# Patient Record
Sex: MALE | Race: WHITE | NOT HISPANIC OR LATINO | Employment: FULL TIME | URBAN - METROPOLITAN AREA
[De-identification: names, ages, dates, MRNs, and addresses within clinical notes are randomized per-mention and may not be internally consistent; named-entity substitution may affect disease eponyms.]

---

## 2019-01-25 ENCOUNTER — OFFICE VISIT (OUTPATIENT)
Dept: FAMILY MEDICINE CLINIC | Facility: CLINIC | Age: 39
End: 2019-01-25
Payer: COMMERCIAL

## 2019-01-25 VITALS
BODY MASS INDEX: 18.32 KG/M2 | WEIGHT: 114 LBS | SYSTOLIC BLOOD PRESSURE: 106 MMHG | DIASTOLIC BLOOD PRESSURE: 76 MMHG | HEIGHT: 66 IN | HEART RATE: 88 BPM | RESPIRATION RATE: 17 BRPM | TEMPERATURE: 97.7 F

## 2019-01-25 DIAGNOSIS — Z87.891 FORMER SMOKER: ICD-10-CM

## 2019-01-25 DIAGNOSIS — R73.01 IFG (IMPAIRED FASTING GLUCOSE): ICD-10-CM

## 2019-01-25 DIAGNOSIS — Z76.89 ENCOUNTER TO ESTABLISH CARE WITH NEW DOCTOR: Primary | ICD-10-CM

## 2019-01-25 DIAGNOSIS — Z00.00 ROUTINE ADULT HEALTH MAINTENANCE: ICD-10-CM

## 2019-01-25 PROCEDURE — 99385 PREV VISIT NEW AGE 18-39: CPT | Performed by: FAMILY MEDICINE

## 2019-01-25 NOTE — PATIENT INSTRUCTIONS

## 2019-01-25 NOTE — ASSESSMENT & PLAN NOTE
Per pt, had an abnormal FBG on labs in Jan 2018, reviewed healthy diet and will be getting repeat labs in Fall 2019, send results here

## 2019-01-25 NOTE — PROGRESS NOTES
HEALTH MAINTENANCE-NEW PATIENT  Kalyan Jeffery 41 y o  male   DATE: January 25, 2019   Assessment and Plan:  41 y o  male exam      1  Health Maintenance  - Colonoscopy? (Age 54-65)- start at age 48, not at increased risk  - Labs: gets them done at work  - Immunizations: Reviewed  Recommend yearly flu vaccine- will get it at work  Will check with insurance about [place] [facility] Maintenance   Topic Date Due    INFLUENZA VACCINE  02/25/2019 (Originally 7/1/2018)    DTaP,Tdap,and Td Vaccines (1 - Tdap) 02/25/2019 (Originally 6/18/2002)    Depression Screening PHQ  01/25/2020     2  Discussed the patient's BMI with him, (Body mass index is 18 26 kg/m²  )  Advised a healthy, balanced diet and regular exercise for 30 minutes 4-5 times a week  3  Patient Counseling: Patient given handout  --Nutrition: Stressed importance of moderation in sodium/caffeine intake, saturated fat and cholesterol, caloric balance, sufficient intake of fresh fruits, vegetables, fiber, calcium, AND iron  --Exercise: Stressed the importance of regular exercise  --Substance Abuse: Discussed cessation/primary prevention of tobacco, alcohol, or other drug use; driving or other dangerous activities under the influence; availability of treatment for abuse  --Dental health: Discussed importance of regular tooth brushing, flossing, and dental visits  4  Other diagnoses addressed today:   IFG (impaired fasting glucose)  Per pt, had an abnormal FBG on labs in Jan 2018, reviewed healthy diet and will be getting repeat labs in Fall 2019, send results here    Former smoker  Quit smoking in 2018! 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 [employer]  Labs done in Jan 2018 at work: FLP-WNL per pt  BG slightly elevated    Vaccines: TdaP maybe in 2002, no influenza    Diet: [address]    Histories Updated and Reviewed 1/25/2019:  Patient's Medications    No medications on file     Allergies   Allergen Reactions    Other Allergic Rhinitis     SEASONAL ALLERGIES AND PET DANDER     Past Medical History:   Diagnosis Date    Allergic      Social History     Social History    Marital status: /Civil Union     Spouse name: N/A    Number of children: N/A    Years of education: N/A     Occupational History    Not on file  Social History Main Topics    Smoking status: Former Smoker     Packs/day: 0 50     Years: 22 00     Quit date: 11/15/2018    Smokeless tobacco: Never Used    Alcohol use Yes      Comment: social drinker    Drug use: No    Sexual activity: Yes     Partners: Female     Birth control/ protection: Condom Male     Other Topics Concern    Not on file     Social History Narrative    No narrative on file       There is no immunization history on file for this patient  Review of Systems:  Review of Systems   Constitutional: Negative for chills and fever  HENT: Negative for hearing loss  Eyes: Negative for visual disturbance  Respiratory: Negative for shortness of breath  Cardiovascular: Negative for chest pain  Gastrointestinal: Negative for nausea and vomiting  Genitourinary: Negative for difficulty urinating  Skin: Negative for rash  Allergic/Immunologic: Negative for environmental allergies  Hematological: Does not bruise/bleed easily       PHQ-9 Depression Screening    PHQ-9:    Frequency of the following problems over the past two weeks:       Little interest or pleasure in doing things:  1 - several days  Feeling down, depressed, or hopeless:  1 - several days  Trouble falling or staying asleep, or sleeping too much:  0 - not at all  Feeling tired or having little energy:  0 - not at all  Poor appetite or overeatin - not at all  Feeling bad about yourself - or that you are a failure or have let yourself or your family down:  1 - several days  Trouble concentrating on things, such as reading the newspaper or watching television:  0 - not at all  Moving or speaking so slowly that other people could have noticed  Or the opposite - being so fidgety or restless that you have been moving around a lot more than usual:  0 - not at all  Thoughts that you would be better off dead, or of hurting yourself in some way:  0 - not at all  PHQ-2 Score:  2  PHQ-9 Score:  3       Objective:  /76 (BP Location: Left arm, Patient Position: Sitting, Cuff Size: Standard)   Pulse 88   Temp 97 7 °F (36 5 °C)   Resp 17   Ht 5' 6 25" (1 683 m)   Wt 51 7 kg (114 lb)   BMI 18 26 kg/m²   Physical Exam   Constitutional: He is oriented to person, place, and time  He appears well-developed and well-nourished  No distress  HENT:   Head: Normocephalic and atraumatic  Mouth/Throat: Oropharynx is clear and moist  No oropharyngeal exudate  Eyes: Pupils are equal, round, and reactive to light  Conjunctivae are normal  Right eye exhibits no discharge  Left eye exhibits no discharge  Neck: Normal range of motion  Neck supple  Cardiovascular: Normal rate, regular rhythm and normal heart sounds  Pulmonary/Chest: Effort normal and breath sounds normal  No respiratory distress  He has no wheezes  He has no rales  Abdominal: Soft  Bowel sounds are normal  He exhibits no distension  There is no tenderness  Lymphadenopathy:     He has no cervical adenopathy  Neurological: He is alert and oriented to person, place, and time  Skin: He is not diaphoretic  Multiple tattoos   Vitals reviewed  Patient Care Team:  Tiffanie Espinoza MD as PCP - General (Family Medicine)    Tiffanie Espinoza MD    Note: Portions of the record may have been created with voice recognition software    Occasional wrong word or "sound a like" substitutions may have occurred due to the inherent limitations of voice recognition software  Read the chart carefully and recognize, using context, where substitutions have occurred

## 2019-06-12 ENCOUNTER — OFFICE VISIT (OUTPATIENT)
Dept: URGENT CARE | Age: 39
End: 2019-06-12
Payer: COMMERCIAL

## 2019-06-12 VITALS
BODY MASS INDEX: 17.84 KG/M2 | TEMPERATURE: 98.2 F | WEIGHT: 111 LBS | RESPIRATION RATE: 16 BRPM | HEIGHT: 66 IN | HEART RATE: 88 BPM | OXYGEN SATURATION: 98 % | DIASTOLIC BLOOD PRESSURE: 72 MMHG | SYSTOLIC BLOOD PRESSURE: 108 MMHG
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
## 2019-06-12 PROCEDURE — 99213 OFFICE O/P EST LOW 20 MIN: CPT | Performed by: NURSE PRACTITIONER

## 2019-06-12 RX ORDER — SULFAMETHOXAZOLE AND TRIMETHOPRIM 800; 160 MG/1; MG/1
1 TABLET ORAL EVERY 12 HOURS SCHEDULED
Qty: 14 TABLET | Refills: 0 | Status: SHIPPED | OUTPATIENT
Start: 2019-06-12 | End: 2019-06-19

## 2019-10-25 ENCOUNTER — HOSPITAL ENCOUNTER (INPATIENT)
Facility: HOSPITAL | Age: 39
LOS: 2 days | Discharge: HOME | DRG: 639 | End: 2019-10-27
Attending: EMERGENCY MEDICINE | Admitting: INTERNAL MEDICINE
Payer: COMMERCIAL

## 2019-10-25 ENCOUNTER — OFFICE VISIT (OUTPATIENT)
Dept: FAMILY MEDICINE CLINIC | Facility: CLINIC | Age: 39
End: 2019-10-25
Payer: COMMERCIAL

## 2019-10-25 VITALS
SYSTOLIC BLOOD PRESSURE: 104 MMHG | BODY MASS INDEX: 17.92 KG/M2 | TEMPERATURE: 97.4 F | OXYGEN SATURATION: 98 % | RESPIRATION RATE: 16 BRPM | WEIGHT: 111 LBS | HEART RATE: 104 BPM | DIASTOLIC BLOOD PRESSURE: 70 MMHG

## 2019-10-25 DIAGNOSIS — E86.0 DEHYDRATION: ICD-10-CM

## 2019-10-25 DIAGNOSIS — E11.9 DIABETES MELLITUS (HCC): ICD-10-CM

## 2019-10-25 DIAGNOSIS — R73.9 HIGH BLOOD SUGAR: Primary | ICD-10-CM

## 2019-10-25 DIAGNOSIS — E13.65 UNCONTROLLED OTHER SPECIFIED DIABETES MELLITUS WITH HYPERGLYCEMIA (HCC): Primary | ICD-10-CM

## 2019-10-25 DIAGNOSIS — E11.9 DIABETES MELLITUS, NEW ONSET (HCC): ICD-10-CM

## 2019-10-25 LAB
ALBUMIN SERPL BCP-MCNC: 3.5 G/DL (ref 3.5–5)
ALP SERPL-CCNC: 73 U/L (ref 46–116)
ALT SERPL W P-5'-P-CCNC: 22 U/L (ref 12–78)
ANION GAP BLD CALC-SCNC: 16 MMOL/L (ref 4–13)
ANION GAP SERPL CALCULATED.3IONS-SCNC: 6 MMOL/L (ref 4–13)
AST SERPL W P-5'-P-CCNC: 11 U/L (ref 5–45)
BASE EXCESS BLDA CALC-SCNC: 2 MMOL/L (ref -2–3)
BASOPHILS # BLD AUTO: 0.02 THOUSANDS/ΜL (ref 0–0.1)
BASOPHILS NFR BLD AUTO: 0 % (ref 0–1)
BETA-HYDROXYBUTYRATE: 1.1 MMOL/L
BILIRUB SERPL-MCNC: 0.5 MG/DL (ref 0.2–1)
BUN BLD-MCNC: 9 MG/DL (ref 5–25)
BUN SERPL-MCNC: 11 MG/DL (ref 5–25)
CA-I BLD-SCNC: 1.23 MMOL/L (ref 1.12–1.32)
CA-I BLD-SCNC: 1.24 MMOL/L (ref 1.12–1.32)
CALCIUM SERPL-MCNC: 9.2 MG/DL (ref 8.3–10.1)
CHLORIDE BLD-SCNC: 94 MMOL/L (ref 100–108)
CHLORIDE SERPL-SCNC: 96 MMOL/L (ref 100–108)
CO2 SERPL-SCNC: 29 MMOL/L (ref 21–32)
CREAT BLD-MCNC: 0.3 MG/DL (ref 0.6–1.3)
CREAT SERPL-MCNC: 0.63 MG/DL (ref 0.6–1.3)
EOSINOPHIL # BLD AUTO: 0.05 THOUSAND/ΜL (ref 0–0.61)
EOSINOPHIL NFR BLD AUTO: 1 % (ref 0–6)
ERYTHROCYTE [DISTWIDTH] IN BLOOD BY AUTOMATED COUNT: 11.8 % (ref 11.6–15.1)
EST. AVERAGE GLUCOSE BLD GHB EST-MCNC: 378 MG/DL
GFR SERPL CREATININE-BSD FRML MDRD: 125 ML/MIN/1.73SQ M
GFR SERPL CREATININE-BSD FRML MDRD: 170 ML/MIN/1.73SQ M
GLUCOSE SERPL-MCNC: 116 MG/DL (ref 65–140)
GLUCOSE SERPL-MCNC: 240 MG/DL (ref 65–140)
GLUCOSE SERPL-MCNC: 444 MG/DL (ref 65–140)
GLUCOSE SERPL-MCNC: 686 MG/DL (ref 65–140)
GLUCOSE SERPL-MCNC: >600 MG/DL (ref 65–140)
GLUCOSE SERPL-MCNC: >600 MG/DL (ref 65–140)
HBA1C MFR BLD: 14.8 % (ref 4.2–6.3)
HCO3 BLDA-SCNC: 27.7 MMOL/L (ref 24–30)
HCT VFR BLD AUTO: 39.3 % (ref 36.5–49.3)
HCT VFR BLD CALC: 40 % (ref 36.5–49.3)
HCT VFR BLD CALC: 40 % (ref 36.5–49.3)
HGB BLD-MCNC: 13.6 G/DL (ref 12–17)
HGB BLDA-MCNC: 13.6 G/DL (ref 12–17)
HGB BLDA-MCNC: 13.6 G/DL (ref 12–17)
IMM GRANULOCYTES # BLD AUTO: 0.03 THOUSAND/UL (ref 0–0.2)
IMM GRANULOCYTES NFR BLD AUTO: 1 % (ref 0–2)
LYMPHOCYTES # BLD AUTO: 1.33 THOUSANDS/ΜL (ref 0.6–4.47)
LYMPHOCYTES NFR BLD AUTO: 27 % (ref 14–44)
MCH RBC QN AUTO: 30.8 PG (ref 26.8–34.3)
MCHC RBC AUTO-ENTMCNC: 34.6 G/DL (ref 31.4–37.4)
MCV RBC AUTO: 89 FL (ref 82–98)
MONOCYTES # BLD AUTO: 0.54 THOUSAND/ΜL (ref 0.17–1.22)
MONOCYTES NFR BLD AUTO: 11 % (ref 4–12)
NEUTROPHILS # BLD AUTO: 3 THOUSANDS/ΜL (ref 1.85–7.62)
NEUTS SEG NFR BLD AUTO: 60 % (ref 43–75)
NRBC BLD AUTO-RTO: 0 /100 WBCS
PCO2 BLD: 28 MMOL/L (ref 21–32)
PCO2 BLD: 29 MMOL/L (ref 21–32)
PCO2 BLD: 47.7 MM HG (ref 42–50)
PH BLD: 7.37 [PH] (ref 7.3–7.4)
PLATELET # BLD AUTO: 267 THOUSANDS/UL (ref 149–390)
PMV BLD AUTO: 10.3 FL (ref 8.9–12.7)
PO2 BLD: 39 MM HG (ref 35–45)
POTASSIUM BLD-SCNC: 4.3 MMOL/L (ref 3.5–5.3)
POTASSIUM BLD-SCNC: 4.3 MMOL/L (ref 3.5–5.3)
POTASSIUM SERPL-SCNC: 4.3 MMOL/L (ref 3.5–5.3)
PROT SERPL-MCNC: 7 G/DL (ref 6.4–8.2)
RBC # BLD AUTO: 4.42 MILLION/UL (ref 3.88–5.62)
SAO2 % BLD FROM PO2: 71 % (ref 60–85)
SL AMB POCT HEMOGLOBIN AIC: 14 (ref ?–6.5)
SL AMB POCT HGB: 600
SODIUM BLD-SCNC: 133 MMOL/L (ref 136–145)
SODIUM BLD-SCNC: 133 MMOL/L (ref 136–145)
SODIUM SERPL-SCNC: 131 MMOL/L (ref 136–145)
SPECIMEN SOURCE: ABNORMAL
SPECIMEN SOURCE: ABNORMAL
TSH SERPL DL<=0.05 MIU/L-ACNC: 1.98 UIU/ML (ref 0.36–3.74)
WBC # BLD AUTO: 4.97 THOUSAND/UL (ref 4.31–10.16)

## 2019-10-25 PROCEDURE — 82948 REAGENT STRIP/BLOOD GLUCOSE: CPT

## 2019-10-25 PROCEDURE — 82330 ASSAY OF CALCIUM: CPT

## 2019-10-25 PROCEDURE — 36415 COLL VENOUS BLD VENIPUNCTURE: CPT | Performed by: EMERGENCY MEDICINE

## 2019-10-25 PROCEDURE — 96374 THER/PROPH/DIAG INJ IV PUSH: CPT

## 2019-10-25 PROCEDURE — 99291 CRITICAL CARE FIRST HOUR: CPT | Performed by: EMERGENCY MEDICINE

## 2019-10-25 PROCEDURE — 84443 ASSAY THYROID STIM HORMONE: CPT | Performed by: EMERGENCY MEDICINE

## 2019-10-25 PROCEDURE — 85014 HEMATOCRIT: CPT

## 2019-10-25 PROCEDURE — 82803 BLOOD GASES ANY COMBINATION: CPT

## 2019-10-25 PROCEDURE — 84295 ASSAY OF SERUM SODIUM: CPT

## 2019-10-25 PROCEDURE — 94760 N-INVAS EAR/PLS OXIMETRY 1: CPT

## 2019-10-25 PROCEDURE — 94664 DEMO&/EVAL PT USE INHALER: CPT

## 2019-10-25 PROCEDURE — 85018 HEMOGLOBIN: CPT | Performed by: FAMILY MEDICINE

## 2019-10-25 PROCEDURE — 93005 ELECTROCARDIOGRAM TRACING: CPT

## 2019-10-25 PROCEDURE — 83036 HEMOGLOBIN GLYCOSYLATED A1C: CPT | Performed by: EMERGENCY MEDICINE

## 2019-10-25 PROCEDURE — 99214 OFFICE O/P EST MOD 30 MIN: CPT | Performed by: FAMILY MEDICINE

## 2019-10-25 PROCEDURE — 99223 1ST HOSP IP/OBS HIGH 75: CPT | Performed by: INTERNAL MEDICINE

## 2019-10-25 PROCEDURE — 83036 HEMOGLOBIN GLYCOSYLATED A1C: CPT | Performed by: FAMILY MEDICINE

## 2019-10-25 PROCEDURE — 80047 BASIC METABLC PNL IONIZED CA: CPT

## 2019-10-25 PROCEDURE — 80053 COMPREHEN METABOLIC PANEL: CPT | Performed by: EMERGENCY MEDICINE

## 2019-10-25 PROCEDURE — 82010 KETONE BODYS QUAN: CPT | Performed by: EMERGENCY MEDICINE

## 2019-10-25 PROCEDURE — 84132 ASSAY OF SERUM POTASSIUM: CPT

## 2019-10-25 PROCEDURE — 99285 EMERGENCY DEPT VISIT HI MDM: CPT

## 2019-10-25 PROCEDURE — 82947 ASSAY GLUCOSE BLOOD QUANT: CPT

## 2019-10-25 PROCEDURE — 85025 COMPLETE CBC W/AUTO DIFF WBC: CPT | Performed by: EMERGENCY MEDICINE

## 2019-10-25 RX ORDER — SODIUM CHLORIDE 9 MG/ML
125 INJECTION, SOLUTION INTRAVENOUS CONTINUOUS
Status: DISCONTINUED | OUTPATIENT
Start: 2019-10-25 | End: 2019-10-26

## 2019-10-25 RX ADMIN — SODIUM CHLORIDE 10 UNITS/HR: 9 INJECTION, SOLUTION INTRAVENOUS at 16:46

## 2019-10-25 RX ADMIN — SODIUM CHLORIDE 1000 ML: 0.9 INJECTION, SOLUTION INTRAVENOUS at 17:05

## 2019-10-25 RX ADMIN — SODIUM CHLORIDE 125 ML/HR: 0.9 INJECTION, SOLUTION INTRAVENOUS at 20:14

## 2019-10-25 RX ADMIN — SODIUM CHLORIDE 1000 ML: 0.9 INJECTION, SOLUTION INTRAVENOUS at 16:19

## 2019-10-25 NOTE — ED PROVIDER NOTES
History  Chief Complaint   Patient presents with    Hyperglycemia - no symptoms     pt states at work had blood work checked glucose was high, was seen by PCP today did more blood work glucose still high was sent to ER       History provided by:  Patient  Hyperglycemia - Symptomatic   Blood sugar level PTA:  >500  Severity:  Severe  Onset quality:  Unable to specify  Duration: unclear  Timing:  Unable to specify  Progression:  Unable to specify  Chronicity:  New  Diabetes status:  Non-diabetic  Current diabetic therapy:  None  Time since last antidiabetic medication: xavier  Context comment:  Patient had random blood work through his job, was found have a blood sugar fasting of greater than 300 went to his PCP had a nonfasting blood sugar in the office which was greater than 500 and a point of care hemoglobin A1c which was greater than 14,  Relieved by:  None tried  Ineffective treatments:  None tried  Associated symptoms: increased thirst and polyuria    Associated symptoms: no abdominal pain, no altered mental status, no blurred vision, no chest pain, no diaphoresis, no dizziness, no dysuria, no fatigue, no fever, no nausea, no shortness of breath and no vomiting        None       Past Medical History:   Diagnosis Date    Allergic        History reviewed  No pertinent surgical history  Family History   Problem Relation Age of Onset    Hypertension Mother     Diabetes Mother     Diabetes Father     Dementia Father     Substance Abuse Brother      I have reviewed and agree with the history as documented      Social History     Tobacco Use    Smoking status: Former Smoker     Packs/day: 0 50     Years: 22 00     Pack years: 11 00     Last attempt to quit: 11/15/2018     Years since quittin 9    Smokeless tobacco: Never Used   Substance Use Topics    Alcohol use: Yes     Binge frequency: Monthly     Comment: social drinker    Drug use: No        Review of Systems   Constitutional: Negative for activity change, chills, diaphoresis, fatigue and fever  HENT: Negative for congestion, sinus pressure and sore throat  Eyes: Negative for blurred vision, pain and visual disturbance  Respiratory: Negative for cough, chest tightness, shortness of breath, wheezing and stridor  Cardiovascular: Negative for chest pain and palpitations  Gastrointestinal: Negative for abdominal distention, abdominal pain, constipation, diarrhea, nausea and vomiting  Endocrine: Positive for polydipsia and polyuria  Genitourinary: Negative for dysuria and frequency  Musculoskeletal: Negative for neck pain and neck stiffness  Skin: Negative for rash  Neurological: Negative for dizziness, speech difficulty, light-headedness, numbness and headaches  Physical Exam  Physical Exam   Constitutional: He is oriented to person, place, and time  He appears well-developed  HENT:   Head: Normocephalic and atraumatic  Dry mucous membranes   Eyes: Pupils are equal, round, and reactive to light  Neck: Normal range of motion  Neck supple  No tracheal deviation present  Cardiovascular: Regular rhythm, normal heart sounds and intact distal pulses  No murmur heard  Tachycardic, heart rate around 110, regular   Pulmonary/Chest: Effort normal and breath sounds normal  No stridor  No respiratory distress  Abdominal: Soft  He exhibits no distension  There is no tenderness  There is no rebound and no guarding  Nontender to deep palpation all 4 quadrants   Musculoskeletal: Normal range of motion  Neurological: He is alert and oriented to person, place, and time  Skin: Skin is warm and dry  He is not diaphoretic  No erythema  No pallor  Psychiatric: He has a normal mood and affect  Vitals reviewed        Vital Signs  ED Triage Vitals   Temperature Pulse Respirations Blood Pressure SpO2   10/25/19 1546 10/25/19 1546 10/25/19 1546 10/25/19 1546 10/25/19 1546   98 °F (36 7 °C) (!) 107 16 128/71 100 %      Temp Source Heart Rate Source Patient Position - Orthostatic VS BP Location FiO2 (%)   10/25/19 1546 10/25/19 1645 10/25/19 1645 10/25/19 1645 --   Oral Monitor Lying Left arm       Pain Score       --                  Vitals:    10/25/19 1546 10/25/19 1645   BP: 128/71 124/83   Pulse: (!) 107 91   Patient Position - Orthostatic VS:  Lying         Visual Acuity      ED Medications  Medications   sodium chloride 0 9 % bolus 1,000 mL (1,000 mL Intravenous New Bag 10/25/19 1619)   sodium chloride 0 9 % bolus 1,000 mL (has no administration in time range)   sodium chloride 0 9 % bolus 1,000 mL (has no administration in time range)   insulin regular (HumuLIN R,NovoLIN R) 1 Units/mL in sodium chloride 0 9 % 100 mL infusion (10 Units/hr Intravenous New Bag 10/25/19 1646)       Diagnostic Studies  Results Reviewed     Procedure Component Value Units Date/Time    POCT Chem 8+ [[id number]]  (Abnormal) Collected:  10/25/19 1629    Lab Status:  Final result Specimen:  Venous Updated:  10/25/19 1636     SODIUM, I-STAT 133 mmol/l      Potassium, i-STAT 4 3 mmol/L      Chloride, istat 94 mmol/L      CO2, i-STAT 28 mmol/L      Anion Gap, i-STAT 16 mmol/L      Calcium, Ionized i-STAT 1 24 mmol/L      BUN, I-STAT 9 mg/dl      Creatinine, i-STAT 0 3 mg/dl      eGFR 170 ml/min/1 73sq m      Glucose, i-STAT >600 mg/dl      Hct, i-STAT 40 %      Hgb, i-STAT 13 6 g/dl      Specimen Type VENOUS    Narrative:       National Kidney Disease Foundation guidelines for Chronic Kidney Disease (CKD):     Stage 1 with normal or high GFR (GFR > 90 mL/min/1 73 square meters)    Stage 2 Mild CKD (GFR = 60-89 mL/min/1 73 square meters)    Stage 3A Moderate CKD (GFR = 45-59 mL/min/1 73 square meters)    Stage 3B Moderate CKD (GFR = 30-44 mL/min/1 73 square meters)    Stage 4 Severe CKD (GFR = 15-29 mL/min/1 73 square meters)    Stage 5 End Stage CKD (GFR <15 mL/min/1 73 square meters)  Note: GFR calculation is accurate only with a steady state creatinine    POCT Blood Gas (CG8+) [[id number]]  (Abnormal) Collected:  10/25/19 1629    Lab Status:  Final result Specimen:  Venous Updated:  10/25/19 1635     ph, Johnnie ISTAT 7 371     pCO2, Johnnie i-STAT 47 7 mm HG      pO2, Johnnie i-STAT 39 0 mm HG      BE, i-STAT 2 mmol/L      HCO3, Johnnie i-STAT 27 7 mmol/L      CO2, i-STAT 29 mmol/L      O2 Sat, i-STAT 71 %      SODIUM, I-STAT 133 mmol/l      Potassium, i-STAT 4 3 mmol/L      Calcium, Ionized i-STAT 1 23 mmol/L      Hct, i-STAT 40 %      Hgb, i-STAT 13 6 g/dl      Glucose, i-STAT >600 mg/dl      Specimen Type VENOUS    CBC and differential [[id number]] Collected:  10/25/19 1618    Lab Status:  Final result Specimen:  Blood from Arm, Right Updated:  10/25/19 1634     WBC 4 97 Thousand/uL      RBC 4 42 Million/uL      Hemoglobin 13 6 g/dL      Hematocrit 39 3 %      MCV 89 fL      MCH 30 8 pg      MCHC 34 6 g/dL      RDW 11 8 %      MPV 10 3 fL      Platelets 438 Thousands/uL      nRBC 0 /100 WBCs      Neutrophils Relative 60 %      Immat GRANS % 1 %      Lymphocytes Relative 27 %      Monocytes Relative 11 %      Eosinophils Relative 1 %      Basophils Relative 0 %      Neutrophils Absolute 3 00 Thousands/µL      Immature Grans Absolute 0 03 Thousand/uL      Lymphocytes Absolute 1 33 Thousands/µL      Monocytes Absolute 0 54 Thousand/µL      Eosinophils Absolute 0 05 Thousand/µL      Basophils Absolute 0 02 Thousands/µL     Beta Hydroxybutyrate [[id number]]  (Abnormal) Collected:  10/25/19 1618    Lab Status:  Final result Specimen:  Blood from Arm, Right Updated:  10/25/19 1633     BETA-HYDROXYBUTYRATE 1 1 mmol/L     Hemoglobin A1C [[id number]] Collected:  10/25/19 1618    Lab Status: In process Specimen:  Blood from Arm, Right Updated:  10/25/19 1629    TSH [[id number]] Collected:  10/25/19 1618    Lab Status: In process Specimen:  Blood from Arm, Right Updated:  10/25/19 1629    Comprehensive metabolic panel [[id number]] Collected:  10/25/19 1618    Lab Status:   In process Specimen:  Blood from Arm, Right Updated:  10/25/19 1629                 No orders to display              Procedures  ECG 12 Lead Documentation Only  Date/Time: 10/25/2019 4:47 PM  Performed by: Gertrudis Field DO  Authorized by: Gertrudis Field DO     ECG reviewed by me, the ED Provider: yes    Patient location:  ED  Interpretation:     Interpretation: normal    Rate:     ECG rate:  96    ECG rate assessment: normal    Rhythm:     Rhythm: sinus rhythm    Ectopy:     Ectopy: none    QRS:     QRS axis:  Normal    QRS intervals:  Normal  Conduction:     Conduction: normal    ST segments:     ST segments:  Normal  T waves:     T waves: normal      CriticalCare Time  Performed by: Gertrudis Field DO  Authorized by: Gertrudis Field DO     Critical care provider statement:     Critical care time (minutes):  35    Critical care time was exclusive of:  Separately billable procedures and treating other patients    Critical care was necessary to treat or prevent imminent or life-threatening deterioration of the following conditions:  Endocrine crisis    Critical care was time spent personally by me on the following activities:  Obtaining history from patient or surrogate, development of treatment plan with patient or surrogate, discussions with consultants, evaluation of patient's response to treatment, examination of patient, ordering and performing treatments and interventions, ordering and review of laboratory studies, ordering and review of radiographic studies, re-evaluation of patient's condition and review of old charts           ED Course  ED Course as of Oct 25 1656   Fri Oct 25, 2019   1654 Hemoglobin A1C      multiple repeat evaluations, patient reports continued improvement with IV fluids                            MDM  Number of Diagnoses or Management Options  Dehydration: new and requires workup  Uncontrolled other specified diabetes mellitus with hyperglycemia [facility]): new and requires workup  Diagnosis management comments: Initial ED assessment:  36-year-old male presents with a blood sugar greater than 500 and hemoglobin A1c greater than 14 sent in by PCP  This was discovered on outpatient routine blood work through his employer  Patient has noticed polyuria polydipsia, tachycardic on examination and clinically dehydrated    Initial DDx includes but is not limited to:   New onset diabetes, patient lives a very active lifestyle and has a typically healthy diet, I think this is more likely a late onset type 1 diabetes, less likely type 2    Initial ED plan:   Blood work, IV fluids, insulin drip, VBG, admission to calculate insulin requirements, diabetes education, and further workup        Final ED summary/disposition:   After evaluation and workup in the emergency department, improvement with IV fluids, currently on insulin drip, admitted to hospital for further workup and evaluation            Amount and/or Complexity of Data Reviewed  Clinical lab tests: ordered and reviewed  Decide to obtain previous medical records or to obtain history from someone other than the patient: yes  Review and summarize past medical records: yes  Discuss the patient with other providers: yes  Independent visualization of images, tracings, or specimens: yes        Disposition  Final diagnoses:   Uncontrolled other specified diabetes mellitus with hyperglycemia ([facility] [address] )   Dehydration     Time reflects when diagnosis was documented in both MDM as applicable and the Disposition within this note     Time User Action Codes Description Comment    10/25/2019  4:55 PM Carmelita Davis [[id number]] Uncontrolled other specified diabetes mellitus with hyperglycemia ([facility] [address] )     10/25/2019  4:55 PM [facility] Robert [[id number]] Dehydration       ED Disposition     ED Disposition Condition Date/Time Comment    Admit Stable Fri Oct 25, 2019  4:55 PM Case was discussed with Dr Mindy Dailey  and the patient's admission status was agreed to be Admission Status: inpatient status to the service of Dr Mindy Dailey   Follow-up Information    None         Patient's Medications    No medications on file     No discharge procedures on file      ED Provider  Electronically Signed by           Jonn Barrera DO  10/25/19 7426

## 2019-10-25 NOTE — H&P
H&P- Donell Wells 1980, 46 y o  male MRN: [id number]    Unit/Bed#: -01 Encounter: [id number]    Primary Care Provider: Edelmira Darling MD   Date and time admitted to hospital: 10/25/2019  3:44 PM    * Diabetes mellitus, new diagnosis [facility])  Assessment & Plan  Lab Results   Component Value Date    HGBA1C 14 (A) 10/25/2019     Recent Labs     10/25/19  1818   POCGLU 444*     Blood Sugar Average: Last 72 hrs:  · (P) 444   · Presented from with hyperglycemia after routine lab studies done at his job  · In the ED blood glucose >600 and he was started insulin infusion  · Patient is very athletic and vegetarian, lives a healthy and active lifestyle  · He will be admitted to med surg, continue insulin infusion with plans to transition to basal/bolus insulin  · Check hemoglobin A1c, endocrinology eval, will need further immune studies for late onset type 1 diabetes  · Check anti ziyad autoantibodies  · Diabetic education    VTE Prophylaxis: Low risk, ambulate      Code Status:  Full code    Anticipated Length of Stay:  Patient will be admitted on an Inpatient basis with an anticipated length of stay of  > 2 midnights  Justification for Hospital Stay:  Hyperglycemia, new diagnosis diabetes    Chief Complaint:     Hyperglycemia    History of Present Illness:  Donell Wells is a 46 y o  male who presents from his primary care physician's office due to elevated blood glucose levels  Patient reports that on recent routine blood work done at his job, he was noted with elevated blood glucose  He saw his primary care physician today and point of care glucose as well as hemoglobin A1c was significantly elevated  In the ED his glucose was greater than 600  Patient reports in hind site over 2 months history of polyuria, polydipsia  He is very athletic and also a vegetarian  He has noted some weight loss but attributed this to his healthy lifestyle    He denies any fever or chills, no chest pain or shortness of breath, no recent stresses  He denies any vision changes  Review of Systems   Constitutional: Negative  HENT: Negative  Respiratory: Negative  Cardiovascular: Negative  Gastrointestinal: Negative  Endocrine: Positive for polydipsia and polyuria  Genitourinary: Positive for frequency  Musculoskeletal: Negative  Neurological: Negative  Psychiatric/Behavioral: Negative  All other systems reviewed and are negative  Past Medical History:   Diagnosis Date    Allergic     Diabetes mellitus ([facility] [address] ) 10/25/2019       History reviewed  No pertinent surgical history  Family History:  Family History   Problem Relation Age of Onset    Hypertension Mother     Diabetes Mother     Diabetes Father     Dementia Father     Substance Abuse Brother        Home Meds:  None    Allergies: Allergies   Allergen Reactions    Other Allergic Rhinitis     SEASONAL ALLERGIES AND PET DANDER     Marital Status: /Civil Union     Social History     Substance and Sexual Activity   Alcohol Use Yes    Binge frequency: Monthly    Comment: social drinker     Social History     Tobacco Use   Smoking Status Former Smoker    Packs/day: 0 50    Years: 22 00    Pack years: 11 00    Last attempt to quit: 11/15/2018    Years since quittin 9   Smokeless Tobacco Never Used     Social History     Substance and Sexual Activity   Drug Use No     Physical Exam:   Vitals:   Blood Pressure: 124/83 (10/25/19 1645)  Pulse: 91 (10/25/19 1645)  Temperature: 98 °F (36 7 °C) (10/25/19 1546)  Temp Source: Oral (10/25/19 1546)  Respirations: 18 (10/25/19 1645)  Height: 5' 6" (167 6 cm) (10/25/19 1546)  SpO2: 100 % (10/25/19 1645)    Physical Exam   Constitutional: He is oriented to person, place, and time  He appears well-developed and well-nourished  No distress  HENT:   Head: Normocephalic and atraumatic  Eyes: EOM are normal  Right eye exhibits no discharge  Left eye exhibits no discharge   No scleral icterus  Neck: Normal range of motion  Neck supple  Cardiovascular: Normal rate and regular rhythm  No murmur heard  Pulmonary/Chest: Effort normal and breath sounds normal  No respiratory distress  He has no wheezes  Abdominal: Soft  Bowel sounds are normal  He exhibits no distension  There is no tenderness  Musculoskeletal: Normal range of motion  He exhibits no edema  Neurological: He is alert and oriented to person, place, and time  Skin: Skin is warm and dry  He is not diaphoretic  Psychiatric: He has a normal mood and affect  His behavior is normal    Vitals reviewed  Lab Results: I have personally reviewed pertinent reports  Results from last 7 days   Lab Units 10/25/19  1629 10/25/19  1618   WBC Thousand/uL  --  4 97   HEMOGLOBIN g/dL  --  13 6   I STAT HEMOGLOBIN g/dl 13 6  13 6  --    HEMATOCRIT %  --  39 3   HEMATOCRIT, ISTAT % 40  40  --    PLATELETS Thousands/uL  --  267   NEUTROS PCT %  --  60   LYMPHS PCT %  --  27   MONOS PCT %  --  11   EOS PCT %  --  1     Results from last 7 days   Lab Units 10/25/19  1629 10/25/19  1618   POTASSIUM mmol/L  --  4 3   CHLORIDE mmol/L  --  96*   CO2 mmol/L  --  29   CO2, I-STAT mmol/L 28  29  --    BUN mg/dL  --  11   CREATININE mg/dL  --  0 63   CALCIUM mg/dL  --  9 2   ALK PHOS U/L  --  73   ALT U/L  --  22   AST U/L  --  11   GLUCOSE, ISTAT mg/dl >600*  >600*  --            ** Please Note: Dragon 360 Dictation voice to text software may have been used in the creation of this document   **

## 2019-10-25 NOTE — PROGRESS NOTES
FAMILY MEDICINE PROGRESS NOTE  Lurdes Matta 46 y o  male   DATE: October 25, 2019     ASSESSMENT and PLAN:  Lurdes Matta is a 46 y o  male with:     Problem List Items Addressed This Visit     None      Visit Diagnoses     High blood sugar    -  Primary    Relevant Orders    POCT hemoglobin A1c (Completed)    POCT hemoglobin fingerstick (Completed)    Transfer to other facility    Diabetes mellitus, new onset ([facility] [address] )        Relevant Orders    Transfer to other facility       Initial fasting blood glucose on work screening was 352, random fingerstick blood glucose in office today registered as greater than 600 and could not be read  Point of care hemoglobin A1c reported an error, stating that his A1c was greater than 14 0  Given this is new onset either type 1 or type 2 diabetes, advised patient to go to ER for further evaluation  Likely will need IV fluids, insulin and close monitoring of his glucose  May be in DKA  May also need workup for type 1 diabetes an inpatient endocrinology consult  Advised patient that he should be transported by ambulance, patient declined, states that he will go to the ER immediately  Patient agreeable with the plan and expressed understanding  I discucssed signs and symptoms for which to RTC, go to ER or seek urgent medical care  SUBJECTIVE:  Lurdes Matta is a 46 y o  male who presents today with a chief complaint of Results  Patient is here to discuss blood work that he had done as screening at his work  His total cholesterol was 179, HDL 61 LDL 98, triglycerides 100  His glucose level was 352  Patient was concerned that his glucose level is so high  On questioning patient states that he does feel fatigued, has polydipsia and polyuria  States he cannot sleep through the night because he is urinating so much at night time  Denies chest pain, shortness of breath, nausea, vomiting  Denies any previous history of diabetes        Review of Systems   Unable to perform ROS: Acuity of condition     I have reviewed the patient's Past Medical History  OBJECTIVE:  /70   Pulse 104   Temp (!) 97 4 °F (36 3 °C)   Resp 16   Wt 50 3 kg (111 lb)   SpO2 98%   BMI 17 92 kg/m²  physical exam deferred due to acuity of condition needing  transfer to hospital   Physical Exam   Vitals reviewed  BMI Counseling: Body mass index is 17 92 kg/m²  The BMI is below normal  No BMI follow-up plan is appropriate  Patient is in an urgent or emergent medical situation  I have spent 25 minutes with Patient  today in which greater than 50% of this time was spent in counseling/coordination of care regarding Risks and benefits of tx options, Intructions for management, Patient and family education, Importance of treatment compliance and Impressions  Valentina Faust MD    Note: Portions of the record have been created with voice recognition software  Occasional wrong word or "sound a like" substitutions may have occurred due to the inherent limitations of voice recognition software  Read the chart carefully and recognize, using context, where substitutions have occurred

## 2019-10-25 NOTE — ASSESSMENT & PLAN NOTE
Lab Results   Component Value Date    HGBA1C 14 (A) 10/25/2019       Recent Labs     10/25/19  1818   POCGLU 444*       Blood Sugar Average: Last 72 hrs:  · (P) 444   · Presented from with hyperglycemia after routine lab studies done at his job  · In the ED blood glucose >600 and he was started insulin infusion  · Patient is very athletic and vegetarian, lives a healthy and active lifestyle  · No family history of diabetes or autoimmune disorders  · He will be admitted to [facility], continue insulin infusion with plans to transition to basal/bolus insulin  · Check hemoglobin A1c, endocrinology eval, will need further immune studies for late onset type 1 diabetes  · Check anti ziyad autoantibodies

## 2019-10-26 LAB
ANION GAP SERPL CALCULATED.3IONS-SCNC: 8 MMOL/L (ref 4–13)
ATRIAL RATE: 100 BPM
BUN SERPL-MCNC: 7 MG/DL (ref 5–25)
CALCIUM SERPL-MCNC: 8.2 MG/DL (ref 8.3–10.1)
CHLORIDE SERPL-SCNC: 107 MMOL/L (ref 100–108)
CO2 SERPL-SCNC: 26 MMOL/L (ref 21–32)
CREAT SERPL-MCNC: 0.4 MG/DL (ref 0.6–1.3)
GFR SERPL CREATININE-BSD FRML MDRD: 151 ML/MIN/1.73SQ M
GLUCOSE SERPL-MCNC: 113 MG/DL (ref 65–140)
GLUCOSE SERPL-MCNC: 120 MG/DL (ref 65–140)
GLUCOSE SERPL-MCNC: 136 MG/DL (ref 65–140)
GLUCOSE SERPL-MCNC: 145 MG/DL (ref 65–140)
GLUCOSE SERPL-MCNC: 148 MG/DL (ref 65–140)
GLUCOSE SERPL-MCNC: 148 MG/DL (ref 65–140)
GLUCOSE SERPL-MCNC: 275 MG/DL (ref 65–140)
GLUCOSE SERPL-MCNC: 276 MG/DL (ref 65–140)
GLUCOSE SERPL-MCNC: 473 MG/DL (ref 65–140)
GLUCOSE SERPL-MCNC: 96 MG/DL (ref 65–140)
P AXIS: 78 DEGREES
POTASSIUM SERPL-SCNC: 3.1 MMOL/L (ref 3.5–5.3)
PR INTERVAL: 148 MS
QRS AXIS: -47 DEGREES
QRSD INTERVAL: 86 MS
QT INTERVAL: 320 MS
QTC INTERVAL: 405 MS
SODIUM SERPL-SCNC: 141 MMOL/L (ref 136–145)
T WAVE AXIS: 74 DEGREES
TSH SERPL DL<=0.05 MIU/L-ACNC: 1.22 UIU/ML (ref 0.36–3.74)
VENTRICULAR RATE: 96 BPM

## 2019-10-26 PROCEDURE — 80048 BASIC METABOLIC PNL TOTAL CA: CPT | Performed by: INTERNAL MEDICINE

## 2019-10-26 PROCEDURE — 99232 SBSQ HOSP IP/OBS MODERATE 35: CPT | Performed by: INTERNAL MEDICINE

## 2019-10-26 PROCEDURE — 99254 IP/OBS CNSLTJ NEW/EST MOD 60: CPT | Performed by: INTERNAL MEDICINE

## 2019-10-26 PROCEDURE — 84443 ASSAY THYROID STIM HORMONE: CPT | Performed by: INTERNAL MEDICINE

## 2019-10-26 PROCEDURE — 82948 REAGENT STRIP/BLOOD GLUCOSE: CPT

## 2019-10-26 PROCEDURE — 93010 ELECTROCARDIOGRAM REPORT: CPT | Performed by: INTERNAL MEDICINE

## 2019-10-26 RX ORDER — INSULIN GLARGINE 100 [IU]/ML
12 INJECTION, SOLUTION SUBCUTANEOUS
Status: DISCONTINUED | OUTPATIENT
Start: 2019-10-26 | End: 2019-10-26

## 2019-10-26 RX ORDER — INSULIN GLARGINE 100 [IU]/ML
18 INJECTION, SOLUTION SUBCUTANEOUS
Status: DISCONTINUED | OUTPATIENT
Start: 2019-10-27 | End: 2019-10-27 | Stop reason: HOSPADM

## 2019-10-26 RX ADMIN — INSULIN GLARGINE 12 UNITS: 100 INJECTION, SOLUTION SUBCUTANEOUS at 09:52

## 2019-10-26 RX ADMIN — INSULIN LISPRO 3 UNITS: 100 INJECTION, SOLUTION INTRAVENOUS; SUBCUTANEOUS at 12:35

## 2019-10-26 RX ADMIN — SODIUM CHLORIDE 125 ML/HR: 0.9 INJECTION, SOLUTION INTRAVENOUS at 04:11

## 2019-10-26 RX ADMIN — INSULIN LISPRO 2 UNITS: 100 INJECTION, SOLUTION INTRAVENOUS; SUBCUTANEOUS at 21:55

## 2019-10-26 RX ADMIN — INSULIN LISPRO 5 UNITS: 100 INJECTION, SOLUTION INTRAVENOUS; SUBCUTANEOUS at 16:26

## 2019-10-26 RX ADMIN — INSULIN LISPRO 4 UNITS: 100 INJECTION, SOLUTION INTRAVENOUS; SUBCUTANEOUS at 12:35

## 2019-10-26 NOTE — ASSESSMENT & PLAN NOTE
Lab Results   Component Value Date    HGBA1C 14 8 (H) 10/25/2019       Recent Labs     10/26/19  0614 10/26/19  0748 10/26/19  1002 10/26/19  1121   POCGLU 148* 148* 120 275*       Blood Sugar Average: Last 72 hrs:  · (P) 183 6   · Presented from with hyperglycemia after routine lab studies done at his job  · In the ED blood glucose >600 and he was started insulin infusion  · Patient is very athletic and a vegetarian, lives a healthy and active lifestyle  · Positive family history of diabetes  · Transitioned to basal/bolus insulin today  Continue diabetic education  · Suspected type 1 diabetes    Follow up on insulin antibody studies  · Anticipate patient should be stable for discharge tomorrow if blood glucose improved

## 2019-10-26 NOTE — RESPIRATORY THERAPY NOTE
RT Protocol Note  Myesha Gum 46 y o  male MRN: [id number]  Unit/Bed#: -01 Encounter: [id number]    Assessment    Principal Problem:    Diabetes mellitus, new diagnosis ([place] [address] )      Home Pulmonary Medications:  NONE       Past Medical History:   Diagnosis Date    Allergic     Diabetes mellitus ([place] [address] ) 10/25/2019     Subjective      Objective    Physical Exam:   Assessment Type: Assess only  General Appearance: Alert, Awake  Respiratory Pattern: Normal  Chest Assessment: Chest expansion symmetrical  Bilateral Breath Sounds: Diminished    Vitals:  Blood pressure 108/74, pulse 89, temperature 98 5 °F (36 9 °C), temperature source Oral, resp  rate 16, height 5' 6" (1 676 m), weight 50 3 kg (110 lb 14 3 oz), SpO2 97 %  Imaging and other studies: I have personally reviewed pertinent reports  Plan    Respiratory Plan: Discontinue Protocol        Resp Comments: Pt assessed for respiratory protocol  BS diminished, SPO2 97% on room air  Pt denies SOB or distress  No respiratory history  Will D/C respiratory protocol  May reinsitiate if there is a chnage in pt respiratory status

## 2019-10-26 NOTE — PROGRESS NOTES
Progress Note Lucila Ferrer 1980, 46 y o  male MRN: [id number]    Unit/Bed#: -01 Encounter: [id number]    Primary Care Provider: Toribio Loredo MD   Date and time admitted to hospital: 10/25/2019  3:44 PM    * Diabetes mellitus, new diagnosis [facility])  Assessment & Plan  Lab Results   Component Value Date    HGBA1C 14 8 (H) 10/25/2019       Recent Labs     10/26/19  0614 10/26/19  0748 10/26/19  1002 10/26/19  1121   POCGLU 148* 148* 120 275*       Blood Sugar Average: Last 72 hrs:  · (P) 183 6   · Presented from with hyperglycemia after routine lab studies done at his job  · In the ED blood glucose >600 and he was started insulin infusion  · Patient is very athletic and a vegetarian, lives a healthy and active lifestyle  · Positive family history of diabetes  · Transitioned to basal/bolus insulin today  Continue diabetic education  · Suspected type 1 diabetes  Follow up on insulin antibody studies  · Anticipate patient should be stable for discharge tomorrow if blood glucose improved      VTE Pharmacologic Prophylaxis:   Pharmacologic: Low risk  Mechanical VTE Prophylaxis in Place: No    Patient Centered Rounds: I have performed bedside rounds with nursing staff today  Discussions with Specialists or Other Care Team Provider:  Nursing    Education and Discussions with Family / Patient:  Patient    Current Length of Stay: 1 day(s)    Current Patient Status: Inpatient   Certification Statement: The patient will continue to require additional inpatient hospital stay due to Above diagnosis and care plan    Discharge Plan:  Anticipate discharge home tomorrow on basal/bolus insulin regimen    Code Status: Level 1 - Full Code      Subjective:   No new complaints or acute overnight events  Blood glucose improve on insulin infusion      Objective:     Vitals:   Temp (24hrs), Av 4 °F (36 9 °C), Min:98 °F (36 7 °C), Max:98 7 °F (37 1 °C)    Temp:  [98 °F (36 7 °C)-98 7 °F (37 1 °C)] 98 7 °F (37 1 °C)  HR:  [] 87  Resp:  [16-18] 18  BP: (105-128)/(65-83) 107/70  SpO2:  [97 %-100 %] 99 %  Body mass index is 17 9 kg/m²  Input and Output Summary (last 24 hours): Intake/Output Summary (Last 24 hours) at 10/26/2019 1539  Last data filed at 10/26/2019 0914  Gross per 24 hour   Intake 1000 ml   Output 525 ml   Net 475 ml       Physical Exam:  General Appearance:    Alert, cooperative, no distress, appropriately responsive    Head:    Normocephalic, without obvious abnormality, atraumatic, mucous membranes moist    Eyes:    Conjunctiva/corneas clear, EOM's intact   Neck:   Supple   Lungs:     Clear to auscultation bilaterally, respirations unlabored, no crackles or wheeze     Heart:    Regular rate and rhythm, S1 and S2    Abdomen:     Soft, non-tender, bowel sounds active all four quadrants,     no masses, no organomegaly   Extremities:   Extremities normal, atraumatic, no cyanosis or edema   Neurologic:  nonfocal         Additional Data:     Labs:    Results from last 7 days   Lab Units 10/25/19  1629 10/25/19  1618   WBC Thousand/uL  --  4 97   HEMOGLOBIN g/dL  --  13 6   I STAT HEMOGLOBIN g/dl 13 6  13 6  --    HEMATOCRIT %  --  39 3   HEMATOCRIT, ISTAT % 40  40  --    PLATELETS Thousands/uL  --  267   NEUTROS PCT %  --  60   LYMPHS PCT %  --  27   MONOS PCT %  --  11   EOS PCT %  --  1     Results from last 7 days   Lab Units 10/26/19  0617 10/25/19  1629 10/25/19  1618   POTASSIUM mmol/L 3 1*  --  4 3   CHLORIDE mmol/L 107  --  96*   CO2 mmol/L 26  --  29   CO2, I-STAT mmol/L  --  28  29  --    BUN mg/dL 7  --  11   CREATININE mg/dL 0 40*  --  0 63   CALCIUM mg/dL 8 2*  --  9 2   ALK PHOS U/L  --   --  73   ALT U/L  --   --  22   AST U/L  --   --  11   GLUCOSE, ISTAT mg/dl  --  >600*  >600*  --            * I Have Reviewed All Lab Data Listed Above  * Additional Pertinent Lab Tests Reviewed:  [address] Admission Reviewed    Cultures:   Blood Culture: No results found for: BLOODCX  Urine Culture: No results found for: URINECX  Sputum Culture: No components found for: SPUTUMCX  Wound Culture: No results found for: WOUNDCULT    Last 24 Hours Medication List:     Current Facility-Administered Medications:  insulin glargine 12 Units Subcutaneous Daily With Breakfast Kandee Karla, DO    insulin lispro 1-5 Units Subcutaneous TID AC Kandee Karla, DO    insulin lispro 1-5 Units Subcutaneous HS Kandee Karla, DO    insulin lispro 4 Units Subcutaneous TID With Meals Kandee Karla, DO    sodium chloride 125 mL/hr Intravenous Continuous Lashaun Jones MD Last Rate: Stopped (10/26/19 1101)        Today, Patient Was Seen By: Lashaun Jones MD    ** Please Note: Dragon 360 Dictation voice to text software may have been used in the creation of this document   **

## 2019-10-26 NOTE — CONSULTS
Consultation - Lurdes Matta 46 y o  male MRN: [id number]    Unit/Bed#: -01 Encounter: [id number]      Assessment/Plan     Assessment/Plan:  New onset DM: Suspect type 1:  Check ziyad ab, islet cell ab, ia2 ab  Blood sugars improved on insulin drip  Will transition to subQ insulin with Lantus 12 units qam and Humalog 4 units ac plus scale for correction  Turn insulin drip off 1 hour after first lantus injection  Will continue to follow and adjust regimen as needed while inpatient  Monitor for hypoglycemia  Upon discharge, will need to establish with endocrinology  I placed contact info in discharge instructions  Will need prescription for glucometer, test strips, lancets as well as insulin pens and insulin pen needles  Upon discharge, if Lantus is not the preferred basal insulin for the patient's insurance company, we can use Tresiba, Basaglar, Levemir, Toujeo at the same dose instead  Upon discharge, if Humalog is not the preferred mealtime insulin for the patient's insurance, we can use NovoLog, Fiasp, Admelog, or Apidra at the same dose instead  Should have glucometer and insulin injection teaching prior to discharge  CC: Diabetes Consult    History of Present Illness     HPI: Lurdes aMtta is a 46y o  year old male with no prior past medical history who presents for abnormal lab work of hyperglycemia and found to have new onset DM  Was having polyuria and polydipsia  He denies hx of pancreatitis  Fam hx of DM includes father with type 2  No change in weight  No recent corticosteroids  Overall believes he was feeling ok prior to this  Inpatient consult to Endocrinology  Consult performed by: Lamin Olivarez DO  Consult ordered by: Jaelyn Blum MD          Review of Systems   Constitutional: Negative for appetite change and unexpected weight change  HENT: Negative for congestion and trouble swallowing  Eyes: Negative for visual disturbance     Respiratory: Negative for shortness of breath  Cardiovascular: Negative for palpitations and leg swelling  Gastrointestinal: Negative for abdominal pain, nausea and vomiting  Endocrine: Positive for polydipsia and polyuria  Genitourinary: Negative for difficulty urinating and frequency  Musculoskeletal: Negative for arthralgias  Skin: Negative for rash  Neurological: Negative for dizziness and weakness  Psychiatric/Behavioral: Negative for agitation and confusion  Historical Information   Past Medical History:   Diagnosis Date    Allergic     Diabetes mellitus ([facility] [address] ) 10/25/2019     History reviewed  No pertinent surgical history  Social History   Social History     Substance and Sexual Activity   Alcohol Use Yes    Binge frequency: Monthly    Comment: social drinker     Social History     Substance and Sexual Activity   Drug Use No     Social History     Tobacco Use   Smoking Status Former Smoker    Packs/day: 0 50    Years: 22 00    Pack years: 11 00    Last attempt to quit: 11/15/2018    Years since quittin 9   Smokeless Tobacco Never Used     Family History:   Family History   Problem Relation Age of Onset    Hypertension Mother     Diabetes Mother     Diabetes Father     Dementia Father     Substance Abuse Brother        Meds/Allergies   Current Facility-Administered Medications   Medication Dose Route Frequency Provider Last Rate Last Dose    insulin regular (HumuLIN R,NovoLIN R) 1 Units/mL in sodium chloride 0 9 % 100 mL infusion  0 3-21 Units/hr Intravenous Titrated Catia Chin MD 1 mL/hr at 10/26/19 0615 1 Units/hr at 10/26/19 0615    sodium chloride 0 9 % infusion  125 mL/hr Intravenous Continuous Catia Chin  mL/hr at 10/26/19 0411 125 mL/hr at 10/26/19 0411     Allergies   Allergen Reactions    Other Allergic Rhinitis     [employer]       Objective   Vitals: Blood pressure 107/70, pulse 87, temperature 98 7 °F (37 1 °C), temperature source Oral, resp   rate 18, height 5' 6" (1 676 m), weight 50 3 kg (110 lb 14 3 oz), SpO2 99 %  Intake/Output Summary (Last 24 hours) at 10/26/2019 0758  Last data filed at 10/25/2019 2300  Gross per 24 hour   Intake 1000 ml   Output 0 ml   Net 1000 ml     Invasive Devices     Peripheral Intravenous Line            Peripheral IV 10/25/19 Right Antecubital less than 1 day                Physical Exam   Constitutional: He is oriented to person, place, and time  He appears well-developed and well-nourished  No distress  HENT:   Head: Normocephalic and atraumatic  Neck: Normal range of motion  Neck supple  Cardiovascular: Normal rate and regular rhythm  Pulmonary/Chest: Effort normal and breath sounds normal  No respiratory distress  Abdominal: Soft  Bowel sounds are normal    Musculoskeletal: Normal range of motion  He exhibits no edema  Neurological: He is alert and oriented to person, place, and time  He exhibits normal muscle tone  Skin: Skin is warm and dry  No rash noted  He is not diaphoretic  Psychiatric: He has a normal mood and affect  His behavior is normal    Vitals reviewed  The history was obtained from the review of the chart, patient  Lab Results:   Results from last 7 days   Lab Units 10/25/19  1618   HEMOGLOBIN A1C % 14 8*     Lab Results   Component Value Date    WBC 4 97 10/25/2019    HGB 13 6 10/25/2019    HGB 13 6 10/25/2019    HCT 40 10/25/2019    HCT 40 10/25/2019    MCV 89 10/25/2019     10/25/2019     Lab Results   Component Value Date/Time    BUN 7 10/26/2019 06:17 AM    K 3 1 (L) 10/26/2019 06:17 AM     10/26/2019 06:17 AM    CO2 26 10/26/2019 06:17 AM    CO2 29 10/25/2019 04:29 PM    CO2 28 10/25/2019 04:29 PM    CREATININE 0 40 (L) 10/26/2019 06:17 AM    AST 11 10/25/2019 04:18 PM    ALT 22 10/25/2019 04:18 PM    ALB 3 5 10/25/2019 04:18 PM     No results for input(s): CHOL, HDL, LDL, TRIG, VLDL in the last 72 hours    No results found for: Sylvain Katy  POC Glucose (mg/dl)   Date Value   10/26/2019 148 (H)   10/26/2019 113   10/26/2019 96   10/25/2019 116   10/25/2019 240 (H)   10/25/2019 444 (H)       Imaging Studies: No imaging to review  Portions of the record may have been created with voice recognition software  Occasional wrong word or "sound a like" substitutions may have occurred due to the inherent limitations of voice recognition software  Read the chart carefully and recognize, using context, where substitutions have occurred

## 2019-10-27 VITALS
OXYGEN SATURATION: 99 % | DIASTOLIC BLOOD PRESSURE: 71 MMHG | BODY MASS INDEX: 17.33 KG/M2 | RESPIRATION RATE: 18 BRPM | HEIGHT: 66 IN | HEART RATE: 94 BPM | TEMPERATURE: 98.3 F | SYSTOLIC BLOOD PRESSURE: 101 MMHG | WEIGHT: 107.81 LBS

## 2019-10-27 LAB
GLUCOSE SERPL-MCNC: 286 MG/DL (ref 65–140)
GLUCOSE SERPL-MCNC: 431 MG/DL (ref 65–140)

## 2019-10-27 PROCEDURE — 86341 ISLET CELL ANTIBODY: CPT | Performed by: INTERNAL MEDICINE

## 2019-10-27 PROCEDURE — 82948 REAGENT STRIP/BLOOD GLUCOSE: CPT

## 2019-10-27 PROCEDURE — 99239 HOSP IP/OBS DSCHRG MGMT >30: CPT | Performed by: INTERNAL MEDICINE

## 2019-10-27 PROCEDURE — 83519 RIA NONANTIBODY: CPT | Performed by: INTERNAL MEDICINE

## 2019-10-27 PROCEDURE — 86337 INSULIN ANTIBODIES: CPT | Performed by: INTERNAL MEDICINE

## 2019-10-27 RX ORDER — SYRINGE AND NEEDLE,INSULIN,1ML
SYRINGE, EMPTY DISPOSABLE MISCELLANEOUS 4 TIMES DAILY
Qty: 100 EACH | Refills: 0 | Status: SHIPPED | OUTPATIENT
Start: 2019-10-27 | End: 2020-02-11 | Stop reason: SDUPTHER

## 2019-10-27 RX ADMIN — INSULIN LISPRO 5 UNITS: 100 INJECTION, SOLUTION INTRAVENOUS; SUBCUTANEOUS at 12:11

## 2019-10-27 RX ADMIN — INSULIN LISPRO 3 UNITS: 100 INJECTION, SOLUTION INTRAVENOUS; SUBCUTANEOUS at 08:27

## 2019-10-27 RX ADMIN — INSULIN GLARGINE 18 UNITS: 100 INJECTION, SOLUTION SUBCUTANEOUS at 08:29

## 2019-10-27 NOTE — PROGRESS NOTES
Diabetes education completed & booklet given  Taught pen and syringe insulin administration w/ teachback and return demo  Patient states his dad was diabetic and he helped w/ insulin administration so he is comfortable  Patient called [facility] and they state 10ml 70/30 insulin is $24 88  Pt agreeable to this price

## 2019-10-27 NOTE — DISCHARGE SUMMARY
Discharge Summary - [address] [facility]    Patient Information: Janet Oliva 46 y o  male MRN: [id number]  Unit/Bed#: -01 Encounter: [id number]    Discharging Physician / Practitioner: Gray Flores MD  PCP: Shazia Mcclure MD  Admission Date: 10/25/2019  Discharge Date: 10/27/19    Reason for Admission:  New onset diabetes    Discharge Diagnoses:     Principal Problem:    Diabetes mellitus, new diagnosis [facility])      Consultations During Hospital Stay:  · Endocrinology - discussed with Dr Arthur Ahr    Procedures Performed:   · None    Significant findings:  · Elevated blood glucose    Hospital Course:   Janet Oliva is a 46 y o  male patient who originally presented to the hospital on 10/25/2019 due to elevated blood glucose  Patient was referred to the ED by his primary care physician for further evaluation after routine outpatient blood work showed significant hyperglycemia  The patient had no known history of diabetes  He did have positive family history however  He was started on insulin infusion and subsequently transition to basal/bolus insulin  Patient received diabetic education during his hospital course including Education on how to self inject insulin and check his blood sugars  He was given prescriptions for glucometer, lancets, test strips, insulin pen and pen needles prior to discharge  He was seen by endocrinology and laboratory testing were sent for autoimmune studies to evaluate for type 1 diabetes  Studies were pending at the time of his discharge and he is to follow up with the endocrinologist as instructed within 2 weeks  He remained stable through his hospital course and was cleared for discharge home on 10/27/19  Condition at Discharge: stable     Discharge Day Visit / Exam:     Subjective:  No new complaints or acute overnight events    Blood glucose better controlled    Vitals: Blood Pressure: 101/71 (10/27/19 0700)  Pulse: 94 (10/27/19 0700)  Temperature: 98 3 °F (36 8 °C) (10/27/19 0700)  Temp Source: Oral (10/27/19 0700)  Respirations: 18 (10/27/19 0700)  Height: 5' 6" (167 6 cm) (10/25/19 1930)  Weight - Scale: 48 9 kg (107 lb 12 9 oz) (10/27/19 0432)  SpO2: 99 % (10/27/19 0700)    General Appearance:    Alert, cooperative, no distress, appropriately responsive    Head:    Normocephalic, without obvious abnormality, atraumatic, mucous membranes moist    Eyes:    Conjunctiva/corneas clear, EOM's intact   Neck:   Supple   Lungs:     Clear to auscultation bilaterally, respirations unlabored, no crackles or wheeze     Heart:    Regular rate and rhythm, S1 and S2    Abdomen:     Soft, non-tender, bowel sounds active all four quadrants,     no masses, no organomegaly   Extremities:   Extremities normal, atraumatic, no cyanosis or edema   Neurologic:  nonfocal      Discharge instructions/Information to patient and family:   See after visit summary for information provided to patient and family  Provisions for Follow-Up Care:  See after visit summary for information related to follow-up care and any pertinent home health orders  Disposition: Home      Discharge Statement:  I spent >30 minutes discharging the patient  This time was spent on the day of discharge  I had direct contact with the patient on the day of discharge  Greater than 50% of the total time was spent examining patient, answering all patient questions, arranging and discussing plan of care with patient as well as directly providing post-discharge instructions  Additional time then spent on discharge activities  Discharge Medications:  See after visit summary for reconciled discharge medications provided to patient and family  ** Please Note: Dragon 360 Dictation voice to text software may have been used in the creation of this document   **

## 2019-10-27 NOTE — UTILIZATION REVIEW
Initial Clinical Review    Admission: Date/Time/Statement: Inpatient Admission Orders (From admission, onward)     Ordered        10/25/19 1656  Inpatient Admission (expected length of stay for this patient Order details is greater than two midnights)  Once                   Orders Placed This Encounter   Procedures    Inpatient Admission (expected length of stay for this patient Order details is greater than two midnights)     Standing Status:   Standing     Number of Occurrences:   1     Order Specific Question:   Admitting Physician     Answer:   Ed Beckie [[id number]]     Order Specific Question:   Level of Care     Answer:   Med Surg [16]     Order Specific Question:   Estimated length of stay     Answer:   More than 2 Midnights     Order Specific Question:   Certification     Answer:   I certify that inpatient services are medically necessary for this patient for a duration of greater than two midnights  See H&P and MD Progress Notes for additional information about the patient's course of treatment  ED Arrival Information     Expected Arrival Acuity Means of Arrival Escorted By Service Admission Type    10/25/2019  10/25/2019 15:30 Emergent Walk-In Self Hospitalist Emergency    Arrival Complaint    High blood sugar        Chief Complaint   Patient presents with    Hyperglycemia - no symptoms     pt states at work had blood work checked glucose was high, was seen by PCP today did more blood work glucose still high was sent to ER     Assessment/Plan:   47 YO MALE AMBULATORY TO ER FROM MD OFFICE FOR ELEVATED BLOOD SUGAR  HAD OUTPATIENT LABS DONE FOR WORK, FOUND GLUCOSE TO BE ELEVATED, SAW PCP, HGB A1C>14, BLOOD SUGAR>600  REPORTS WEIGHT LOSS, THOUGHT FROM HEALTHY LIFESTYLE, +POLYURIA & POLYDIPSIA APPROX 2 MONTHS  ADMITTED TO INPATIENT STATUS FOR NEW ONSET DM  ENDOCRINOLOGY & NUTRITION CONSULTED, STARTED ON IV INSULIN GTT & IVF     ED Triage Vitals   Temperature Pulse Respirations Blood Pressure SpO2 10/25/19 1546 10/25/19 1546 10/25/19 1546 10/25/19 1546 10/25/19 1546   98 °F (36 7 °C) (!) 107 16 128/71 100 %      Temp Source Heart Rate Source Patient Position - Orthostatic VS BP Location FiO2 (%)   10/25/19 1546 10/25/19 1645 10/25/19 1645 10/25/19 1645 --   Oral Monitor Lying Left arm       Pain Score       --               Wt Readings from Last 1 Encounters:   10/27/19 48 9 kg (107 lb 12 9 oz)     Additional Vital Signs:   10/25/19 2040    89  16      97 %  None (Room air)     10/25/19 1930  98 5 °F (36 9 °C)  92  16  108/74    99 %  None (Room air)  Lying   10/25/19 1645    91  18  124/83    100 %  None (Room air)  Lying   10/25/19 1546  98 °F (36 7 °C)  107Abnormal   16  128/71    100 %       Pertinent Labs/Diagnostic Test Results:   Results from last 7 days   Lab Units 10/25/19  1629 10/25/19  1618 10/25/19  1438   WBC Thousand/uL  --  4 97  --    HEMOGLOBIN g/dL  --  13 6 600   I STAT HEMOGLOBIN g/dl 13 6  13 6  --   --    HEMATOCRIT %  --  39 3  --    HEMATOCRIT, ISTAT % 40  40  --   --    PLATELETS Thousands/uL  --  267  --    NEUTROS ABS Thousands/µL  --  3 00  --      Results from last 7 days   Lab Units 10/26/19  0617 10/25/19  1629 10/25/19  1618   SODIUM mmol/L 141  --  131*   POTASSIUM mmol/L 3 1*  --  4 3   CHLORIDE mmol/L 107  --  96*   CO2 mmol/L 26  --  29   CO2, I-STAT mmol/L  --  28  29  --    AGAP mmol/L  --  16*  --    ANION GAP mmol/L 8  --  6   BUN mg/dL 7  --  11   CREATININE mg/dL 0 40*  --  0 63   EGFR ml/min/1 73sq m 151 170 125   CALCIUM mg/dL 8 2*  --  9 2   CALCIUM, IONIZED, ISTAT mmol/L  --  1 24  1 23  --      Results from last 7 days   Lab Units 10/25/19  1618   AST U/L 11   ALT U/L 22   ALK PHOS U/L 73   TOTAL PROTEIN g/dL 7 0   ALBUMIN g/dL 3 5   TOTAL BILIRUBIN mg/dL 0 50     Results from last 7 days   Lab Units 10/26/19  2116 10/26/19  1547 10/26/19  1121 10/26/19  1002 10/26/19  0748 10/26/19  0614 10/26/19  0411 10/26/19  0205 10/26/19  0003   POC GLUCOSE mg/dl 276* 473* 275* 120 148* 148* 136 113 96     Results from last 7 days   Lab Units 10/26/19  0617 10/25/19  1618   GLUCOSE RANDOM mg/dL 145* 686*     Results from last 7 days   Lab Units 10/25/19  1618 10/25/19  1448   HEMOGLOBIN A1C % 14 8* 14*   EAG mg/dl 378  --      BETA-HYDROXYBUTYRATE   Date Value Ref Range Status   10/25/2019 1 1 (H) <0 6 mmol/L Final      Results from last 7 days   Lab Units 10/25/19  1629   PH, LILIAN I-STAT  7 371   PCO2, LILIAN ISTAT mm HG 47 7   PO2, LILIAN ISTAT mm HG 39 0   HCO3, LILIAN ISTAT mmol/L 27 7   I STAT BASE EXC mmol/L 2   I STAT O2 SAT % 71     Results from last 7 days   Lab Units 10/26/19  0617 10/25/19  1618   TSH 3RD GENERATON uIU/mL 1 219 1 982     EKG=Normal sinus rhythm  Left anterior fascicular block  ED Treatment:   Medication Administration from 10/25/2019 1450 to 10/25/2019 1845       Date/Time Order Dose Route     10/25/2019 1619 sodium chloride 0 9 % bolus 1,000 mL 1,000 mL Intravenous     10/25/2019 1705 sodium chloride 0 9 % bolus 1,000 mL 1,000 mL Intravenous     10/25/2019 1819 insulin regular (HumuLIN R,NovoLIN R) 1 Units/mL in sodium chloride 0 9 % 100 mL infusion 10 Units/hr Intravenous     10/25/2019 1646 insulin regular (HumuLIN R,NovoLIN R) 1 Units/mL in sodium chloride 0 9 % 100 mL infusion 10 Units/hr Intravenous        Past Medical History:   Diagnosis Date    Allergic     Diabetes mellitus ([place] [id number] ) 10/25/2019     Present on Admission:   Diabetes mellitus, new diagnosis ([place] [address] )  Admitting Diagnosis: Dehydration [E86 0]  High blood sugar [R73 9]  Uncontrolled other specified diabetes mellitus with hyperglycemia ([place] [address] ) [E13 65]  Age/Sex: 46 y o  male  Admission Orders:  MED SURG  ACCUCHECKS PER IV INSULIN GTT PROTOCOL  CONSULT NUTRITION  CONSULT ENDOCRINOLOGY  Medications:  sodium chloride 125 mL/hr Intravenous Continuous     insulin regular (HumuLIN R,NovoLIN R) 1 Units/mL in sodium chloride 0 9 % 100 mL infusion   Rate: 0 3-21 mL/hr Dose: 0 3-21 Units/hr  Freq: Titrated Route: IV    Network Utilization Review Department  [email or web address]  ATTENTION: Please call with any questions or concerns to [phone] and carefully listen to the prompts so that you are directed to the right person  All voicemails are confidential   Maribell Lazaro all requests for admission clinical reviews, approved or denied determinations and any other requests to dedicated fax number below belonging to the campus where the patient is receiving treatment    FACILITY NAME UR FAX NUMBER   ADMISSION DENIALS (Administrative/Medical Necessity) [address] [facility] (Maternity/NICU/Pediatrics) [phone]   [facility] [id number] [place] [address] 300 [facility] [phone]   [address] [place] [address] 1525 [facility] [phone]   [place] Valeria [address] [phone]

## 2019-10-28 ENCOUNTER — TRANSITIONAL CARE MANAGEMENT (OUTPATIENT)
Dept: FAMILY MEDICINE CLINIC | Facility: CLINIC | Age: 39
End: 2019-10-28

## 2019-10-28 ENCOUNTER — TELEPHONE (OUTPATIENT)
Dept: FAMILY MEDICINE CLINIC | Facility: CLINIC | Age: 39
End: 2019-10-28

## 2019-10-28 NOTE — TELEPHONE ENCOUNTER
Patient's completed health screening for completed in box at   Form was faxed to employer  Patient notified

## 2019-10-30 LAB — PANC ISLET CELL AB TITR SER: NEGATIVE {TITER}

## 2019-10-30 NOTE — UTILIZATION REVIEW
Notification of Discharge  This is a Notification of Discharge from our facility [address]  Please be advised that this patient has been discharge from our facility  Below you will find the admission and discharge date and time including the patients disposition  PRESENTATION DATE: 10/25/2019  3:44 PM  OBS ADMISSION DATE: N/A  IP ADMISSION DATE: 10/25/19 1656   DISCHARGE DATE: 10/27/2019  1:55 PM  DISPOSITION: Home/Self Care Home/Self Care   Admission Orders listed below:  Admission Orders (From admission, onward)     Ordered        10/25/19 1656  Inpatient Admission (expected length of stay for this patient Order details is greater than two midnights)  Once                 Please contact the UR Department if additional information is required to close this patient's authorization/case  Network Utilization Review Department  [email or web address]  Main: [phone]  ATTENTION: Please call with any questions or concerns to [phone] and carefully listen to the prompts so that you are directed to the right person  All voicemails are confidential   Darek Nicole all requests for admission clinical reviews, approved or denied determinations and any other requests to dedicated fax number below belonging to the campus where the patient is receiving treatment    FACILITY NAME UR FAX NUMBER   ADMISSION DENIALS (Administrative/Medical Necessity) [address] [facility] (Maternity/NICU/Pediatrics) [phone]   [facility] [address] [place] [address] [address] [facility] [phone]   [address] [place] [address] 1525 [facility] [phone]   Carroll Harris 2000 [facility] [address] [place] [address] [phone]

## 2019-10-31 ENCOUNTER — OFFICE VISIT (OUTPATIENT)
Dept: FAMILY MEDICINE CLINIC | Facility: CLINIC | Age: 39
End: 2019-10-31
Payer: COMMERCIAL

## 2019-10-31 VITALS
HEART RATE: 112 BPM | BODY MASS INDEX: 19.61 KG/M2 | WEIGHT: 122 LBS | RESPIRATION RATE: 18 BRPM | TEMPERATURE: 98.1 F | DIASTOLIC BLOOD PRESSURE: 62 MMHG | OXYGEN SATURATION: 97 % | SYSTOLIC BLOOD PRESSURE: 98 MMHG | HEIGHT: 66 IN

## 2019-10-31 DIAGNOSIS — Z23 NEED FOR VACCINATION: ICD-10-CM

## 2019-10-31 DIAGNOSIS — E11.9 DIABETES MELLITUS (HCC): Primary | ICD-10-CM

## 2019-10-31 PROBLEM — R73.01 IFG (IMPAIRED FASTING GLUCOSE): Status: RESOLVED | Noted: 2019-01-25 | Resolved: 2019-10-31

## 2019-10-31 LAB — GAD65 AB SER-ACNC: <5 U/ML (ref 0–5)

## 2019-10-31 PROCEDURE — 82570 ASSAY OF URINE CREATININE: CPT | Performed by: FAMILY MEDICINE

## 2019-10-31 PROCEDURE — 90746 HEPB VACCINE 3 DOSE ADULT IM: CPT | Performed by: FAMILY MEDICINE

## 2019-10-31 PROCEDURE — 82043 UR ALBUMIN QUANTITATIVE: CPT | Performed by: FAMILY MEDICINE

## 2019-10-31 PROCEDURE — 90472 IMMUNIZATION ADMIN EACH ADD: CPT | Performed by: FAMILY MEDICINE

## 2019-10-31 PROCEDURE — 90732 PPSV23 VACC 2 YRS+ SUBQ/IM: CPT | Performed by: FAMILY MEDICINE

## 2019-10-31 PROCEDURE — 99495 TRANSJ CARE MGMT MOD F2F 14D: CPT | Performed by: FAMILY MEDICINE

## 2019-10-31 PROCEDURE — 90471 IMMUNIZATION ADMIN: CPT | Performed by: FAMILY MEDICINE

## 2019-10-31 PROCEDURE — 90686 IIV4 VACC NO PRSV 0.5 ML IM: CPT | Performed by: FAMILY MEDICINE

## 2019-10-31 NOTE — PROGRESS NOTES
Transition of Care Management Visit  Chintan Maciel 46 y o  male   MRN: [id number]    Assessment/Plan: Chintan Maciel is a 46 y o  male with:     Diabetes mellitus, new diagnosis ([facility] )    Lab Results   Component Value Date    HGBA1C 14 8 (H) 10/25/2019   Likely late onset Type 1 DM, but Ab are pending  FBG 200s and PPBG 170-190s, increase Lantus from 25u qam to 28u qam and 20u qHS to 25u qHS  Call with BG readings in 1 week  Microalbumin:creat urine test sent, check FLP with A1c in 3 months  PPSV23, Hep B#1 and Flu shot given today  Refer to ophtho, Endo and medical nutrition  Normal foot exam today    RTC 1 month for Hep B#2 and f/u if unable to be seen by Endocrinology    Subjective:  Chintan Maciel is a 46 y o  male here for Transition Care Management Visit  Pt here for hospital f/u, he was admitted after in office labs showed likely new onset diabetes  He was admitted, treated with IVF and insulin infusions, and transition to Lantus and Humalog, but insurance did not cover it, so is now on 70/30 25uqam and 20uqHS  FBG readings are >200 most mornings and in the evening it is 170-190s  Denies hypoglycemic events  He did not receive any vaccines in the hospital     Hospital Summary:  Admission Date: 10/25/2019  Discharge Date: 10/27/19    Chintan Maciel is a 46 y o  male patient who originally presented to the hospital on 10/25/2019 due to elevated blood glucose  Patient was referred to the ED by his primary care physician for further evaluation after routine outpatient blood work showed significant hyperglycemia  The patient had no known history of diabetes  He did have positive family history however  He was started on insulin infusion and subsequently transition to basal/bolus insulin  Patient received diabetic education during his hospital course including Education on how to self inject insulin and check his blood sugars    He was given prescriptions for glucometer, lancets, test strips, insulin pen and pen needles prior to discharge  He was seen by endocrinology and laboratory testing were sent for autoimmune studies to evaluate for type 1 diabetes  Studies were pending at the time of his discharge and he is to follow up with the endocrinologist as instructed within 2 weeks  He remained stable through his hospital course and was cleared for discharge home on 10/27/19  Need for Follow-up: with Endocrinology    Changed/New Medications: 70/30 25u qam and 20u before dinner    I personally reviewed the following images: No images to review    Review of Systems   Constitutional: Negative for fatigue and fever  Respiratory: Negative for shortness of breath  Endocrine: Positive for polydipsia and polyuria  Neurological: Negative for dizziness and headaches  Patient Active Problem List    Diagnosis Date Noted    [facility] 10/25/2019    Former smoker 01/25/2019       Current Outpatient Medications:     INS SYRINGE/NEEDLE 1CC/28G (B-D INS SYR MICROFINE 1CC/28G) 28G X 1/2" 1 ML MISC, by Does not apply route 4 (four) times a day, Disp: 100 each, Rfl: 0    insulin NPH-insulin regular (NovoLIN 70/30) 100 units/mL subcutaneous injection, Inject SQ 25u with dinner, 28 units with breakfast, Disp: 10 mL, Rfl: 0    Objective:  BP 98/62 (BP Location: Left arm, Patient Position: Sitting, Cuff Size: Standard)   Pulse (!) 112   Temp 98 1 °F (36 7 °C)   Resp 18   Ht 5' 6" (1 676 m)   Wt 55 3 kg (122 lb)   SpO2 97%   BMI 19 69 kg/m²   Physical Exam   Constitutional: He is oriented to person, place, and time  He appears well-developed and well-nourished  No distress  Cardiovascular: Normal rate, regular rhythm and normal heart sounds  Pulses are no weak pulses  No murmur heard  Pulses:       Dorsalis pedis pulses are 2+ on the right side, and 2+ on the left side  Posterior tibial pulses are 2+ on the right side, and 2+ on the left side     Pulmonary/Chest: Effort normal and breath sounds normal  No respiratory distress  He has no wheezes  He has no rales  Feet:   Right Foot:   Skin Integrity: Negative for ulcer, skin breakdown, erythema, warmth, callus or dry skin  Left Foot:   Skin Integrity: Negative for ulcer, skin breakdown, erythema, warmth, callus or dry skin  Neurological: He is alert and oriented to person, place, and time  Skin: He is not diaphoretic  Psychiatric: He has a normal mood and affect  Vitals reviewed  Patient's shoes and socks removed  Right Foot/Ankle   Right Foot Inspection  Skin Exam: skin normal and skin intact no dry skin, no warmth, no callus, no erythema, no maceration, no abnormal color, no pre-ulcer, no ulcer and no callus                          Toe Exam: ROM and strength within normal limits  Sensory     Proprioception: intact   Monofilament testing: intact  Vascular  Capillary refills: < 3 seconds  The right DP pulse is 2+  The right PT pulse is 2+  Left Foot/Ankle  Left Foot Inspection  Skin Exam: skin normal and skin intactno dry skin, no warmth, no erythema, no maceration, normal color, no pre-ulcer, no ulcer and no callus                         Toe Exam: ROM and strength within normal limits                   Sensory     Proprioception: intact  Monofilament: intact  Vascular  Capillary refills: < 3 seconds  The left DP pulse is 2+  The left PT pulse is 2+  Assign Risk Category:  No deformity present; No loss of protective sensation; No weak pulses       Risk: 0      Transitional Care Management Review:  Feli Neil is a 46 y o  male here for TCM follow up       During the TCM phone call patient stated:  TCM Call (since 9/30/2019)     Date and time call was made  10/28/2019 11:49 AM    Hospital care reviewed  Records reviewed    Patient was hospitialized at  [address] [place] [address]    Date of Admission  10/25/19    Date of discharge  10/27/19    Diagnosis  Diabetes Mellitus New Diagnosis     Disposition  Home    Were the patients medications reviewed and updated  Yes    Current Symptoms  None      TCM Call (since 9/30/2019)     Post hospital issues  None    Should patient be enrolled in anticoag monitoring? No    Scheduled for follow up? Yes    Did you obtain your prescribed medications  Yes    Do you need help managing your prescriptions or medications  No    Is transportation to your appointment needed  No    I have advised the patient to call PCP with any new or worsening symptoms  Sandi Ochoa, Medical Assistant     Living Arrangements  Spouse or Significiant other    Support System  Spouse    The type of support provided  Emotional; Physical    Do you have social support  Yes, as much as I need    Are you recieving any outpatient services  No    Are you recieving home care services  No    Are you using any community resources  No    Current waiver services  No    Have you fallen in the last 12 months  No    Interperter language line needed  No    Counseling  Patient          Valeri Mcguire MD    Note: Portions of the record may have been created with voice recognition software  Occasional wrong word or "sound a like" substitutions may have occurred due to the inherent limitations of voice recognition software  Read the chart carefully and recognize, using context, where substitutions have occurred

## 2019-11-01 LAB
CREAT UR-MCNC: 23.9 MG/DL
MICROALBUMIN UR-MCNC: <5 MG/L (ref 0–20)
MICROALBUMIN/CREAT 24H UR: <21 MG/G CREATININE (ref 0–30)

## 2019-11-03 LAB — INSULIN AB SER-ACNC: <5 UU/ML

## 2019-11-05 LAB — ISLET CELL512 AB SER-ACNC: 16 U/ML

## 2019-11-21 ENCOUNTER — OFFICE VISIT (OUTPATIENT)
Dept: ENDOCRINOLOGY | Facility: CLINIC | Age: 39
End: 2019-11-21
Payer: COMMERCIAL

## 2019-11-21 VITALS
SYSTOLIC BLOOD PRESSURE: 114 MMHG | WEIGHT: 129 LBS | BODY MASS INDEX: 20.73 KG/M2 | HEIGHT: 66 IN | HEART RATE: 111 BPM | DIASTOLIC BLOOD PRESSURE: 70 MMHG

## 2019-11-21 DIAGNOSIS — E10.65 TYPE 1 DIABETES MELLITUS WITH HYPERGLYCEMIA (HCC): Primary | ICD-10-CM

## 2019-11-21 PROCEDURE — 99214 OFFICE O/P EST MOD 30 MIN: CPT | Performed by: INTERNAL MEDICINE

## 2019-11-21 RX ORDER — INSULIN GLARGINE 100 [IU]/ML
26 INJECTION, SOLUTION SUBCUTANEOUS EVERY MORNING
Qty: 30 ML | Refills: 2 | Status: SHIPPED | OUTPATIENT
Start: 2019-11-21 | End: 2019-12-04 | Stop reason: DRUGHIGH

## 2019-11-21 NOTE — PROGRESS NOTES
ENDOCRINOLOGY  FOLLOW UP VISIT      Reason for Endocrine Consult/Chief Complaint:  Diabetes management    Referring Provider: Leonard Wright MD        ?  Medical Decision Making:     Impression  1  SIA      Recommendations:    I discussed the pathophysiology of SIA and reviewed therapy options based on ADA 2019 guidelines  He is consistently having hyperglycemia above 200  Now that he has met his insurance deductible will switch him to basal bolus insulin which is more physiologic in nature  Will stop 70/30 insulin and start Lantus 26 units taken in the morning and Humalog 8 units taken before each meal   He will let me know if these basal and bolus insulins are too expensive because then we can switch to an alternative  Instructed to check his blood sugar fasting every day and alternate pre meals and q h s   Instructed to send me blood sugar log in 1 week for review  Instructed to call my office if he is consistently having blood sugars above 300 or any hypoglycemia less than 70  Will check a fasting C-peptide in 1 month to assess for endogenous insulin production  He is using a lot of insulin compared to his body weight as is seen in patients with SIA  He has some degree of insulin resistance  At the next appointment will discuss use of GLP-1 agonist therapy and DPP-4 inhibitors in preventing the progression of loss of beta cells  Encouraged to follow up with Ophthalmology  Return to clinic in 5 weeks    A total of 25 minutes were spent face-to-face with the patient during this encounter and over half the time was spent on counseling and coordination of care  We discussed in depth the pathophysiology, treatment goals, treatment options of SIA  Evangelista AMES  History of Present Illness:   Mr Meenakshi Castaneda is a 61-year-old male who presents for diabetes management  He was hospitalized in October 2019 with elevated blood sugars    He was seen by the Endocrinology service and autoantibodies were checked  His IA -2 antibody was positive  His admission blood sugar was 686  His beta hydroxybutyrate was mildly elevated at 1 1   ?  PMH-SIA  PSH-none known  FHx-father with diabetes type 2  SHx-quit smoking 1 year ago, social ETOH, work 2 jobs [employer]      Type of DM:  New onset  Age of onset:  October 2019  Most recent A1C:  14 8% October 2019  Present home regimen:  Novolin 70/30 28 units in the morning and 25 with dinner   SMBG at home: 200s before breakfast and dinner   Hypoglycemic events: none   Microvascular complications: none known   Macrovascular complications: none known   Nutrition: three meals, roasted chickpea brocolli breakfast, bean chili lunch, dinner veggie burger chips, vegan   Exercise: byron cam[employer] studying     Events since last visit:    blood sugars are consistently running above 200, no hypoglycemia  ? Review of Systems:   Review of Systems   Constitutional: Negative for appetite change, chills, diaphoresis, fatigue, fever and unexpected weight change  HENT: Negative for congestion, ear pain, hearing loss, rhinorrhea, sinus pressure, sinus pain, sore throat, trouble swallowing and voice change  Eyes: Negative for photophobia, redness and visual disturbance  Respiratory: Negative for apnea, cough, chest tightness, shortness of breath, wheezing and stridor  Cardiovascular: Negative for chest pain, palpitations and leg swelling  Gastrointestinal: Negative for abdominal distention, abdominal pain, constipation, diarrhea, nausea and vomiting  Endocrine: Negative for cold intolerance, heat intolerance, polydipsia, polyphagia and polyuria  Genitourinary: Negative for difficulty urinating, dysuria, flank pain, frequency, hematuria and urgency  Musculoskeletal: Negative for arthralgias, back pain, gait problem, joint swelling and myalgias  Skin: Negative for color change, pallor, rash and wound     Allergic/Immunologic: Negative for immunocompromised state  Neurological: Negative for dizziness, tremors, syncope, weakness, light-headedness and headaches  Hematological: Negative for adenopathy  Does not bruise/bleed easily  Psychiatric/Behavioral: Negative for confusion and sleep disturbance  The patient is not nervous/anxious  ? Patient History:     Past Medical History:   Diagnosis Date    Allergic     Diabetes mellitus ([facility] [address] ) 10/25/2019     History reviewed  No pertinent surgical history    Social History     Socioeconomic History    Marital status: /Civil Union     Spouse name: Not on file    Number of children: Not on file    Years of education: Not on file    Highest education level: Not on file   Occupational History    Not on file   Social Needs    Financial resource strain: Not on file    Food insecurity:     Worry: Not on file     Inability: Not on file    Transportation needs:     Medical: Not on file     Non-medical: Not on file   Tobacco Use    Smoking status: Former Smoker     Packs/day: 0 50     Years: 22 00     Pack years: 11 00     Last attempt to quit: 11/15/2018     Years since quittin 0    Smokeless tobacco: Never Used   Substance and Sexual Activity    Alcohol use: Yes     Frequency: Never     Drinks per session: 1 or 2     Binge frequency: Monthly     Comment: social drinker    Drug use: No    Sexual activity: Yes     Partners: Female     Birth control/protection: Condom Male   Lifestyle    Physical activity:     Days per week: Not on file     Minutes per session: Not on file    Stress: Not on file   Relationships    Social connections:     Talks on phone: Not on file     Gets together: Not on file     Attends Anabaptism service: Not on file     Active member of club or organization: Not on file     Attends meetings of clubs or organizations: Not on file     Relationship status: Not on file    Intimate partner violence:     Fear of current or ex partner: Not on file     Emotionally abused: Not on file     Physically abused: Not on file     Forced sexual activity: Not on file   Other Topics Concern    Not on file   Social History Narrative    Not on file     Family History   Problem Relation Age of Onset    Hypertension Mother     Diabetes Mother     Diabetes Father     Dementia Father     Substance Abuse Brother        Current Medications: At the time this note was written these were the medications the patient was on  Current Outpatient Medications   Medication Sig Dispense Refill    INS SYRINGE/NEEDLE 1CC/28G (B-D INS SYR MICROFINE 1CC/28G) 28G X 1/2" 1 ML MISC by Does not apply route 4 (four) times a day 100 each 0    insulin NPH-insulin regular (NovoLIN 70/30) 100 units/mL subcutaneous injection Inject SQ 25u with dinner, 28 units with breakfast 10 mL 0     No current facility-administered medications for this visit  Allergies: Other    Physical Exam:   Vital Signs:   /70   Pulse (!) 111   Ht 5' 6" (1 676 m)   Wt 58 5 kg (129 lb)   BMI 20 82 kg/m²     Physical Exam   Constitutional: He is oriented to person, place, and time  He appears well-developed and well-nourished  HENT:   Head: Normocephalic and atraumatic  Nose: Nose normal    Mouth/Throat: Oropharynx is clear and moist  No oropharyngeal exudate  Eyes: Pupils are equal, round, and reactive to light  Conjunctivae and EOM are normal  No scleral icterus  Neck: Normal range of motion  Neck supple  No thyromegaly present  Cardiovascular: Normal rate, regular rhythm and normal heart sounds  Exam reveals no gallop and no friction rub  No murmur heard  Pulmonary/Chest: Effort normal and breath sounds normal  No stridor  No respiratory distress  He has no wheezes  He has no rales  Abdominal: Soft  Bowel sounds are normal  He exhibits no distension and no mass  There is no tenderness  There is no rebound and no guarding  Musculoskeletal: Normal range of motion  He exhibits no edema  Lymphadenopathy:     He has no cervical adenopathy  Neurological: He is alert and oriented to person, place, and time  Skin: Skin is warm and dry  No rash noted  No erythema  No pallor  Psychiatric: He has a normal mood and affect   His behavior is normal  Judgment and thought content normal         Labs and Imaging:      Component      Latest Ref Rng & Units 10/25/2019 10/27/2019   Hemoglobin A1C      4 2 - 6 3 % 14 8 (H)    EAG      mg/dl 378    ISLET CELL ANTIBODY      Neg:<1:1  Negative   Glutaminc Acid Decarboxylase 65 Ab      0 0 - 5 0 U/mL  <5 0   IA-2 AUTOANTIBODIES      U/mL  16 (H)   INSULIN AUTOANTIBODY      uU/mL  <5 0

## 2019-11-21 NOTE — PATIENT INSTRUCTIONS
Have labs done in 4 weeks fasting    Start lantus 26 units in the morning    Start humalog 8 units before each meal    Stop the 70/30 premixed insulin    Follow up in 5 weeks

## 2019-11-25 DIAGNOSIS — E10.65 TYPE 1 DIABETES MELLITUS WITH HYPERGLYCEMIA (HCC): Primary | ICD-10-CM

## 2019-12-03 ENCOUNTER — TELEPHONE (OUTPATIENT)
Dept: ENDOCRINOLOGY | Facility: CLINIC | Age: 39
End: 2019-12-03

## 2019-12-03 NOTE — TELEPHONE ENCOUNTER
Deisy Romeo,    Please call patient and let him know I reviewed his BGs    -still having high BG in the morning and after eating meals    Increase lantus to 30 units in the morning and increase humalog to 10 units before each meal    Send me BG log in 1 week for review    Thanks,    aMdina AMES    Endocrinology

## 2019-12-04 DIAGNOSIS — E10.65 TYPE 1 DIABETES MELLITUS WITH HYPERGLYCEMIA (HCC): ICD-10-CM

## 2019-12-04 RX ORDER — INSULIN GLARGINE 100 [IU]/ML
30 INJECTION, SOLUTION SUBCUTANEOUS EVERY MORNING
COMMUNITY
End: 2019-12-16 | Stop reason: SDUPTHER

## 2019-12-04 NOTE — TELEPHONE ENCOUNTER
Left  Message BG log reviewed, please make the following changes:     Increase lantus to 30 units in the morning and increase humalog to 10 units before each meal     Send me BG log in 1 week for review

## 2019-12-12 ENCOUNTER — OFFICE VISIT (OUTPATIENT)
Dept: FAMILY MEDICINE CLINIC | Facility: CLINIC | Age: 39
End: 2019-12-12
Payer: COMMERCIAL

## 2019-12-12 VITALS
DIASTOLIC BLOOD PRESSURE: 86 MMHG | SYSTOLIC BLOOD PRESSURE: 110 MMHG | HEIGHT: 66 IN | BODY MASS INDEX: 21.13 KG/M2 | TEMPERATURE: 98.5 F | WEIGHT: 131.5 LBS

## 2019-12-12 DIAGNOSIS — Z87.891 FORMER SMOKER: ICD-10-CM

## 2019-12-12 DIAGNOSIS — Z23 NEED FOR VACCINATION: ICD-10-CM

## 2019-12-12 DIAGNOSIS — E13.9 LADA (LATENT AUTOIMMUNE DIABETES IN ADULTS), MANAGED AS TYPE 1 (HCC): Primary | ICD-10-CM

## 2019-12-12 DIAGNOSIS — Z11.4 ENCOUNTER FOR SCREENING FOR HIV: ICD-10-CM

## 2019-12-12 PROCEDURE — 99214 OFFICE O/P EST MOD 30 MIN: CPT | Performed by: FAMILY MEDICINE

## 2019-12-12 PROCEDURE — 90471 IMMUNIZATION ADMIN: CPT

## 2019-12-12 PROCEDURE — 1036F TOBACCO NON-USER: CPT | Performed by: FAMILY MEDICINE

## 2019-12-12 PROCEDURE — 3008F BODY MASS INDEX DOCD: CPT | Performed by: FAMILY MEDICINE

## 2019-12-12 PROCEDURE — 90746 HEPB VACCINE 3 DOSE ADULT IM: CPT

## 2019-12-12 NOTE — PROGRESS NOTES
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 RTC 4 months, after that, can do yearly visits    SUBJECTIVE:  Amber Almanza is a 46 y o  male who presents today with a chief complaint of Follow-up  mAber Almanza is here for a 6 month follow-up, he did not have labs done prior to this visit  The active chronic medical problems and medications are as below:   1  Diabetes- late onset, recently changed to basal bolus and his BG have been slowly improving  30uqam and 10TIDAC  Feels dizzy when his BG is in the low 100s  Needs Hep B vaccine today  2  Tobacco use- has quit smoking, almost has been 1 year  He still gets cravings    Review of Systems   Constitutional: Negative for unexpected weight change  Respiratory: Negative for chest tightness and shortness of breath  Cardiovascular: Negative for chest pain  Endocrine: Negative for polydipsia, polyphagia and polyuria  Neurological: Negative for dizziness and light-headedness  I have reviewed the patient's Past Medical History      Current Outpatient Medications:     INS SYRINGE/NEEDLE 1CC/28G (B-D INS SYR MICROFINE 1CC/28G) 28G X 1/2" 1 ML MISC, by Does not apply route 4 (four) times a day, Disp: 100 each, Rfl: 0    insulin glargine (LANTUS) 100 units/mL subcutaneous injection, Inject 30 Units under the skin every morning, Disp: , Rfl:     insulin lispro (HumaLOG) 100 units/mL injection, Inject 10 Units under the skin 3 (three) times a day before meals, Disp: , Rfl:     insulin NPH-insulin regular (NovoLIN 70/30) 100 units/mL subcutaneous injection, Inject SQ 25u with dinner, 28 units with breakfast, Disp: 10 mL, Rfl: 0    Insulin Pen Needle (BD PEN NEEDLE EARLINE U/F) 32G X 4 MM MISC, Use to administer insulin with insulin pen, Disp: 100 each, Rfl: 2    OBJECTIVE:  /86 (BP Location: Left arm, Patient Position: Sitting, Cuff Size: Standard)   Temp 98 5 °F (36 9 °C)   Ht 5' 6" (1 676 m)   Wt 59 6 kg (131 lb 8 oz)   BMI 21 22 kg/m²    Physical Exam   Constitutional: He is oriented to person, place, and time  He appears well-developed and well-nourished  No distress  Cardiovascular: Normal rate, regular rhythm and normal heart sounds  No murmur heard  Pulmonary/Chest: Effort normal and breath sounds normal  No respiratory distress  He has no wheezes  He has no rales  Neurological: He is alert and oriented to person, place, and time  Skin: He is not diaphoretic  Psychiatric: He has a normal mood and affect  Vitals reviewed  BMI Counseling: Body mass index is 21 22 kg/m²  The BMI is above normal  Nutrition recommendations include moderation in carbohydrate intake  I have spent 25 minutes with Patient today in which greater than 50% of this time was spent in counseling/coordination of care regarding Risks and benefits of tx options, Instructions for management, Patient and family education, Importance of treatment compliance and Impressions  Lexii Koroma MD    Note: Portions of the record have been created with voice recognition software  Occasional wrong word or "sound a like" substitutions may have occurred due to the inherent limitations of voice recognition software  Read the chart carefully and recognize, using context, where substitutions have occurred

## 2019-12-12 NOTE — ASSESSMENT & PLAN NOTE
Lab Results   Component Value Date    HGBA1C 14 8 (H) 10/25/2019   Established with Endocrinology  Recently switched to basal/bolus Lantus 30uQHS and Humalog 10u TID   Hep B #2 given today, RTC in 4 months for #3 and HM visit at that time  Needs to establish with ophtho

## 2019-12-13 ENCOUNTER — TELEPHONE (OUTPATIENT)
Dept: ENDOCRINOLOGY | Facility: CLINIC | Age: 39
End: 2019-12-13

## 2019-12-13 NOTE — TELEPHONE ENCOUNTER
Earnstine Clause,    Please let patient know I reviewed his BGs    -having some morning high BGs, postprandial BGs are okay, BGs slowly getting closer to goal     Increase lantus to 33 units in AM and increase humalog to 11 units before meals    Bring BG log to next appt in 2 weeks, have fasting 8AM labs done a few days before appointment    Shikha AMES    Endocrinology

## 2019-12-16 DIAGNOSIS — E13.9 LATENT AUTOIMMUNE DIABETES IN ADULTS (LADA), MANAGED AS TYPE 1 (HCC): ICD-10-CM

## 2019-12-16 DIAGNOSIS — E10.65 TYPE 1 DIABETES MELLITUS WITH HYPERGLYCEMIA (HCC): Primary | ICD-10-CM

## 2019-12-16 RX ORDER — INSULIN GLARGINE 100 [IU]/ML
INJECTION, SOLUTION SUBCUTANEOUS
Qty: 30 ML | Refills: 3 | Status: SHIPPED | OUTPATIENT
Start: 2019-12-16 | End: 2020-05-01

## 2019-12-23 ENCOUNTER — APPOINTMENT (OUTPATIENT)
Dept: LAB | Facility: CLINIC | Age: 39
End: 2019-12-23
Payer: COMMERCIAL

## 2019-12-23 DIAGNOSIS — Z11.4 ENCOUNTER FOR SCREENING FOR HIV: ICD-10-CM

## 2019-12-23 DIAGNOSIS — E10.65 TYPE 1 DIABETES MELLITUS WITH HYPERGLYCEMIA (HCC): ICD-10-CM

## 2019-12-23 LAB
ALBUMIN SERPL BCP-MCNC: 3.8 G/DL (ref 3.5–5)
ALP SERPL-CCNC: 62 U/L (ref 46–116)
ALT SERPL W P-5'-P-CCNC: 22 U/L (ref 12–78)
ANION GAP SERPL CALCULATED.3IONS-SCNC: 5 MMOL/L (ref 4–13)
AST SERPL W P-5'-P-CCNC: 13 U/L (ref 5–45)
BILIRUB SERPL-MCNC: 0.55 MG/DL (ref 0.2–1)
BUN SERPL-MCNC: 10 MG/DL (ref 5–25)
CALCIUM SERPL-MCNC: 9.5 MG/DL (ref 8.3–10.1)
CHLORIDE SERPL-SCNC: 108 MMOL/L (ref 100–108)
CHOLEST SERPL-MCNC: 170 MG/DL (ref 50–200)
CO2 SERPL-SCNC: 28 MMOL/L (ref 21–32)
CREAT SERPL-MCNC: 0.54 MG/DL (ref 0.6–1.3)
GFR SERPL CREATININE-BSD FRML MDRD: 133 ML/MIN/1.73SQ M
GLUCOSE SERPL-MCNC: 81 MG/DL (ref 65–140)
HDLC SERPL-MCNC: 84 MG/DL
LDLC SERPL CALC-MCNC: 71 MG/DL (ref 0–100)
NONHDLC SERPL-MCNC: 86 MG/DL
POTASSIUM SERPL-SCNC: 4.1 MMOL/L (ref 3.5–5.3)
PROT SERPL-MCNC: 7.4 G/DL (ref 6.4–8.2)
SODIUM SERPL-SCNC: 141 MMOL/L (ref 136–145)
TRIGL SERPL-MCNC: 77 MG/DL

## 2019-12-23 PROCEDURE — 87389 HIV-1 AG W/HIV-1&-2 AB AG IA: CPT

## 2019-12-23 PROCEDURE — 80061 LIPID PANEL: CPT

## 2019-12-23 PROCEDURE — 84681 ASSAY OF C-PEPTIDE: CPT

## 2019-12-23 PROCEDURE — 36415 COLL VENOUS BLD VENIPUNCTURE: CPT

## 2019-12-23 PROCEDURE — 80053 COMPREHEN METABOLIC PANEL: CPT

## 2019-12-24 LAB — C PEPTIDE SERPL-MCNC: 0.3 NG/ML (ref 1.1–4.4)

## 2019-12-25 LAB — HIV 1+2 AB+HIV1 P24 AG SERPL QL IA: NORMAL

## 2019-12-29 ENCOUNTER — APPOINTMENT (EMERGENCY)
Dept: CT IMAGING | Facility: HOSPITAL | Age: 39
End: 2019-12-29
Payer: COMMERCIAL

## 2019-12-29 ENCOUNTER — OFFICE VISIT (OUTPATIENT)
Dept: URGENT CARE | Age: 39
End: 2019-12-29
Payer: COMMERCIAL

## 2019-12-29 ENCOUNTER — HOSPITAL ENCOUNTER (EMERGENCY)
Facility: HOSPITAL | Age: 39
Discharge: HOME | End: 2019-12-29
Attending: EMERGENCY MEDICINE | Admitting: EMERGENCY MEDICINE
Payer: COMMERCIAL

## 2019-12-29 VITALS
DIASTOLIC BLOOD PRESSURE: 67 MMHG | BODY MASS INDEX: 21.46 KG/M2 | TEMPERATURE: 98.4 F | RESPIRATION RATE: 18 BRPM | WEIGHT: 132.94 LBS | HEART RATE: 118 BPM | OXYGEN SATURATION: 100 % | SYSTOLIC BLOOD PRESSURE: 121 MMHG

## 2019-12-29 VITALS
DIASTOLIC BLOOD PRESSURE: 64 MMHG | HEART RATE: 124 BPM | TEMPERATURE: 97.5 F | WEIGHT: 133 LBS | SYSTOLIC BLOOD PRESSURE: 118 MMHG | RESPIRATION RATE: 18 BRPM | OXYGEN SATURATION: 99 % | BODY MASS INDEX: 21.38 KG/M2 | HEIGHT: 66 IN

## 2019-12-29 DIAGNOSIS — R10.12 LUQ PAIN: Primary | ICD-10-CM

## 2019-12-29 DIAGNOSIS — S39.011A ABDOMINAL WALL STRAIN, INITIAL ENCOUNTER: Primary | ICD-10-CM

## 2019-12-29 DIAGNOSIS — R33.9 URINARY RETENTION: ICD-10-CM

## 2019-12-29 DIAGNOSIS — K59.00 CONSTIPATION: ICD-10-CM

## 2019-12-29 LAB
ALBUMIN SERPL BCP-MCNC: 3.3 G/DL (ref 3.5–5)
ALP SERPL-CCNC: 56 U/L (ref 46–116)
ALT SERPL W P-5'-P-CCNC: 19 U/L (ref 12–78)
ANION GAP SERPL CALCULATED.3IONS-SCNC: 7 MMOL/L (ref 4–13)
AST SERPL W P-5'-P-CCNC: 13 U/L (ref 5–45)
BACTERIA UR QL AUTO: ABNORMAL /HPF
BASOPHILS # BLD AUTO: 0.03 THOUSANDS/ΜL (ref 0–0.1)
BASOPHILS NFR BLD AUTO: 1 % (ref 0–1)
BILIRUB SERPL-MCNC: 0.37 MG/DL (ref 0.2–1)
BILIRUB UR QL STRIP: NEGATIVE
BUN SERPL-MCNC: 9 MG/DL (ref 5–25)
CALCIUM SERPL-MCNC: 9.1 MG/DL (ref 8.3–10.1)
CHLORIDE SERPL-SCNC: 106 MMOL/L (ref 100–108)
CLARITY UR: CLEAR
CO2 SERPL-SCNC: 30 MMOL/L (ref 21–32)
COLOR UR: YELLOW
CREAT SERPL-MCNC: 0.5 MG/DL (ref 0.6–1.3)
EOSINOPHIL # BLD AUTO: 0.08 THOUSAND/ΜL (ref 0–0.61)
EOSINOPHIL NFR BLD AUTO: 1 % (ref 0–6)
ERYTHROCYTE [DISTWIDTH] IN BLOOD BY AUTOMATED COUNT: 12 % (ref 11.6–15.1)
GFR SERPL CREATININE-BSD FRML MDRD: 137 ML/MIN/1.73SQ M
GLUCOSE SERPL-MCNC: 76 MG/DL (ref 65–140)
GLUCOSE UR STRIP-MCNC: NEGATIVE MG/DL
HCT VFR BLD AUTO: 42.8 % (ref 36.5–49.3)
HGB BLD-MCNC: 14.2 G/DL (ref 12–17)
HGB UR QL STRIP.AUTO: NEGATIVE
IMM GRANULOCYTES # BLD AUTO: 0.02 THOUSAND/UL (ref 0–0.2)
IMM GRANULOCYTES NFR BLD AUTO: 0 % (ref 0–2)
KETONES UR STRIP-MCNC: NEGATIVE MG/DL
LEUKOCYTE ESTERASE UR QL STRIP: NEGATIVE
LIPASE SERPL-CCNC: 49 U/L (ref 73–393)
LYMPHOCYTES # BLD AUTO: 1.47 THOUSANDS/ΜL (ref 0.6–4.47)
LYMPHOCYTES NFR BLD AUTO: 23 % (ref 14–44)
MCH RBC QN AUTO: 30.8 PG (ref 26.8–34.3)
MCHC RBC AUTO-ENTMCNC: 33.2 G/DL (ref 31.4–37.4)
MCV RBC AUTO: 93 FL (ref 82–98)
MONOCYTES # BLD AUTO: 0.66 THOUSAND/ΜL (ref 0.17–1.22)
MONOCYTES NFR BLD AUTO: 10 % (ref 4–12)
NEUTROPHILS # BLD AUTO: 4.21 THOUSANDS/ΜL (ref 1.85–7.62)
NEUTS SEG NFR BLD AUTO: 65 % (ref 43–75)
NITRITE UR QL STRIP: POSITIVE
NON-SQ EPI CELLS URNS QL MICRO: ABNORMAL /HPF
NRBC BLD AUTO-RTO: 0 /100 WBCS
PH UR STRIP.AUTO: 7.5 [PH] (ref 4.5–8)
PLATELET # BLD AUTO: 311 THOUSANDS/UL (ref 149–390)
PMV BLD AUTO: 9.2 FL (ref 8.9–12.7)
POTASSIUM SERPL-SCNC: 4.2 MMOL/L (ref 3.5–5.3)
PROT SERPL-MCNC: 6.7 G/DL (ref 6.4–8.2)
PROT UR STRIP-MCNC: NEGATIVE MG/DL
RBC # BLD AUTO: 4.61 MILLION/UL (ref 3.88–5.62)
RBC #/AREA URNS AUTO: ABNORMAL /HPF
SODIUM SERPL-SCNC: 143 MMOL/L (ref 136–145)
SP GR UR STRIP.AUTO: 1.01 (ref 1–1.03)
UROBILINOGEN UR QL STRIP.AUTO: 0.2 E.U./DL
WBC # BLD AUTO: 6.47 THOUSAND/UL (ref 4.31–10.16)
WBC #/AREA URNS AUTO: ABNORMAL /HPF
WBC CLUMPS # UR AUTO: ABNORMAL /UL

## 2019-12-29 PROCEDURE — 96361 HYDRATE IV INFUSION ADD-ON: CPT

## 2019-12-29 PROCEDURE — 85025 COMPLETE CBC W/AUTO DIFF WBC: CPT | Performed by: PHYSICIAN ASSISTANT

## 2019-12-29 PROCEDURE — 51798 US URINE CAPACITY MEASURE: CPT

## 2019-12-29 PROCEDURE — 99284 EMERGENCY DEPT VISIT MOD MDM: CPT

## 2019-12-29 PROCEDURE — 99213 OFFICE O/P EST LOW 20 MIN: CPT | Performed by: PHYSICIAN ASSISTANT

## 2019-12-29 PROCEDURE — 36415 COLL VENOUS BLD VENIPUNCTURE: CPT | Performed by: PHYSICIAN ASSISTANT

## 2019-12-29 PROCEDURE — 81001 URINALYSIS AUTO W/SCOPE: CPT

## 2019-12-29 PROCEDURE — 74177 CT ABD & PELVIS W/CONTRAST: CPT

## 2019-12-29 PROCEDURE — 80053 COMPREHEN METABOLIC PANEL: CPT | Performed by: PHYSICIAN ASSISTANT

## 2019-12-29 PROCEDURE — 96360 HYDRATION IV INFUSION INIT: CPT

## 2019-12-29 PROCEDURE — 83690 ASSAY OF LIPASE: CPT | Performed by: PHYSICIAN ASSISTANT

## 2019-12-29 PROCEDURE — 99284 EMERGENCY DEPT VISIT MOD MDM: CPT | Performed by: PHYSICIAN ASSISTANT

## 2019-12-29 RX ORDER — SODIUM CHLORIDE 9 MG/ML
125 INJECTION, SOLUTION INTRAVENOUS CONTINUOUS
Status: DISCONTINUED | OUTPATIENT
Start: 2019-12-29 | End: 2019-12-29 | Stop reason: HOSPADM

## 2019-12-29 RX ORDER — NAPROXEN 500 MG/1
500 TABLET ORAL 2 TIMES DAILY WITH MEALS
Qty: 14 TABLET | Refills: 0 | Status: SHIPPED | OUTPATIENT
Start: 2019-12-29 | End: 2020-03-07 | Stop reason: SDUPTHER

## 2019-12-29 RX ORDER — DOCUSATE SODIUM 100 MG/1
100 CAPSULE, LIQUID FILLED ORAL EVERY 12 HOURS
Qty: 60 EACH | Refills: 0 | Status: SHIPPED | OUTPATIENT
Start: 2019-12-29 | End: 2020-05-01

## 2019-12-29 RX ORDER — MAGNESIUM CARB/ALUMINUM HYDROX 105-160MG
148 TABLET,CHEWABLE ORAL ONCE
Status: COMPLETED | OUTPATIENT
Start: 2019-12-29 | End: 2019-12-29

## 2019-12-29 RX ORDER — LIDOCAINE HYDROCHLORIDE 20 MG/ML
1 JELLY TOPICAL ONCE
Status: COMPLETED | OUTPATIENT
Start: 2019-12-29 | End: 2019-12-29

## 2019-12-29 RX ADMIN — SODIUM CHLORIDE 125 ML/HR: 0.9 INJECTION, SOLUTION INTRAVENOUS at 16:20

## 2019-12-29 RX ADMIN — IOHEXOL 100 ML: 350 INJECTION, SOLUTION INTRAVENOUS at 17:13

## 2019-12-29 RX ADMIN — MAGNESIUM CITRATE 148 ML: 1.75 LIQUID ORAL at 18:00

## 2019-12-29 RX ADMIN — LIDOCAINE HYDROCHLORIDE 1 APPLICATION: 20 JELLY TOPICAL at 18:00

## 2019-12-29 NOTE — ED PROVIDER NOTES
History  Chief Complaint   Patient presents with    Abdominal Pain     left upper quadrant abd pain X one week , sent from [facility] for further evaluation       History provided by:  Patient  Abdominal Pain   Pain location:  Epigastric and LUQ  Pain quality: aching    Pain radiates to:  Does not radiate  Pain severity:  Moderate  Onset quality:  Gradual  Duration:  1 week  Timing:  Constant  Progression:  Waxing and waning  Chronicity:  New  Context: alcohol use    Context: not awakening from sleep, not diet changes, not eating, not medication withdrawal, not previous surgeries, not recent illness, not recent sexual activity, not recent travel, not retching, not sick contacts, not suspicious food intake and not trauma    Relieved by:  None tried  Worsened by: Movement and palpation  Ineffective treatments:  None tried  Associated symptoms: anorexia    Associated symptoms: no belching, no chest pain, no chills, no cough, no diarrhea, no dysuria, no fatigue, no fever, no hematuria, no melena, no nausea, no shortness of breath, no sore throat and no vomiting    Risk factors: no alcohol abuse, no aspirin use, not elderly, has not had multiple surgeries, no NSAID use, not obese, not pregnant and no recent hospitalization        Prior to Admission Medications   Prescriptions Last Dose Informant Patient Reported? Taking?    INS SYRINGE/NEEDLE 1CC/28G (B-D INS SYR MICROFINE 1CC/28G) 28G X 1/2" 1 ML MISC   No No   Sig: by Does not apply route 4 (four) times a day   Insulin Pen Needle (BD PEN NEEDLE EARLINE U/F) 32G X 4 MM MISC   No No   Sig: Use to administer insulin with insulin pen   insulin NPH-insulin regular (NovoLIN 70/30) 100 units/mL subcutaneous injection   No No   Sig: Inject SQ 25u with dinner, 28 units with breakfast   Patient not taking: Reported on 12/31/2019   insulin glargine (LANTUS) 100 units/mL subcutaneous injection   No No   Sig: Inject 33 units under the skin every morning   insulin lispro (HumaLOG) 100 units/mL injection   No No   Sig: Inject 11 units under the skin before meals      Facility-Administered Medications: None       Past Medical History:   Diagnosis Date    Allergic     Diabetes mellitus ([facility] [address] ) 10/25/2019       History reviewed  No pertinent surgical history  Family History   Problem Relation Age of Onset    Hypertension Mother     Diabetes Mother     Diabetes Father     Dementia Father     Substance Abuse Brother      I have reviewed and agree with the history as documented  Social History     Tobacco Use    Smoking status: Former Smoker     Packs/day: 0 50     Years: 22 00     Pack years: 11 00     Last attempt to quit: 11/15/2018     Years since quittin 1    Smokeless tobacco: Never Used   Substance Use Topics    Alcohol use: Yes     Frequency: Never     Drinks per session: 1 or 2     Binge frequency: Monthly     Comment: social drinker    Drug use: No        Review of Systems   Constitutional: Positive for activity change and appetite change  Negative for chills, diaphoresis, fatigue and fever  HENT: Negative for congestion, dental problem, ear discharge, ear pain, mouth sores, postnasal drip, rhinorrhea, sore throat and trouble swallowing  Eyes: Negative for pain, discharge, redness and itching  Respiratory: Negative for cough, chest tightness, shortness of breath and wheezing  Cardiovascular: Negative for chest pain and palpitations  Gastrointestinal: Positive for abdominal pain and anorexia  Negative for diarrhea, melena, nausea and vomiting  Genitourinary: Negative for dysuria, frequency, hematuria and urgency  Musculoskeletal: Negative for back pain  Skin: Negative for color change, pallor and rash  Psychiatric/Behavioral: Negative for confusion  All other systems reviewed and are negative  Physical Exam  Physical Exam   Constitutional: He is oriented to person, place, and time  He appears well-developed and well-nourished  Non-toxic appearance  He does not appear ill  No distress  HENT:   Head: Normocephalic and atraumatic  Right Ear: External ear normal    Left Ear: External ear normal    Nose: Nose normal    Eyes: Conjunctivae are normal  Right eye exhibits no discharge  Left eye exhibits no discharge  Neck: Neck supple  No tracheal deviation present  No thyromegaly present  Cardiovascular: Normal rate and regular rhythm  Exam reveals no gallop and no friction rub  No murmur heard  Pulmonary/Chest: Effort normal and breath sounds normal    Abdominal: Soft  There is tenderness  There is no rebound and no guarding  Epigastric and LUQ  Tenderness with light palpation over the left upper abdomen area and lower chest area  Patient feels like he strained a muscle here  It is tender with light palpation  Lymphadenopathy:     He has no cervical adenopathy  Neurological: He is alert and oriented to person, place, and time  Skin: Skin is warm  Capillary refill takes less than 2 seconds  Psychiatric: He has a normal mood and affect  His behavior is normal  Judgment and thought content normal    Nursing note and vitals reviewed        Vital Signs  ED Triage Vitals [12/29/19 1456]   Temperature Pulse Respirations Blood Pressure SpO2   98 4 °F (36 9 °C) (!) 118 18 121/67 100 %      Temp Source Heart Rate Source Patient Position - Orthostatic VS BP Location FiO2 (%)   Oral -- -- Right arm --      Pain Score       8           Vitals:    12/29/19 1456   BP: 121/67   Pulse: (!) 118         Visual Acuity      ED Medications  Medications   iohexol (OMNIPAQUE) 350 MG/ML injection (MULTI-DOSE) 100 mL (100 mL Intravenous Given 12/29/19 1713)   lidocaine (URO-JET, GLYDO) 2 % urethral/mucosal gel 1 application (1 application Urethral Given 12/29/19 1800)   magnesium citrate (CITROMA) oral solution 148 mL (148 mL Oral Given 12/29/19 1800)       Diagnostic Studies  Results Reviewed     Procedure Component Value Units Date/Time    Urine Microscopic [[id number]]  (Abnormal) Collected:  12/29/19 1752    Lab Status:  Final result Specimen:  Urine, Clean Catch Updated:  12/29/19 1905     RBC, UA 0-1 /hpf      WBC, UA 0-5 /hpf      Epithelial Cells None Seen /hpf      Bacteria, UA Occasional /hpf      WBC Clumps rare    Urine Macroscopic, POC [[id number]]  (Abnormal) Collected:  12/29/19 1752    Lab Status:  Final result Specimen:  Urine Updated:  12/29/19 1750     Color, UA Yellow     Clarity, UA Clear     pH, UA 7 5     Leukocytes, UA Negative     Nitrite, UA Positive     Protein, UA Negative mg/dl      Glucose, UA Negative mg/dl      Ketones, UA Negative mg/dl      Urobilinogen, UA 0 2 E U /dl      Bilirubin, UA Negative     Blood, UA Negative     Specific Gravity, UA 1 015    Narrative:       CLINITEK RESULT    Comprehensive metabolic panel [[id number]]  (Abnormal) Collected:  12/29/19 1619    Lab Status:  Final result Specimen:  Blood from Arm, Right Updated:  12/29/19 1654     Sodium 143 mmol/L      Potassium 4 2 mmol/L      Chloride 106 mmol/L      CO2 30 mmol/L      ANION GAP 7 mmol/L      BUN 9 mg/dL      Creatinine 0 50 mg/dL      Glucose 76 mg/dL      Calcium 9 1 mg/dL      AST 13 U/L      ALT 19 U/L      Alkaline Phosphatase 56 U/L      Total Protein 6 7 g/dL      Albumin 3 3 g/dL      Total Bilirubin 0 37 mg/dL      eGFR 137 ml/min/1 73sq m     Narrative:       [place] guidelines for Chronic Kidney Disease (CKD):     Stage 1 with normal or high GFR (GFR > 90 mL/min/1 73 square meters)    Stage 2 Mild CKD (GFR = 60-89 mL/min/1 73 square meters)    Stage 3A Moderate CKD (GFR = 45-59 mL/min/1 73 square meters)    Stage 3B Moderate CKD (GFR = 30-44 mL/min/1 73 square meters)    Stage 4 Severe CKD (GFR = 15-29 mL/min/1 73 square meters)    Stage 5 End Stage CKD (GFR <15 mL/min/1 73 square meters)  Note: GFR calculation is accurate only with a steady state creatinine    Lipase [[id number]]  (Abnormal) Collected:  12/29/19 1619 Lab Status:  Final result Specimen:  Blood from Arm, Right Updated:  12/29/19 1654     Lipase 49 u/L     CBC and differential [[id number]] Collected:  12/29/19 1619    Lab Status:  Final result Specimen:  Blood from Arm, Right Updated:  12/29/19 1625     WBC 6 47 Thousand/uL      RBC 4 61 Million/uL      Hemoglobin 14 2 g/dL      Hematocrit 42 8 %      MCV 93 fL      MCH 30 8 pg      MCHC 33 2 g/dL      RDW 12 0 %      MPV 9 2 fL      Platelets 413 Thousands/uL      nRBC 0 /100 WBCs      Neutrophils Relative 65 %      Immat GRANS % 0 %      Lymphocytes Relative 23 %      Monocytes Relative 10 %      Eosinophils Relative 1 %      Basophils Relative 1 %      Neutrophils Absolute 4 21 Thousands/µL      Immature Grans Absolute 0 02 Thousand/uL      Lymphocytes Absolute 1 47 Thousands/µL      Monocytes Absolute 0 66 Thousand/µL      Eosinophils Absolute 0 08 Thousand/µL      Basophils Absolute 0 03 Thousands/µL                  CT abdomen pelvis with contrast   ED Interpretation by Loco Sarmiento PA-C (12/29 1732)   No acute abnormalities  Final Result by Claudean Holes, MD (12/29 1726)      No acute findings            Workstation performed: [id number]                    Procedures  Procedures         ED Course  ED Course as of Dec 31 2044   Sun Dec 29, 2019   1730 CT scan demonstrates a lot of stool within the colon and the rectum  His bladder was quite distended  Patient is actively urinating  Will bladder scan to check for decompression  1742 Post void residual-500 ml      1751 Straight cath -bladder decompressed  1753 Urine dip was reviewed  It is nitrate positive  There is no leukocytes extension  Patient is asymptomatic  Will check urine culture results and treat accordingly                                    MDM  Number of Diagnoses or Management Options  Abdominal wall strain, initial encounter: new and requires workup  Constipation: new and requires workup  Urinary retention: new and requires workup     Amount and/or Complexity of Data Reviewed  Clinical lab tests: ordered and reviewed  Tests in the radiology section of CPT®: ordered and reviewed  Tests in the medicine section of CPT®: ordered and reviewed    Risk of Complications, Morbidity, and/or Mortality  Presenting problems: high  Diagnostic procedures: high  Management options: high  General comments: Patient presents emergency room with complaints of left upper quadrant and epigastric tenderness  He was seen and evaluated  Laboratory studies were taken and were reviewed  A CT scan of the abdomen and pelvis demonstrated a distended bladder  The patient was sent for postvoid residual   His residual urine was over 700  He was straight cathed and decompressed  He was discharged home and was instructed to follow up with his family physician  He had no signs of back pain or cauda equina type symptoms  He did have a large amount of stool in his rectal vault as well  He was given a dose of citrate of magnesium prior to discharge  He was discharged home with a prescription for Colace to take twice a day  If he has any further problems with his urinary retention, he will return to the emergency room for an indwelling Uribe and a urology consult      Patient Progress  Patient progress: stable        Disposition  Final diagnoses:   Abdominal wall strain, initial encounter   Constipation   Urinary retention     Time reflects when diagnosis was documented in both MDM as applicable and the Disposition within this note     Time User Action Codes Description Comment    12/29/2019  5:34 PM Bernie Jones [Q69 953R] Abdominal wall strain, initial encounter     12/29/2019  5:34 PM Bernie Prado Add [K59 00] Constipation     12/29/2019  5:46 PM Bernie Prado Add [R33 9] Urinary retention       ED Disposition     ED Disposition Condition Date/Time Comment    Discharge Stable Sun Dec 29, 2019  5:33 PM [facility] discharge to home/self care  Follow-up Information    None         Discharge Medication List as of 12/29/2019  5:47 PM      START taking these medications    Details   docusate sodium (COLACE) 100 mg capsule Take 1 capsule (100 mg total) by mouth every 12 (twelve) hours, Starting Sun 12/29/2019, Normal      naproxen (NAPROSYN) 500 mg tablet Take 1 tablet (500 mg total) by mouth 2 (two) times a day with meals for 14 doses As needed for pain, Starting Sun 12/29/2019, Until Sun 1/5/2020, Normal         CONTINUE these medications which have NOT CHANGED    Details   INS SYRINGE/NEEDLE 1CC/28G (B-D INS SYR MICROFINE 1CC/28G) 28G X 1/2" 1 ML MISC by Does not apply route 4 (four) times a day, Starting Sun 10/27/2019, Print      insulin glargine (LANTUS) 100 units/mL subcutaneous injection Inject 33 units under the skin every morning, Normal      insulin lispro (HumaLOG) 100 units/mL injection Inject 11 units under the skin before meals, Normal      insulin NPH-insulin regular (NovoLIN 70/30) 100 units/mL subcutaneous injection Inject SQ 25u with dinner, 28 units with breakfast, Print      Insulin Pen Needle (BD PEN NEEDLE EARLINE U/F) 32G X 4 MM MISC Use to administer insulin with insulin pen, Normal           No discharge procedures on file      ED Provider  Electronically Signed by           Cole Fuchs PA-C  12/31/19 2045

## 2019-12-29 NOTE — PROGRESS NOTES
[address]        NAME: Joshua Bach is a 46 y o  male  : 1980    MRN: [id number]  DATE: 2019  TIME: 2:00 PM    Assessment and Plan   LUQ pain [R10 12]  1  LUQ pain  Transfer to other facility     Patient has acute left upper quadrant tenderness, guarding with palpation  Pt describes pain as moderate-severe    Pt newly dx SIA diabetic managed as type 1  Started treatment 2 months ago  Pt sent to ED for further evaluation including labs  Patient Instructions       Go immediately to the [address]    Chief Complaint     Chief Complaint   Patient presents with    Rib Pain     pt reports left rib pain X 1 week   Kaleb Stephanie Kaleb Stephanie pt feels he injured himself while straining to void since insulin therapy ( newly diagnosed diabetic in October)         History of Present Illness       Patient reports one-week history of left "Diaphragm" pain  Patient states that the pain is and his left ribcage area, underneath the left ribs and in his left upper quadrant of his abdomen  Patient states that the pain has been waxing waning but has never resolved  Patient states the 1st time he noticed that, he was straining while trying to urinate  Patient states the pain is significantly worsening today  Patient denies any injury or trauma to the ribs or the abdomen  Patient states that occasionally he has mild guarding with taking deep breaths due to pain  No nausea vomiting  No weakness or fatigue  Patient was recently diagnosed with SIA diabetes and started treatment for this in 2019  Patient has no chest pain  No palpitations  No dizziness  No fatigue  Review of Systems   Review of Systems   Constitutional: Negative  Negative for chills, diaphoresis, fatigue and fever  HENT: Negative  Eyes: Negative  Respiratory: Negative  Negative for chest tightness, shortness of breath and wheezing  Cardiovascular: Negative  Negative for chest pain and palpitations  Gastrointestinal: Positive for abdominal pain  Negative for diarrhea, nausea and vomiting  Endocrine: Negative  Genitourinary: Negative  Negative for dysuria  Musculoskeletal: Negative for back pain and neck pain  Skin: Negative  Negative for color change, rash and wound  Neurological: Negative for dizziness, weakness, light-headedness, numbness and headaches  Hematological: Negative  Psychiatric/Behavioral: Negative  Current Medications       Current Outpatient Medications:     INS SYRINGE/NEEDLE 1CC/28G (B-D INS SYR MICROFINE 1CC/28G) 28G X 1/2" 1 ML MISC, by Does not apply route 4 (four) times a day, Disp: 100 each, Rfl: 0    insulin glargine (LANTUS) 100 units/mL subcutaneous injection, Inject 33 units under the skin every morning, Disp: 30 mL, Rfl: 3    insulin lispro (HumaLOG) 100 units/mL injection, Inject 11 units under the skin before meals, Disp: 30 mL, Rfl: 3    insulin NPH-insulin regular (NovoLIN 70/30) 100 units/mL subcutaneous injection, Inject SQ 25u with dinner, 28 units with breakfast, Disp: 10 mL, Rfl: 0    Insulin Pen Needle (BD PEN NEEDLE EARLINE U/F) 32G X 4 MM MISC, Use to administer insulin with insulin pen, Disp: 100 each, Rfl: 2    Current Allergies     Allergies as of 12/29/2019 - Reviewed 12/29/2019   Allergen Reaction Noted    Other Allergic Rhinitis 01/25/2019            The following portions of the patient's history were reviewed and updated as appropriate: allergies, current medications, past family history, past medical history, past social history, past surgical history and problem list      Past Medical History:   Diagnosis Date    Allergic     Diabetes mellitus ([facility] [address] ) 10/25/2019       History reviewed  No pertinent surgical history      Family History   Problem Relation Age of Onset    Hypertension Mother     Diabetes Mother     Diabetes Father     Dementia Father     Substance Abuse Brother          Medications have been verified  Objective   /64 (BP Location: Left arm, Patient Position: Sitting, Cuff Size: Standard)   Pulse (!) 124   Temp 97 5 °F (36 4 °C) (Temporal)   Resp 18   Ht 5' 6" (1 676 m)   Wt 60 3 kg (133 lb)   SpO2 99%   BMI 21 47 kg/m²        Physical Exam     Physical Exam   Constitutional: He appears well-developed and well-nourished  He appears distressed (Pt sitting on exam table holding LUQ  Appears in pain  )  HENT:   Head: Normocephalic and atraumatic  Nose: Nose normal    Mouth/Throat: Oropharynx is clear and moist  No oropharyngeal exudate  Cardiovascular: Normal rate, regular rhythm, normal heart sounds and intact distal pulses  Pulmonary/Chest: Effort normal and breath sounds normal  No respiratory distress  He has no wheezes  He has no rales  Abdominal: Soft  Bowel sounds are normal  He exhibits no distension and no mass  There is tenderness in the left upper quadrant  There is guarding  There is no rigidity, no rebound and no CVA tenderness  Skin: Skin is warm  Capillary refill takes less than 2 seconds  No rash noted  He is not diaphoretic  Nursing note and vitals reviewed

## 2019-12-31 ENCOUNTER — OFFICE VISIT (OUTPATIENT)
Dept: ENDOCRINOLOGY | Facility: CLINIC | Age: 39
End: 2019-12-31
Payer: COMMERCIAL

## 2019-12-31 ENCOUNTER — TELEPHONE (OUTPATIENT)
Dept: ENDOCRINOLOGY | Facility: CLINIC | Age: 39
End: 2019-12-31

## 2019-12-31 VITALS
BODY MASS INDEX: 21.86 KG/M2 | DIASTOLIC BLOOD PRESSURE: 78 MMHG | SYSTOLIC BLOOD PRESSURE: 110 MMHG | WEIGHT: 136 LBS | HEIGHT: 66 IN | HEART RATE: 116 BPM

## 2019-12-31 DIAGNOSIS — E10.65 TYPE 1 DIABETES MELLITUS WITH HYPERGLYCEMIA (HCC): Primary | ICD-10-CM

## 2019-12-31 DIAGNOSIS — E16.2 HYPOGLYCEMIA: ICD-10-CM

## 2019-12-31 DIAGNOSIS — E13.9 LATENT AUTOIMMUNE DIABETES IN ADULTS (LADA), MANAGED AS TYPE 1 (HCC): Primary | ICD-10-CM

## 2019-12-31 LAB
SL AMB POCT GLUCOSE BLD: 27
SL AMB POCT GLUCOSE BLD: 81

## 2019-12-31 PROCEDURE — 82948 REAGENT STRIP/BLOOD GLUCOSE: CPT | Performed by: INTERNAL MEDICINE

## 2019-12-31 PROCEDURE — 99214 OFFICE O/P EST MOD 30 MIN: CPT | Performed by: INTERNAL MEDICINE

## 2019-12-31 RX ORDER — FLASH GLUCOSE SCANNING READER
EACH MISCELLANEOUS
Qty: 1 DEVICE | Refills: 0 | Status: SHIPPED | OUTPATIENT
Start: 2019-12-31 | End: 2022-02-18

## 2019-12-31 RX ORDER — FLASH GLUCOSE SENSOR
KIT MISCELLANEOUS
Qty: 2 EACH | Refills: 12 | Status: SHIPPED | OUTPATIENT
Start: 2019-12-31 | End: 2020-07-13 | Stop reason: SDUPTHER

## 2019-12-31 NOTE — PATIENT INSTRUCTIONS
Reduce lantus to 30 units in the morning and reduce humalog to 9 units before each meal     Have labs done in 2 months    Follow up in 1 month

## 2019-12-31 NOTE — TELEPHONE ENCOUNTER
Sent email to [employer] Rep Rachidyvette Ortiz   requesting to contact patient and provide information for Dexcom

## 2019-12-31 NOTE — PROGRESS NOTES
ENDOCRINOLOGY  FOLLOW UP VISIT      Reason for Endocrine Consult/Chief Complaint: diabetes management    ? Medical Decision Making:     Impression  1  SIA        Recommendations:     I discussed the pathophysiology of SIA and reviewed therapy options based on ADA 2019 guidelines      He had a severe hypoglycemic event with BG to 27 at appt today  I extensively counseled him on how to treat low BGs and explained the symptoms of hypoglycemia  He was treated with 40 grams of apple juice with recovery of his BG to 81  He also was given 5 glucose tablets and crackers to take before driving from the parking lot today  Instructed to always check BG before driving  I prescribed glucose tablets and a home glucagon kit for the patient  He understood how to treat hypoglycemia after counseling  I decreased his lantus dose to 30 units qHS and reduced humalog to 9 units TID AC  I prescribed the FreeStyle Taj CGM in order for him to start checking more frequently and monitor for further hypoglycemia  I also gave him the information for [facility] THE CGM given he needs an accurate CGM in the future- he will contact the company representative to run his insurance information for this device  This patient requires therapeutic CGM  The patient has diabetes and has been using a home glucose monitor and is performing frequent (2 or more times a day) home glucose monitor testing and is presently being treated with multi-dose insulin therapy (MDI) with 3 or more injections a day or with a continuous subcutaneous insulin infusion (CSII) pump  This patient's insulin treatment regimen requires frequent adjustments on the basis of therapeutic CGM testing results        Return to clinic in 4 weeks     A total of 25 minutes were spent face-to-face with the patient during this encounter and over half the time was spent on counseling and coordination of care   We discussed in depth the pathophysiology, treatment goals, treatment options of SIA, and extensive counseling on hypoglycemia and treating hypoglycemia    Ally AMES            History of Present Illness:   Mr Pascal is a 29-year-old male who presents for diabetes management  He was hospitalized in October 2019 with elevated blood sugars  He was seen by the Endocrinology service and autoantibodies were checked  His IA -2 antibody was positive  His admission blood sugar was 686  His beta hydroxybutyrate was mildly elevated at 1 1   ?  PMH-SIA  PSH-none known  FHx-father with diabetes type 2  SHx-quit smoking 1 year ago, social ETOH, work 2 jobs [employer]      Type of DM:  New onset  Age of onset:  October 2019  Most recent A1C:  14 8% October 2019  Present home regimen:  Novolin 70/30 28 units in the morning and 25 with dinner   SMBG at home: 200s before breakfast and dinner   Hypoglycemic events: none   Microvascular complications: none known   Macrovascular complications: none known   Nutrition: three meals, roasted chickpea brocolli breakfast, bean chili lunch, dinner veggie burger chips, vegan   Exercise: byron campos studying      Events since last visit:    Lantus 33 units taken in the morning and Humalog 11 units    FBG-80s-mid 100s  Premeal/qHS BG-mid 100s  Hypoglycemia-today at appt    He was sweating quite a bit at the appointment, checked POC BG which was low at 27, gave him 2 20gram apple juices, repeat POC after 15 minutes increased to 81 with symptomatic improvement, this was his first significant hypoglycemic event, ate bagel and cream cheese with breakfast        ?  Review of Systems:     Review of Systems   Constitutional: Negative for appetite change, chills, diaphoresis, fatigue, fever and unexpected weight change  HENT: Negative for congestion, ear pain, hearing loss, rhinorrhea, sinus pressure, sinus pain, sore throat, trouble swallowing and voice change  Eyes: Negative for photophobia, redness and visual disturbance     Respiratory: Negative for apnea, cough, chest tightness, shortness of breath, wheezing and stridor  Cardiovascular: Negative for chest pain, palpitations and leg swelling  Gastrointestinal: Negative for abdominal distention, abdominal pain, constipation, diarrhea, nausea and vomiting  Endocrine: Negative for cold intolerance, heat intolerance, polydipsia, polyphagia and polyuria  Genitourinary: Negative for difficulty urinating, dysuria, flank pain, frequency, hematuria and urgency  Musculoskeletal: Negative for arthralgias, back pain, gait problem, joint swelling and myalgias  Skin: Negative for color change, pallor, rash and wound  Allergic/Immunologic: Negative for immunocompromised state  Neurological: Negative for dizziness, tremors, syncope, weakness, light-headedness and headaches  Hematological: Negative for adenopathy  Does not bruise/bleed easily  Psychiatric/Behavioral: Negative for confusion and sleep disturbance  The patient is not nervous/anxious  ? Patient History:     Past Medical History:   Diagnosis Date    Allergic     [facility]     History reviewed  No pertinent surgical history    Social History     Socioeconomic History    Marital status: /Civil Union     Spouse name: Not on file    Number of children: Not on file    Years of education: Not on file    Highest education level: Not on file   Occupational History    Not on file   Social Needs    Financial resource strain: Not on file    Food insecurity:     Worry: Not on file     Inability: Not on file    Transportation needs:     Medical: Not on file     Non-medical: Not on file   Tobacco Use    Smoking status: Former Smoker     Packs/day: 0 50     Years: 22 00     Pack years: 11 00     Last attempt to quit: 11/15/2018     Years since quittin 1    Smokeless tobacco: Never Used   Substance and Sexual Activity    Alcohol use: Yes     Frequency: Never     Drinks per session: 1 or 2     Binge frequency: Monthly     Comment: social drinker    Drug use: No    Sexual activity: Yes     Partners: Female     Birth control/protection: Condom Male   Lifestyle    Physical activity:     Days per week: Not on file     Minutes per session: Not on file    Stress: Not on file   Relationships    Social connections:     Talks on phone: Not on file     Gets together: Not on file     Attends [employer] service: Not on file     Active member of club or organization: Not on file     Attends meetings of clubs or organizations: Not on file     Relationship status: Not on file    Intimate partner violence:     Fear of current or ex partner: Not on file     Emotionally abused: Not on file     Physically abused: Not on file     Forced sexual activity: Not on file   Other Topics Concern    Not on file   Social History Narrative    Not on file     Family History   Problem Relation Age of Onset    Hypertension Mother     Diabetes Mother     Diabetes Father     Dementia Father     Substance Abuse Brother        Current Medications: At the time this note was written these were the medications the patient was on    Current Outpatient Medications   Medication Sig Dispense Refill    docusate sodium (COLACE) 100 mg capsule Take 1 capsule (100 mg total) by mouth every 12 (twelve) hours 60 each 0    INS SYRINGE/NEEDLE 1CC/28G (B-D INS SYR MICROFINE 1CC/28G) 28G X 1/2" 1 ML MISC by Does not apply route 4 (four) times a day 100 each 0    insulin glargine (LANTUS) 100 units/mL subcutaneous injection Inject 33 units under the skin every morning 30 mL 3    insulin lispro (HumaLOG) 100 units/mL injection Inject 11 units under the skin before meals 30 mL 3    Insulin Pen Needle (BD PEN NEEDLE EARLINE U/F) 32G X 4 MM MISC Use to administer insulin with insulin pen 100 each 2    naproxen (NAPROSYN) 500 mg tablet Take 1 tablet (500 mg total) by mouth 2 (two) times a day with meals for 14 doses As needed for pain 14 tablet 0    insulin NPH-insulin regular (NovoLIN 70/30) 100 units/mL subcutaneous injection Inject SQ 25u with dinner, 28 units with breakfast (Patient not taking: Reported on 12/31/2019) 10 mL 0     No current facility-administered medications for this visit  Allergies: Other    Physical Exam:   Vital Signs:   /78   Pulse (!) 116   Ht 5' 6" (1 676 m)   Wt 61 7 kg (136 lb)   BMI 21 95 kg/m²     Physical Exam   Constitutional: He is oriented to person, place, and time  He appears well-developed and well-nourished  HENT:   Head: Normocephalic and atraumatic  Nose: Nose normal    Mouth/Throat: Oropharynx is clear and moist  No oropharyngeal exudate  Eyes: Pupils are equal, round, and reactive to light  Conjunctivae and EOM are normal  No scleral icterus  Neck: Normal range of motion  Neck supple  No thyromegaly present  Cardiovascular: Normal rate, regular rhythm and normal heart sounds  Exam reveals no gallop and no friction rub  No murmur heard  Pulmonary/Chest: Effort normal and breath sounds normal  No stridor  No respiratory distress  He has no wheezes  He has no rales  Abdominal: Soft  Bowel sounds are normal  He exhibits no distension and no mass  There is no tenderness  There is no rebound and no guarding  Musculoskeletal: Normal range of motion  He exhibits no edema  Lymphadenopathy:     He has no cervical adenopathy  Neurological: He is alert and oriented to person, place, and time  Skin: Skin is warm  No rash noted  No erythema  No pallor  +significant sweating    Psychiatric: He has a normal mood and affect   His behavior is normal  Judgment and thought content normal         Labs and Imaging:      Component      Latest Ref Rng & Units 12/23/2019   Sodium      136 - 145 mmol/L 141   Potassium      3 5 - 5 3 mmol/L 4 1   Chloride      100 - 108 mmol/L 108   CO2      21 - 32 mmol/L 28   Anion Gap      4 - 13 mmol/L 5   BUN      5 - 25 mg/dL 10   Creatinine      0 60 - 1 30 mg/dL 0 54 (L)   Glucose, Random      65 - 140 mg/dL 81   Calcium      8 3 - 10 1 mg/dL 9 5   AST      5 - 45 U/L 13   ALT      12 - 78 U/L 22   Alkaline Phosphatase      46 - 116 U/L 62   Total Protein      6 4 - 8 2 g/dL 7 4   Albumin      3 5 - 5 0 g/dL 3 8   TOTAL BILIRUBIN      0 20 - 1 00 mg/dL 0 55   eGFR      ml/min/1 73sq m 133   C-PEPTIDE      1 1 - 4 4 ng/mL 0 3 (L)

## 2020-01-02 ENCOUNTER — TELEPHONE (OUTPATIENT)
Dept: OTHER | Facility: HOSPITAL | Age: 40
End: 2020-01-02

## 2020-01-02 NOTE — TELEPHONE ENCOUNTER
Patient said he had one low one 50 something  He felt it coming on and drank soda  I told him to let us know any sugars under 70

## 2020-01-02 NOTE — TELEPHONE ENCOUNTER
Rodri Fall,    Can you check with patient to make sure he has not had any further low BGs, he was the patient who had a very severe low BG in clinic on Tuesday to 27  Thanks,    Felisa AMES    Endocrinology

## 2020-01-03 NOTE — TELEPHONE ENCOUNTER
I called pt, no further low BGs today  Getting FreeStyle Taj CGM today  He will call us if he has any further low BGs <70  Quinn AMES    Endocrinology

## 2020-01-31 ENCOUNTER — OFFICE VISIT (OUTPATIENT)
Dept: ENDOCRINOLOGY | Facility: CLINIC | Age: 40
End: 2020-01-31
Payer: COMMERCIAL

## 2020-01-31 VITALS
HEIGHT: 66 IN | WEIGHT: 136 LBS | BODY MASS INDEX: 21.86 KG/M2 | SYSTOLIC BLOOD PRESSURE: 118 MMHG | DIASTOLIC BLOOD PRESSURE: 78 MMHG | HEART RATE: 125 BPM

## 2020-01-31 DIAGNOSIS — R39.11 URINARY HESITANCY: ICD-10-CM

## 2020-01-31 DIAGNOSIS — E13.9 LATENT AUTOIMMUNE DIABETES IN ADULTS (LADA), MANAGED AS TYPE 1 (HCC): ICD-10-CM

## 2020-01-31 DIAGNOSIS — K59.00 CONSTIPATION, UNSPECIFIED CONSTIPATION TYPE: ICD-10-CM

## 2020-01-31 DIAGNOSIS — E10.65 TYPE 1 DIABETES MELLITUS WITH HYPERGLYCEMIA (HCC): Primary | ICD-10-CM

## 2020-01-31 PROCEDURE — 99214 OFFICE O/P EST MOD 30 MIN: CPT | Performed by: INTERNAL MEDICINE

## 2020-01-31 RX ORDER — DEXTROSE 4 G
TABLET,CHEWABLE ORAL
COMMUNITY
Start: 2019-12-31 | End: 2020-02-10 | Stop reason: SDUPTHER

## 2020-01-31 NOTE — PATIENT INSTRUCTIONS
Increase lantus to 33 units in the morning- see if the [place] long acting insulin is covered, if it is start that one at 33 units in the morning    Decrease humalog to 5 units in the morning with breakfast and 5 units with lunch and continue 10 units with dinner    See Jovita Child certified diabetes educator to discuss carb counting, and see the different types of insulin pumps     Follow up 4 weeks

## 2020-01-31 NOTE — PROGRESS NOTES
ENDOCRINOLOGY  FOLLOW UP VISIT      Reason for Endocrine Consult/Chief Complaint: DM management     ? Medical Decision Making:     Impression  1  SIA  2  Urinary hesitancy  3  Constipation         Recommendations:       He has started to use the FreeStyle Taj CGM  He is having low BGs after breakfast and after lunch due to low carb meals at that time and low BG occasionally after dinner if low carb  I reduced humalog to 5 units with breakfast and lunch to limit post-prandial hypoglycemia after those meals  Continue humalog 10 units with dinner for now  I also switched his basal insulin to [place] to diminish risk of hypoglycemia, will increase dose to 33 units in the morning to improve fasting BGs  I spoke with him about considering insulin pump which will allow more fine tuning of doses  He has never carb counted before  I referred him to [employer] to discuss carb counting and see the types of pumps available        This patient requires therapeutic CGM  The patient has diabetes and has been using a home glucose monitor and is performing frequent (2 or more times a day) home glucose monitor testing and is presently being treated with multi-dose insulin therapy (MDI) with 3 or more injections a day or with a continuous subcutaneous insulin infusion (CSII) pump  This patient's insulin treatment regimen requires frequent adjustments on the basis of therapeutic CGM testing results  Urinary hesitancy- new onset since hospital stay, referred to urology    Constipation- new onset since hospital stay ,referred to GI     Possible autonomic neuropathy leading to these symptoms?      Return to clinic in 4 weeks     A total of 25 minutes were spent face-to-face with the patient during this encounter and over half the time was spent on counseling and coordination of care   We discussed in depth the pathophysiology, treatment goals, treatment options of SIA, and extensive counseling on hypoglycemia and treating hypoglycemia, reviewed insulin pump options and flexible insulin dosing    Meghna AMES            History of Present Illness:   Mr Sue Malhotra is a 45-year-old male who presents for diabetes management  [facility] was hospitalized in October 2019 with elevated blood sugars   He was seen by the Endocrinology service and autoantibodies were checked   His IA -2 antibody was positive   His admission blood sugar was 686  His beta hydroxybutyrate was mildly elevated at 1 1   ?  PMH-SIA  PSH-none known  FHx-father with diabetes type 2  SHx-quit smoking 1 year ago, social ETOH, work 2 jobs [employer]      Type of DM:  New onset  Age of onset:  October 2019  Microvascular complications: none known   Macrovascular complications: none known        Events since last visit:    on lantus 30 units qHS and humalog 10 units TID AC        FBG-low to mid to high 100s, occasional 200s  Premeal/qHS BGs-low to mid to high 100s, occasional 200s  Hypoglycemia-4% on vargas report for 1 month- having after breakfast, after lunch mainly low BGs, occasionally after dinner    He does not have very many carbs with breakfast or lunch, most carbs at dinner-time     Having urinary hesitancy, some nocturia- started after hospitalization for diabetes  Having constipation requiring laxatives since hospitalization           ? Review of Systems:     Review of Systems   Constitutional: Negative for appetite change, chills, diaphoresis, fatigue, fever and unexpected weight change  HENT: Negative for congestion, ear pain, hearing loss, rhinorrhea, sinus pressure, sinus pain, sore throat, trouble swallowing and voice change  Eyes: Negative for photophobia, redness and visual disturbance  Respiratory: Negative for apnea, cough, chest tightness, shortness of breath, wheezing and stridor  Cardiovascular: Negative for chest pain, palpitations and leg swelling     Gastrointestinal: +constipation   Endocrine: Negative for cold intolerance, heat intolerance, polydipsia, polyphagia and polyuria  Genitourinary: +urinary hesitancy   Musculoskeletal: Negative for arthralgias, back pain, gait problem, joint swelling and myalgias  Skin: Negative for color change, pallor, rash and wound  Allergic/Immunologic: Negative for immunocompromised state  Neurological: Negative for dizziness, tremors, syncope, weakness, light-headedness and headaches  Hematological: Negative for adenopathy  Does not bruise/bleed easily  Psychiatric/Behavioral: Negative for confusion and sleep disturbance  The patient is not nervous/anxious  ? Patient History:     Past Medical History:   Diagnosis Date    Allergic     [facility]     History reviewed  No pertinent surgical history    Social History     Socioeconomic History    Marital status: /Civil Union     Spouse name: Not on file    Number of children: Not on file    Years of education: Not on file    Highest education level: Not on file   Occupational History    Not on file   Social Needs    Financial resource strain: Not on file    Food insecurity:     Worry: Not on file     Inability: Not on file    Transportation needs:     Medical: Not on file     Non-medical: Not on file   Tobacco Use    Smoking status: Former Smoker     Packs/day: 0 50     Years: 22 00     Pack years: 11 00     Last attempt to quit: 11/15/2018     Years since quittin 2    Smokeless tobacco: Never Used   Substance and Sexual Activity    Alcohol use: Yes     Frequency: Never     Drinks per session: 1 or 2     Binge frequency: Monthly     Comment: social drinker    Drug use: No    Sexual activity: Yes     Partners: Female     Birth control/protection: Condom Male   Lifestyle    Physical activity:     Days per week: Not on file     Minutes per session: Not on file    Stress: Not on file   Relationships    Social connections:     Talks on phone: Not on file     Gets together: Not on file     Attends Hoah[employer] service: Not on file     Active member of club or organization: Not on file     Attends meetings of clubs or organizations: Not on file     Relationship status: Not on file    Intimate partner violence:     Fear of current or ex partner: Not on file     Emotionally abused: Not on file     Physically abused: Not on file     Forced sexual activity: Not on file   Other Topics Concern    Not on file   Social History Narrative    Not on file     Family History   Problem Relation Age of Onset    Hypertension Mother     Diabetes Mother     Diabetes Father     Dementia Father     Substance Abuse Brother        Current Medications: At the time this note was written these were the medications the patient was on    Current Outpatient Medications   Medication Sig Dispense Refill    Continuous Blood Gluc  (FREESTYLE RUTH 14 DAY READER) ANNABEL Use 1 reader device to check blood sugar 1 Device 0    Continuous Blood Gluc Sensor (FREESTYLE RUTH 14 DAY SENSOR) MISC Use 1 sensor every 14 days to check blood sugar 2 each 12    [facility] GLUCOSE 4-6 GM-MG       glucagon (GLUCAGON EMERGENCY) 1 MG injection Inject 1 mg under the skin once as needed for low blood sugar for up to 1 dose 1 kit 0    glucose 4 g chewable tablet Chew 4 tablets (16 g total) as needed for low blood sugar 100 tablet 3    INS SYRINGE/NEEDLE 1CC/28G (B-D INS SYR MICROFINE 1CC/28G) 28G X 1/2" 1 ML MISC by Does not apply route 4 (four) times a day 100 each 0    insulin glargine (LANTUS) 100 units/mL subcutaneous injection Inject 33 units under the skin every morning (Patient taking differently: 30 Units Inject 33 units under the skin every morning) 30 mL 3    insulin lispro (HumaLOG) 100 units/mL injection Inject 11 units under the skin before meals (Patient taking differently: 10 Units Inject 11 units under the skin before meals) 30 mL 3    Insulin Pen Needle (BD PEN NEEDLE EARLINE U/F) 32G X 4 MM MISC Use to administer insulin with insulin pen 100 each 2    docusate sodium (COLACE) 100 mg capsule Take 1 capsule (100 mg total) by mouth every 12 (twelve) hours (Patient not taking: Reported on 1/31/2020) 60 each 0    insulin NPH-insulin regular (NovoLIN 70/30) 100 units/mL subcutaneous injection Inject SQ 25u with dinner, 28 units with breakfast (Patient not taking: Reported on 12/31/2019) 10 mL 0    naproxen (NAPROSYN) 500 mg tablet Take 1 tablet (500 mg total) by mouth 2 (two) times a day with meals for 14 doses As needed for pain 14 tablet 0     No current facility-administered medications for this visit  Allergies: Other    Physical Exam:   Vital Signs:   /78   Pulse (!) 125   Ht 5' 6" (1 676 m)   Wt 61 7 kg (136 lb)   BMI 21 95 kg/m²     Physical Exam   Constitutional: He is oriented to person, place, and time  He appears well-developed and well-nourished  HENT:   Head: Normocephalic and atraumatic  Nose: Nose normal    Mouth/Throat: Oropharynx is clear and moist  No oropharyngeal exudate  Eyes: Pupils are equal, round, and reactive to light  Conjunctivae and EOM are normal  No scleral icterus  Neck: Normal range of motion  Neck supple  No thyromegaly present  Cardiovascular: Normal rate, regular rhythm and normal heart sounds  Exam reveals no gallop and no friction rub  No murmur heard  Pulmonary/Chest: Effort normal and breath sounds normal  No stridor  No respiratory distress  He has no wheezes  He has no rales  Abdominal: Soft  Bowel sounds are normal  He exhibits no distension and no mass  There is no tenderness  There is no rebound and no guarding  Musculoskeletal: Normal range of motion  He exhibits no edema  Lymphadenopathy:     He has no cervical adenopathy  Neurological: He is alert and oriented to person, place, and time  Skin: Skin is warm and dry  No rash noted  No erythema  No pallor  Psychiatric: He has a normal mood and affect   His behavior is normal  Judgment and thought content normal  Labs and Imaging:    No recent labs

## 2020-02-10 ENCOUNTER — OFFICE VISIT (OUTPATIENT)
Dept: UROLOGY | Facility: MEDICAL CENTER | Age: 40
End: 2020-02-10
Payer: COMMERCIAL

## 2020-02-10 VITALS
HEIGHT: 66 IN | DIASTOLIC BLOOD PRESSURE: 76 MMHG | SYSTOLIC BLOOD PRESSURE: 112 MMHG | WEIGHT: 136 LBS | HEART RATE: 140 BPM | BODY MASS INDEX: 21.86 KG/M2

## 2020-02-10 DIAGNOSIS — E10.65 TYPE 1 DIABETES MELLITUS WITH HYPERGLYCEMIA (HCC): ICD-10-CM

## 2020-02-10 DIAGNOSIS — R33.9 URINARY RETENTION WITH INCOMPLETE BLADDER EMPTYING: Primary | ICD-10-CM

## 2020-02-10 DIAGNOSIS — R39.11 URINARY HESITANCY: ICD-10-CM

## 2020-02-10 LAB
POST-VOID RESIDUAL VOLUME, ML POC: 217 ML
SL AMB  POCT GLUCOSE, UA: ABNORMAL
SL AMB LEUKOCYTE ESTERASE,UA: ABNORMAL
SL AMB POCT BILIRUBIN,UA: ABNORMAL
SL AMB POCT BLOOD,UA: ABNORMAL
SL AMB POCT CLARITY,UA: CLEAR
SL AMB POCT COLOR,UA: YELLOW
SL AMB POCT KETONES,UA: ABNORMAL
SL AMB POCT NITRITE,UA: ABNORMAL
SL AMB POCT PH,UA: 7
SL AMB POCT SPECIFIC GRAVITY,UA: 1.02
SL AMB POCT URINE PROTEIN: ABNORMAL
SL AMB POCT UROBILINOGEN: 1

## 2020-02-10 PROCEDURE — 99244 OFF/OP CNSLTJ NEW/EST MOD 40: CPT | Performed by: UROLOGY

## 2020-02-10 PROCEDURE — 51798 US URINE CAPACITY MEASURE: CPT | Performed by: UROLOGY

## 2020-02-10 PROCEDURE — 81003 URINALYSIS AUTO W/O SCOPE: CPT | Performed by: UROLOGY

## 2020-02-10 NOTE — PROGRESS NOTES
HISTORY:    Complains of urinary hesitancy, slow flow, straining to void, bearing down  Nocturia times 1-4  No hematuria infections stones  Evaluation at urgent care last month, with subsequent ER visit, led to his straight cath for 500 mL  CT scan several months ago shows moderately distended bladder, and PVR today 217 mL peer     Diagnosed with type 1 diabetes several months ago, the symptoms predated that by several months  ASSESSMENT / PLAN:    Possibly hypotonic  diabetic neurogenic bladder is contributing cause here  Cysto to evaluate  The following portions of the patient's history were reviewed and updated as appropriate: allergies, current medications, past family history, past medical history, past social history, past surgical history and problem list     Review of Systems   All other systems reviewed and are negative  Objective:     Physical Exam   Constitutional: He appears well-developed and well-nourished  Genitourinary:   Genitourinary Comments: Penis testes normal         No results found for: PSA]  BUN   Date Value Ref Range Status   12/29/2019 9 5 - 25 mg/dL Final     Creatinine   Date Value Ref Range Status   12/29/2019 0 50 (L) 0 60 - 1 30 mg/dL Final     Comment:     Standardized to IDMS reference method     No components found for: CBC      Patient Active Problem List   Diagnosis    Former smoker    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), [facility]    Urinary hesitancy    Urinary retention with incomplete bladder emptying        Diagnoses and all orders for this visit:    Urinary retention with incomplete bladder emptying    Urinary hesitancy  -     Ambulatory referral to Urology  -     POCT urine dip auto non-scope  -     POCT Measure PVR           Patient ID: Ernestine Cosme is a 46 y o  male        Current Outpatient Medications:     Continuous Blood Gluc  (FREESTYLE RUTH 14 DAY READER) ANNABEL, Use 1 reader device to check blood sugar, Disp: 1 Device, Rfl: 0    Continuous Blood Gluc Sensor (FREESTYLE RUTH 14 DAY SENSOR) MISC, Use 1 sensor every 14 days to check blood sugar, Disp: 2 each, Rfl: 12    glucagon (GLUCAGON EMERGENCY) 1 MG injection, Inject 1 mg under the skin once as needed for low blood sugar for up to 1 dose, Disp: 1 kit, Rfl: 0    glucose 4 g chewable tablet, Chew 4 tablets (16 g total) as needed for low blood sugar, Disp: 100 tablet, Rfl: 3    INS SYRINGE/NEEDLE 1CC/28G (B-D INS SYR MICROFINE 1CC/28G) 28G X 1/2" 1 ML MISC, by Does not apply route 4 (four) times a day, Disp: 100 each, Rfl: 0    insulin degludec (TRESIBA FLEXTOUCH) 100 units/mL injection pen, Inject 33 Units under the skin daily, Disp: 5 pen, Rfl: 3    insulin glargine (LANTUS) 100 units/mL subcutaneous injection, Inject 33 units under the skin every morning (Patient taking differently: 30 Units Inject 33 units under the skin every morning), Disp: 30 mL, Rfl: 3    insulin lispro (HumaLOG) 100 units/mL injection, Inject 11 units under the skin before meals (Patient taking differently: 10 Units Inject 11 units under the skin before meals), Disp: 30 mL, Rfl: 3    docusate sodium (COLACE) 100 mg capsule, Take 1 capsule (100 mg total) by mouth every 12 (twelve) hours (Patient not taking: Reported on 1/31/2020), Disp: 60 each, Rfl: 0    Insulin Pen Needle (BD PEN NEEDLE EARLINE U/F) 32G X 4 MM MISC, Use 4 pen needles a day to administer insulin under the skin, Disp: 100 each, Rfl: 2    naproxen (NAPROSYN) 500 mg tablet, Take 1 tablet (500 mg total) by mouth 2 (two) times a day with meals for 14 doses As needed for pain, Disp: 14 tablet, Rfl: 0    Past Medical History:   Diagnosis Date    Allergic     Diabetes mellitus ([facility] ) 10/25/2019       History reviewed  No pertinent surgical history      Social History

## 2020-02-11 DIAGNOSIS — E11.9 DIABETES MELLITUS (HCC): ICD-10-CM

## 2020-02-11 RX ORDER — SYRINGE AND NEEDLE,INSULIN,1ML
SYRINGE, EMPTY DISPOSABLE MISCELLANEOUS
Qty: 100 EACH | Refills: 3 | Status: SHIPPED | OUTPATIENT
Start: 2020-02-11

## 2020-02-14 ENCOUNTER — OFFICE VISIT (OUTPATIENT)
Dept: GASTROENTEROLOGY | Facility: AMBULARY SURGERY CENTER | Age: 40
End: 2020-02-14
Payer: COMMERCIAL

## 2020-02-14 VITALS
DIASTOLIC BLOOD PRESSURE: 84 MMHG | BODY MASS INDEX: 21.53 KG/M2 | TEMPERATURE: 98.2 F | WEIGHT: 134 LBS | RESPIRATION RATE: 18 BRPM | HEIGHT: 66 IN | HEART RATE: 118 BPM | SYSTOLIC BLOOD PRESSURE: 122 MMHG

## 2020-02-14 DIAGNOSIS — K59.00 CONSTIPATION, UNSPECIFIED CONSTIPATION TYPE: Primary | ICD-10-CM

## 2020-02-14 PROCEDURE — 99244 OFF/OP CNSLTJ NEW/EST MOD 40: CPT | Performed by: INTERNAL MEDICINE

## 2020-02-14 NOTE — ASSESSMENT & PLAN NOTE
Appear to be functional secondary to irritable bowel syndrome  Rule out colorectal lesions including polyps or malignancy which seems to be less likely    -will put him on Linzess 290 micro g daily    -Schedule for colonoscopy  -High-fiber diet and MiraLax     -advised to drink plenty of liquids    -Patient was given instructions about the colonoscopy prep     -Patient was explained about  the risks and benefits of the procedure  Risks including but not limited to bleeding, infection, perforation were explained in detail  Also explained about less than 100% sensitivity with the exam and other alternatives

## 2020-02-14 NOTE — PROGRESS NOTES
Consultation - [facility]  Patito Cheryl 1980 male         Chief Complaint:  Constipation    HPI:  29-year-old male with history of recently diagnosed diabetes mellitus reports having issues with his bowels since October when he was diagnosed with diabetes  Complaining about severe constipation requiring laxatives  He also went to the ER and had a CT scan done which was reported to be negative  Denies any blood or mucus in the stool  Good appetite, no recent weight loss  Complaining about soreness in the abdomen at times  Denies any heartburn acid reflux  Denies any difficulty swallowing  REVIEW OF SYSTEMS: Review of Systems   Constitutional: Negative for activity change, appetite change, chills, diaphoresis, fatigue, fever and unexpected weight change  HENT: Negative for ear discharge, ear pain, facial swelling, hearing loss, nosebleeds, sore throat, tinnitus and voice change  Eyes: Negative for pain, discharge, redness, itching and visual disturbance  Respiratory: Negative for apnea, cough, chest tightness, shortness of breath and wheezing  Cardiovascular: Negative for chest pain and palpitations  Gastrointestinal:        As noted in HPI   Endocrine: Negative for cold intolerance, heat intolerance and polyuria  Genitourinary: Positive for difficulty urinating (with hesitancy)  Negative for dysuria, flank pain, hematuria and urgency  Musculoskeletal: Negative for arthralgias, back pain, gait problem, joint swelling and myalgias  Skin: Negative for rash and wound  Neurological: Negative for dizziness, tremors, seizures, speech difficulty, light-headedness, numbness and headaches  Hematological: Negative for adenopathy  Does not bruise/bleed easily  Psychiatric/Behavioral: Negative for agitation, behavioral problems and confusion  The patient is not nervous/anxious           Past Medical History:   Diagnosis Date    Allergic     Diabetes mellitus ([facility] ) 10/25/2019      History reviewed  No pertinent surgical history    Social History     Socioeconomic History    Marital status: /Civil Union     Spouse name: Not on file    Number of children: Not on file    Years of education: Not on file    Highest education level: Not on file   Occupational History    Not on file   Social Needs    Financial resource strain: Not on file    Food insecurity:     Worry: Not on file     Inability: Not on file    Transportation needs:     Medical: Not on file     Non-medical: Not on file   Tobacco Use    Smoking status: Former Smoker     Packs/day: 0 50     Years: 22 00     Pack years: 11 00     Last attempt to quit: 11/15/2018     Years since quittin 2    Smokeless tobacco: Never Used   Substance and Sexual Activity    Alcohol use: Yes     Frequency: Never     Drinks per session: 1 or 2     Binge frequency: Monthly     Comment: social drinker    Drug use: No    Sexual activity: Yes     Partners: Female     Birth control/protection: Condom Male   Lifestyle    Physical activity:     Days per week: Not on file     Minutes per session: Not on file    Stress: Not on file   Relationships    Social connections:     Talks on phone: Not on file     Gets together: Not on file     Attends [employer] service: Not on file     Active member of club or organization: Not on file     Attends meetings of clubs or organizations: Not on file     Relationship status: Not on file    Intimate partner violence:     Fear of current or ex partner: Not on file     Emotionally abused: Not on file     Physically abused: Not on file     Forced sexual activity: Not on file   Other Topics Concern    Not on file   Social History Narrative    Not on file     Family History   Problem Relation Age of Onset    Hypertension Mother     Diabetes Mother     Diabetes Father     Dementia Father     Substance Abuse Brother      Other  Current Outpatient Medications   Medication Sig Dispense Refill    Continuous Blood Gluc  (FREESTYLE RUTH 14 DAY READER) ANNABEL Use 1 reader device to check blood sugar 1 Device 0    Continuous Blood Gluc Sensor (FREESTYLE RUTH 14 DAY SENSOR) MISC Use 1 sensor every 14 days to check blood sugar 2 each 12    glucagon (GLUCAGON EMERGENCY) 1 MG injection Inject 1 mg under the skin once as needed for low blood sugar for up to 1 dose 1 kit 0    glucose 4 g chewable tablet Chew 4 tablets (16 g total) as needed for low blood sugar 100 tablet 3    INS SYRINGE/NEEDLE 1CC/28G (B-D INS SYR MICROFINE 1CC/28G) 28G X 1/2" 1 ML MISC Use to inject insulin daily 100 each 3    insulin glargine (LANTUS) 100 units/mL subcutaneous injection Inject 33 units under the skin every morning (Patient taking differently: 30 Units Inject 33 units under the skin every morning) 30 mL 3    insulin lispro (HumaLOG) 100 units/mL injection Inject 11 units under the skin before meals (Patient taking differently: 10 Units Inject 11 units under the skin before meals) 30 mL 3    Insulin Pen Needle (BD PEN NEEDLE EARLINE U/F) 32G X 4 MM MISC Use 4 pen needles a day to administer insulin under the skin 100 each 2    bisacodyl (DULCOLAX) 5 mg EC tablet Take 2 tablets (10 mg total) by mouth once for 1 dose 2 tablet 0    docusate sodium (COLACE) 100 mg capsule Take 1 capsule (100 mg total) by mouth every 12 (twelve) hours (Patient not taking: Reported on 1/31/2020) 60 each 0    insulin degludec (TRESIBA FLEXTOUCH) 100 units/mL injection pen Inject 33 Units under the skin daily 5 pen 3    linaCLOtide (Linzess) 290 MCG CAPS Take 1 capsule by mouth daily 30 capsule 5    naproxen (NAPROSYN) 500 mg tablet Take 1 tablet (500 mg total) by mouth 2 (two) times a day with meals for 14 doses As needed for pain (Patient not taking: Reported on 2/14/2020) 14 tablet 0    polyethylene glycol (GOLYTELY) 4000 mL solution Take 4,000 mL by mouth once for 1 dose 4000 mL 0     No current facility-administered medications for this visit  Blood pressure 122/84, pulse (!) 118, temperature 98 2 °F (36 8 °C), temperature source Tympanic, resp  rate 18, height 5' 6" (1 676 m), weight 60 8 kg (134 lb)  PHYSICAL EXAM: Physical Exam     Lab Results   Component Value Date    WBC 6 47 12/29/2019    HGB 14 2 12/29/2019    HCT 42 8 12/29/2019    MCV 93 12/29/2019     12/29/2019     Lab Results   Component Value Date    GLUCOSE >600 (HH) 10/25/2019    GLUCOSE >600 (HH) 10/25/2019    CALCIUM 9 1 12/29/2019    K 4 2 12/29/2019    CO2 30 12/29/2019     12/29/2019    BUN 9 12/29/2019    CREATININE 0 50 (L) 12/29/2019     Lab Results   Component Value Date    ALT 19 12/29/2019    AST 13 12/29/2019    ALKPHOS 56 12/29/2019     No results found for: INR, PROTIME    Ct Abdomen Pelvis With Contrast    Result Date: 12/29/2019  Impression: No acute findings Workstation performed: [id number]       ASSESSMENT & PLAN:    Constipation  Appear to be functional secondary to irritable bowel syndrome  Rule out colorectal lesions including polyps or malignancy which seems to be less likely    -will put him on Linzess 290 micro g daily    -Schedule for colonoscopy  -High-fiber diet and MiraLax     -advised to drink plenty of liquids    -Patient was given instructions about the colonoscopy prep     -Patient was explained about  the risks and benefits of the procedure  Risks including but not limited to bleeding, infection, perforation were explained in detail  Also explained about less than 100% sensitivity with the exam and other alternatives

## 2020-02-19 ENCOUNTER — TELEPHONE (OUTPATIENT)
Dept: ENDOCRINOLOGY | Facility: CLINIC | Age: 40
End: 2020-02-19

## 2020-02-19 NOTE — TELEPHONE ENCOUNTER
Spoke with patient and reminded him of his up coming appointment and to have his blood work completed

## 2020-02-20 ENCOUNTER — APPOINTMENT (OUTPATIENT)
Dept: LAB | Facility: CLINIC | Age: 40
End: 2020-02-20
Payer: COMMERCIAL

## 2020-02-20 DIAGNOSIS — E13.9 LATENT AUTOIMMUNE DIABETES IN ADULTS (LADA), MANAGED AS TYPE 1 (HCC): ICD-10-CM

## 2020-02-20 DIAGNOSIS — E10.65 TYPE 1 DIABETES MELLITUS WITH HYPERGLYCEMIA (HCC): ICD-10-CM

## 2020-02-20 LAB
ALBUMIN SERPL BCP-MCNC: 3.8 G/DL (ref 3.5–5)
ALP SERPL-CCNC: 60 U/L (ref 46–116)
ALT SERPL W P-5'-P-CCNC: 22 U/L (ref 12–78)
ANION GAP SERPL CALCULATED.3IONS-SCNC: 8 MMOL/L (ref 4–13)
AST SERPL W P-5'-P-CCNC: 10 U/L (ref 5–45)
BILIRUB SERPL-MCNC: 0.67 MG/DL (ref 0.2–1)
BUN SERPL-MCNC: 9 MG/DL (ref 5–25)
CALCIUM SERPL-MCNC: 9.3 MG/DL (ref 8.3–10.1)
CHLORIDE SERPL-SCNC: 106 MMOL/L (ref 100–108)
CHOLEST SERPL-MCNC: 150 MG/DL (ref 50–200)
CO2 SERPL-SCNC: 25 MMOL/L (ref 21–32)
CREAT SERPL-MCNC: 0.59 MG/DL (ref 0.6–1.3)
CREAT UR-MCNC: 74.3 MG/DL
EST. AVERAGE GLUCOSE BLD GHB EST-MCNC: 186 MG/DL
GFR SERPL CREATININE-BSD FRML MDRD: 128 ML/MIN/1.73SQ M
GLUCOSE SERPL-MCNC: 135 MG/DL (ref 65–140)
HBA1C MFR BLD: 8.1 %
HDLC SERPL-MCNC: 80 MG/DL
LDLC SERPL CALC-MCNC: 58 MG/DL (ref 0–100)
MICROALBUMIN UR-MCNC: 8.3 MG/L (ref 0–20)
MICROALBUMIN/CREAT 24H UR: 11 MG/G CREATININE (ref 0–30)
NONHDLC SERPL-MCNC: 70 MG/DL
POTASSIUM SERPL-SCNC: 4 MMOL/L (ref 3.5–5.3)
PROT SERPL-MCNC: 7.4 G/DL (ref 6.4–8.2)
SODIUM SERPL-SCNC: 139 MMOL/L (ref 136–145)
TRIGL SERPL-MCNC: 59 MG/DL

## 2020-02-20 PROCEDURE — 36415 COLL VENOUS BLD VENIPUNCTURE: CPT

## 2020-02-20 PROCEDURE — 80061 LIPID PANEL: CPT

## 2020-02-20 PROCEDURE — 82043 UR ALBUMIN QUANTITATIVE: CPT

## 2020-02-20 PROCEDURE — 3061F NEG MICROALBUMINURIA REV: CPT | Performed by: INTERNAL MEDICINE

## 2020-02-20 PROCEDURE — 80053 COMPREHEN METABOLIC PANEL: CPT

## 2020-02-20 PROCEDURE — 82570 ASSAY OF URINE CREATININE: CPT

## 2020-02-20 PROCEDURE — 83036 HEMOGLOBIN GLYCOSYLATED A1C: CPT

## 2020-02-24 ENCOUNTER — OFFICE VISIT (OUTPATIENT)
Dept: ENDOCRINOLOGY | Facility: CLINIC | Age: 40
End: 2020-02-24
Payer: COMMERCIAL

## 2020-02-24 VITALS
HEIGHT: 66 IN | SYSTOLIC BLOOD PRESSURE: 140 MMHG | DIASTOLIC BLOOD PRESSURE: 60 MMHG | WEIGHT: 133 LBS | HEART RATE: 115 BPM | BODY MASS INDEX: 21.38 KG/M2

## 2020-02-24 DIAGNOSIS — E10.65 TYPE 1 DIABETES MELLITUS WITH HYPERGLYCEMIA (HCC): Primary | ICD-10-CM

## 2020-02-24 DIAGNOSIS — K59.00 CONSTIPATION, UNSPECIFIED CONSTIPATION TYPE: ICD-10-CM

## 2020-02-24 DIAGNOSIS — E13.9 LATENT AUTOIMMUNE DIABETES IN ADULTS (LADA), MANAGED AS TYPE 1 (HCC): ICD-10-CM

## 2020-02-24 DIAGNOSIS — R39.11 URINARY HESITANCY: ICD-10-CM

## 2020-02-24 PROCEDURE — 1036F TOBACCO NON-USER: CPT | Performed by: INTERNAL MEDICINE

## 2020-02-24 PROCEDURE — 95251 CONT GLUC MNTR ANALYSIS I&R: CPT | Performed by: INTERNAL MEDICINE

## 2020-02-24 PROCEDURE — 99214 OFFICE O/P EST MOD 30 MIN: CPT | Performed by: INTERNAL MEDICINE

## 2020-02-24 PROCEDURE — 3052F HG A1C>EQUAL 8.0%<EQUAL 9.0%: CPT | Performed by: INTERNAL MEDICINE

## 2020-02-24 PROCEDURE — 3008F BODY MASS INDEX DOCD: CPT | Performed by: INTERNAL MEDICINE

## 2020-02-24 RX ORDER — POLYETHYLENE GLYCOL-3350 AND ELECTROLYTES 236; 6.74; 5.86; 2.97; 22.74 G/274.31G; G/274.31G; G/274.31G; G/274.31G; G/274.31G
POWDER, FOR SOLUTION ORAL
COMMUNITY
Start: 2020-02-14

## 2020-02-24 RX ORDER — FACIAL-BODY WIPES
EACH TOPICAL
COMMUNITY
Start: 2020-02-14

## 2020-02-24 RX ORDER — INSULIN LISPRO 100 [IU]/ML
INJECTION, SOLUTION INTRAVENOUS; SUBCUTANEOUS
Qty: 5 PEN | Refills: 3 | Status: SHIPPED | OUTPATIENT
Start: 2020-02-24

## 2020-02-24 NOTE — PROGRESS NOTES
ENDOCRINOLOGY  FOLLOW UP VISIT      Reason for Endocrine Consult/Chief Complaint: DM management   ? Medical Decision Making:     Impression  1  SIA  2  Urinary hesitancy  3  Constipation         Recommendations:        He has some good days with BGs at goal and other days with hyperglycemia related to carb/fat intake and a few overnight low BG days as well  For now continue Tresiba 33 units in AM and humalog 7-5-5  Instructed to reduce mealtime insulin in 1/2 if eating low carb meal        This patient requires therapeutic CGM  The patient has diabetes and has been using a home glucose monitor and is performing frequent (2 or more times a day) home glucose monitor testing and is presently being treated with multi-dose insulin therapy (MDI) with 3 or more injections a day or with a continuous subcutaneous insulin infusion (CSII) pump  This patient's insulin treatment regimen requires frequent adjustments on the basis of therapeutic CGM testing results       Urinary hesitancy- new onset since hospital stay, seen by urology pending cystoscopy      Constipation- new onset since hospital stay , seen by GI pending colonoscopy       Possible autonomic neuropathy leading to these symptoms?      Return to clinic in 2 months      A total of 25 minutes were spent face-to-face with the patient during this encounter and over half the time was spent on counseling and coordination of care  We discussed in depth the pathophysiology, treatment goals, treatment options of SIA, and extensive counseling on hypoglycemia and treating hypoglycemia, reviewed insulin pump options and flexible insulin dosing  Freestyle Taj CGM interpretation 2/24/20     CGM worn 86% of time Feb 11th-Feb 24th 2020    Time in range 42% (goal >65-70%)  Hypoglycemia 8%  CV 46 5% (goal <33%)    Average glucose 190 mg/dL  GMI 7 9%    He has some days with fairly well controlled BGs and other days with overnight low BGs and post-prandial hyperglycemia or post-prandial hypoglycemia  I reduced his prandial insulin with low carb meals and encouraged a lower carb/fat diet during the day to stabilize BGs  If having repeated overnight low BGs will reduce basal dose but this is occurring sporadically right now     Maria Antonia AMES            History of Present Illness:   Mr Zan Brand is a 45-year-old male who presents for diabetes management  Nisa Brand was hospitalized in October 2019 with elevated blood sugars   He was seen by the Endocrinology service and autoantibodies were checked   His IA -2 antibody was positive   His admission blood sugar was 686  His beta hydroxybutyrate was mildly elevated at 1 1   ?  PMH-SIA  PSH-none known  FHx-father with diabetes type 2  SHx-quit smoking 1 year ago, social ETOH, work 2 jobs [employer]      Type of DM:  New onset  Age of onset:  October 2019  Microvascular complications: none known   Macrovascular complications: none known           Events since last visit:     On tresiba 33 units qHS and humalog 7-5-5 units with breakfast/lunch/dinner    See Daric  ? Review of Systems:     Review of Systems   Constitutional: Negative for appetite change, chills, diaphoresis, fatigue, fever and unexpected weight change  HENT: Negative for congestion, ear pain, hearing loss, rhinorrhea, sinus pressure, sinus pain, sore throat, trouble swallowing and voice change  Eyes: Negative for photophobia, redness and visual disturbance  Respiratory: Negative for apnea, cough, chest tightness, shortness of breath, wheezing and stridor  Cardiovascular: Negative for chest pain, palpitations and leg swelling  Gastrointestinal: +constipation  Endocrine: Negative for cold intolerance, heat intolerance, polydipsia, polyphagia and polyuria  Genitourinary: +hesitancy   Musculoskeletal: Negative for arthralgias, back pain, gait problem, joint swelling and myalgias  Skin: Negative for color change, pallor, rash and wound  Allergic/Immunologic: Negative for immunocompromised state  Neurological: Negative for dizziness, tremors, syncope, weakness, light-headedness and headaches  Hematological: Negative for adenopathy  Does not bruise/bleed easily  Psychiatric/Behavioral: Negative for confusion and sleep disturbance  The patient is not nervous/anxious  ? Patient History:     Past Medical History:   Diagnosis Date    Allergic     Diabetes mellitus ([facility] ) 10/25/2019     History reviewed  No pertinent surgical history    Social History     Socioeconomic History    Marital status: /Civil Union     Spouse name: Not on file    Number of children: Not on file    Years of education: Not on file    Highest education level: Not on file   Occupational History    Not on file   Social Needs    Financial resource strain: Not on file    Food insecurity:     Worry: Not on file     Inability: Not on file    Transportation needs:     Medical: Not on file     Non-medical: Not on file   Tobacco Use    Smoking status: Former Smoker     Packs/day: 0 50     Years: 22 00     Pack years: 11 00     Last attempt to quit: 11/15/2018     Years since quittin 2    Smokeless tobacco: Never Used   Substance and Sexual Activity    Alcohol use: Yes     Frequency: Never     Drinks per session: 1 or 2     Binge frequency: Monthly     Comment: social drinker    Drug use: No    Sexual activity: Yes     Partners: Female     Birth control/protection: Condom Male   Lifestyle    Physical activity:     Days per week: Not on file     Minutes per session: Not on file    Stress: Not on file   Relationships    Social connections:     Talks on phone: Not on file     Gets together: Not on file     Attends Nondenominational service: Not on file     Active member of club or organization: Not on file     Attends meetings of clubs or organizations: Not on file     Relationship status: Not on file    Intimate partner violence:     Fear of current or ex partner: Not on file     Emotionally abused: Not on file     Physically abused: Not on file     Forced sexual activity: Not on file   Other Topics Concern    Not on file   Social History Narrative    Not on file     Family History   Problem Relation Age of Onset    Hypertension Mother     Diabetes Mother     Diabetes Father     Dementia Father     Substance Abuse Brother        Current Medications: At the time this note was written these were the medications the patient was on    Current Outpatient Medications   Medication Sig Dispense Refill    Continuous Blood Gluc  (FREESTYLE RUTH 14 DAY READER) ANNABEL Use 1 reader device to check blood sugar 1 Device 0    Continuous Blood Gluc Sensor (FREESTYLE RUTH 14 DAY SENSOR) MISC Use 1 sensor every 14 days to check blood sugar 2 each 12    GAVILYTE-G 236 g solution TAKE 4,000 ML BY MOUTH ONCE FOR 1 DOSE      glucagon (GLUCAGON EMERGENCY) 1 MG injection Inject 1 mg under the skin once as needed for low blood sugar for up to 1 dose 1 kit 0    glucose 4 g chewable tablet Chew 4 tablets (16 g total) as needed for low blood sugar 100 tablet 3    INS SYRINGE/NEEDLE 1CC/28G (B-D INS SYR MICROFINE 1CC/28G) 28G X 1/2" 1 ML MISC Use to inject insulin daily 100 each 3    insulin degludec (TRESIBA FLEXTOUCH) 100 units/mL injection pen Inject 33 Units under the skin daily 5 pen 3    insulin lispro (HumaLOG) 100 units/mL injection Inject 11 units under the skin before meals (Patient taking differently: 10 Units Inject 11 units under the skin before meals) 30 mL 3    Insulin Pen Needle (BD PEN NEEDLE EARLINE U/F) 32G X 4 MM MISC Use 4 pen needles a day to administer insulin under the skin 100 each 2    linaCLOtide (Linzess) 290 MCG CAPS Take 1 capsule by mouth daily 30 capsule 5    bisacodyl (DULCOLAX) 5 mg EC tablet Take 2 tablets (10 mg total) by mouth once for 1 dose 2 tablet 0    [employer] BISACODYL 5 MG EC tablet TAKE 2 TABLETS (10 MG TOTAL) BY MOUTH ONCE FOR 1 DOSE      docusate sodium (COLACE) 100 mg capsule Take 1 capsule (100 mg total) by mouth every 12 (twelve) hours (Patient not taking: Reported on 1/31/2020) 60 each 0    insulin glargine (LANTUS) 100 units/mL subcutaneous injection Inject 33 units under the skin every morning (Patient not taking: Reported on 2/24/2020) 30 mL 3    naproxen (NAPROSYN) 500 mg tablet Take 1 tablet (500 mg total) by mouth 2 (two) times a day with meals for 14 doses As needed for pain (Patient not taking: Reported on 2/14/2020) 14 tablet 0    polyethylene glycol (GOLYTELY) 4000 mL solution Take 4,000 mL by mouth once for 1 dose 4000 mL 0     No current facility-administered medications for this visit  Allergies: Other    Physical Exam:   Vital Signs:   /60   Pulse (!) 115   Ht 5' 6" (1 676 m)   Wt 60 3 kg (133 lb)   BMI 21 47 kg/m²     Physical Exam   Constitutional: He is oriented to person, place, and time  He appears well-developed and well-nourished  HENT:   Head: Normocephalic and atraumatic  Nose: Nose normal    Mouth/Throat: Oropharynx is clear and moist  No oropharyngeal exudate  Eyes: Pupils are equal, round, and reactive to light  Conjunctivae and EOM are normal  No scleral icterus  Neck: Normal range of motion  Neck supple  No thyromegaly present  Cardiovascular: Normal rate, regular rhythm and normal heart sounds  Exam reveals no gallop and no friction rub  No murmur heard  Pulmonary/Chest: Effort normal and breath sounds normal  No stridor  No respiratory distress  He has no wheezes  He has no rales  Abdominal: Soft  Bowel sounds are normal  He exhibits no distension and no mass  There is no tenderness  There is no rebound and no guarding  Musculoskeletal: Normal range of motion  He exhibits no edema  Lymphadenopathy:     He has no cervical adenopathy  Neurological: He is alert and oriented to person, place, and time  Skin: Skin is warm and dry  No rash noted  No erythema   No pallor  Psychiatric: He has a normal mood and affect  His behavior is normal  Judgment and thought content normal         Labs and Imaging:      Component      Latest Ref Rng & Units 2/20/2020   Sodium      136 - 145 mmol/L 139   Potassium      3 5 - 5 3 mmol/L 4 0   Chloride      100 - 108 mmol/L 106   CO2      21 - 32 mmol/L 25   Anion Gap      4 - 13 mmol/L 8   BUN      5 - 25 mg/dL 9   Creatinine      0 60 - 1 30 mg/dL 0 59 (L)   Glucose, Random      65 - 140 mg/dL 135   Calcium      8 3 - 10 1 mg/dL 9 3   AST      5 - 45 U/L 10   ALT      12 - 78 U/L 22   Alkaline Phosphatase      46 - 116 U/L 60   Total Protein      6 4 - 8 2 g/dL 7 4   Albumin      3 5 - 5 0 g/dL 3 8   TOTAL BILIRUBIN      0 20 - 1 00 mg/dL 0 67   eGFR      ml/min/1 73sq m 128   Cholesterol      50 - 200 mg/dL 150   Triglycerides      <=150 mg/dL 59   HDL      >=40 mg/dL 80   LDL Direct      0 - 100 mg/dL 58   Non-HDL Cholesterol      mg/dl 70   EXT Creatinine Urine      mg/dL 74 3   MICROALBUM ,U,RANDOM      0 0 - 20 0 mg/L 8 3   MICROALBUMIN/CREATININE RATIO      0 - 30 mg/g creatinine 11   Hemoglobin A1C      Normal 3 8-5 6%; PreDiabetic 5 7-6 4%;  Diabetic >=6 5%; Glycemic control for adults with diabetes <7 0% % 8 1 (H)   EAG      mg/dl 186

## 2020-03-07 ENCOUNTER — APPOINTMENT (OUTPATIENT)
Dept: RADIOLOGY | Age: 40
End: 2020-03-07
Payer: COMMERCIAL

## 2020-03-07 ENCOUNTER — OFFICE VISIT (OUTPATIENT)
Dept: URGENT CARE | Age: 40
End: 2020-03-07
Payer: COMMERCIAL

## 2020-03-07 VITALS
TEMPERATURE: 98 F | DIASTOLIC BLOOD PRESSURE: 72 MMHG | RESPIRATION RATE: 18 BRPM | WEIGHT: 130 LBS | HEART RATE: 116 BPM | SYSTOLIC BLOOD PRESSURE: 112 MMHG | HEIGHT: 66 IN | OXYGEN SATURATION: 98 % | BODY MASS INDEX: 20.89 KG/M2

## 2020-03-07 DIAGNOSIS — M79.672 CHRONIC FOOT PAIN, LEFT: Primary | ICD-10-CM

## 2020-03-07 DIAGNOSIS — G89.29 CHRONIC FOOT PAIN, LEFT: ICD-10-CM

## 2020-03-07 DIAGNOSIS — G89.29 CHRONIC FOOT PAIN, LEFT: Primary | ICD-10-CM

## 2020-03-07 DIAGNOSIS — M79.672 CHRONIC FOOT PAIN, LEFT: ICD-10-CM

## 2020-03-07 PROCEDURE — 99213 OFFICE O/P EST LOW 20 MIN: CPT | Performed by: NURSE PRACTITIONER

## 2020-03-07 PROCEDURE — 73630 X-RAY EXAM OF FOOT: CPT

## 2020-03-07 RX ORDER — NAPROXEN 500 MG/1
TABLET ORAL
Qty: 30 TABLET | Refills: 0 | Status: SHIPPED | OUTPATIENT
Start: 2020-03-07

## 2020-03-07 NOTE — PATIENT INSTRUCTIONS
No fractures seen on x-ray  Radiology does the final read; if they see anything I did not, we will call you  Use the naproxen (with food to decrease risk of GI upset) as needed for pain  Follow-up with podiatry for further evaluation and treatment  I suspect that this is a neuroma  See information below for more information  Metatarsalgia   AMBULATORY CARE:   Metatarsalgia  is pain in the ball of your foot, near your second, third, and fourth toes  Common signs and symptoms of metatarsalgia:  Symptoms usually develop over time, but you may have sudden pain from an injury  You may have any of the following:  · Pain at the ball of your foot or near your toes that gets worse when you walk or stand, especially on hard surfaces    · Pain during exercises such as running    · Sharp or shooting pain in your toes that may get worse when you flex your toes    · Tingling or numbness in your toes    · Feeling like you are walking over rocks, or that you have a bruise    · A change in the way you walk because you try to avoid putting pressure on the ball of your foot  Contact your healthcare provider if:   · You develop knee, back, or hip pain  · You have more pain or redness in the foot  · You have questions or concerns about your condition or care  Treatment:  The cause of your metatarsalgia will be treated, if possible  You may also need any of the following:  · NSAIDs , such as ibuprofen, help decrease swelling, pain, and fever  This medicine is available with or without a doctor's order  NSAIDs can cause stomach bleeding or kidney problems in certain people  If you take blood thinner medicine, always ask if NSAIDs are safe for you  Always read the medicine label and follow directions  Do not give these medicines to children under 10months of age without direction from your child's healthcare provider  · Ultrasound  may be used to relieve your pain   Sound waves from the ultrasound can help send heat deeper into your tissues  · A steroid injection  may help decrease inflammation  · Surgery  may be needed if other treatments do not work  Surgery is used to align the bones near your toes  You may also need surgery to fix a problem such as hammertoe  Manage or prevent metatarsalgia:   · Rest your foot  If you play sports, you may not be able to do weight-bearing exercises  Examples include swimming and bike riding  Ask your healthcare provider which exercises are safe for you  · Apply ice as directed  Ice helps reduce pain and swelling  Use an ice pack, or put crushed ice in a plastic bag  Cover the pack or bag with a towel before you apply it to your foot  Apply ice for 15 to 20 minutes every hour, or as directed  · Use a cane or crutch if directed  These devices may help take pressure off your foot while it heals  · Wear proper shoes  Do not wear shoes that are narrow or tight  You may need to wear shoes that are wider than you usually wear  Choose shoes that do not have a raised heel  Shock-absorbing shoes can help prevent injury  These shoes will have extra support under your feet and toes  You can also add shoe cushions inside your shoes or to the bottoms of your feet, near your toes  The cushions may provide more support and make walking or standing more comfortable  Arch supports may help take pressure off your toes  · Reach or maintain a healthy weight  Extra weight can put pressure on your feet  Talk to your healthcare provider about a healthy weight for you  Your provider can help you create a safe weight loss plan if you are overweight  · Go to physical therapy if directed  A physical therapist can help improve your strength and range of motion  The therapist can also help you improve the way you walk to prevent metatarsalgia from happening again  Your therapist can also teach you exercises to help relieve your pain    Follow up with your healthcare provider as directed: Write down your questions so you remember to ask them during your visits  © 2017 [employer] Information is for End User's use only and may not be sold, redistributed or otherwise used for commercial purposes  All illustrations and images included in CareNotes® are the copyrighted property of [employer]  or Philippe Montemayor  The above information is an  only  It is not intended as medical advice for individual conditions or treatments  Talk to your doctor, nurse or pharmacist before following any medical regimen to see if it is safe and effective for you  Simpson Neuroma   WHAT YOU NEED TO KNOW:   What is Simpson neuroma? Simpson neuroma is inflammation of one the nerves in your foot  It usually occurs in the ball of your foot, between your third and fourth toes  What increases my risk for Simpson neuroma? · Tight shoes or shoes with high heels or pointed toes    · Repeated trauma from high-impact sports, such as running  What are the symptoms of Simpson neuroma? · Pain    · Achy or burning sensation    · Feeling like you are stepping on a small stone or a wrinkled sock    · Numbness, tingling, or prickling that may spread to your toes  How is Flo Gambler neuroma diagnosed? · A foot and ankle exam  will be done by your healthcare provider  He will press on your foot to feel for thickened tissue  · An x-ray, ultrasound, or MRI  may be done to check for thickened tissue  These tests can also show if other problems may be causing your pain  Do not enter the MRI room with anything metal  Metal can cause serious injury  Tell the healthcare provider if you have any metal in or on your body  How is Simpson neuroma treated? The goal of treatment is to decrease pressure, pain, and swelling  · NSAIDs  help decrease swelling and pain  This medicine is available with or without a doctor's order  NSAIDs can cause stomach bleeding or kidney problems in certain people   If you take blood thinner medicine, always ask your healthcare provider if NSAIDs are safe for you  Always read the medicine label and follow directions  · An injection  of steroids, ethanol, or numbing medicine may decrease pain and swelling  · Surgery  may be needed if other treatments do not work  The tissues around the nerve may be cut to relieve pressure  The nerve may also be removed completely  How can I manage my symptoms? · Wear flat shoes with a wide toe box  This will decrease the pressure on the front of your foot  · Wear orthotics, arch supports, or foot pads  These help relieve pressure and cushion the ball of your foot  You may need a medical shoe insert ordered by your healthcare provider  · Do an ice massage  for 20 minutes, twice a day to decrease pain and swelling  Freeze a paper or foam cup filled with water and roll it under your foot  When should I contact my healthcare provider? · Your symptoms spread to your toes  · Your symptoms do not improve after treatment  · You have questions or concerns about your condition or care  CARE AGREEMENT:   You have the right to help plan your care  Learn about your health condition and how it may be treated  Discuss treatment options with your caregivers to decide what care you want to receive  You always have the right to refuse treatment  The above information is an  only  It is not intended as medical advice for individual conditions or treatments  Talk to your doctor, nurse or pharmacist before following any medical regimen to see if it is safe and effective for you  © 2017 [employer] Information is for End User's use only and may not be sold, redistributed or otherwise used for commercial purposes  All illustrations and images included in CareNotes® are the copyrighted property of A D A M [employer] Inc  or Philippe Montemayor

## 2020-03-07 NOTE — PROGRESS NOTES
[facility]        NAME: David Harrell is a 46 y o  male  : 1980    MRN: [id number]  DATE: 2020  TIME: 2:38 PM    Assessment and Plan   Chronic foot pain, left [E48 779, W25 27]  1  Chronic foot pain, left  XR foot 3+ vw left    Ambulatory referral to Podiatry    naproxen (NAPROSYN) 500 mg tablet         Patient Instructions     Patient Instructions   No fractures seen on x-ray  Radiology does the final read; if they see anything I did not, we will call you  Use the naproxen (with food to decrease risk of GI upset) as needed for pain  Follow-up with podiatry for further evaluation and treatment  I suspect that this is a neuroma  See information below for more information  Metatarsalgia   AMBULATORY CARE:   Metatarsalgia  is pain in the ball of your foot, near your second, third, and fourth toes  Common signs and symptoms of metatarsalgia:  Symptoms usually develop over time, but you may have sudden pain from an injury  You may have any of the following:  · Pain at the ball of your foot or near your toes that gets worse when you walk or stand, especially on hard surfaces    · Pain during exercises such as running    · Sharp or shooting pain in your toes that may get worse when you flex your toes    · Tingling or numbness in your toes    · Feeling like you are walking over rocks, or that you have a bruise    · A change in the way you walk because you try to avoid putting pressure on the ball of your foot  Contact your healthcare provider if:   · You develop knee, back, or hip pain  · You have more pain or redness in the foot  · You have questions or concerns about your condition or care  Treatment:  The cause of your metatarsalgia will be treated, if possible  You may also need any of the following:  · NSAIDs , such as ibuprofen, help decrease swelling, pain, and fever  This medicine is available with or without a doctor's order   NSAIDs can cause stomach bleeding or kidney problems in certain people  If you take blood thinner medicine, always ask if NSAIDs are safe for you  Always read the medicine label and follow directions  Do not give these medicines to children under 10months of age without direction from your child's healthcare provider  · Ultrasound  may be used to relieve your pain  Sound waves from the ultrasound can help send heat deeper into your tissues  · A steroid injection  may help decrease inflammation  · Surgery  may be needed if other treatments do not work  Surgery is used to align the bones near your toes  You may also need surgery to fix a problem such as hammertoe  Manage or prevent metatarsalgia:   · Rest your foot  If you play sports, you may not be able to do weight-bearing exercises  Examples include swimming and bike riding  Ask your healthcare provider which exercises are safe for you  · Apply ice as directed  Ice helps reduce pain and swelling  Use an ice pack, or put crushed ice in a plastic bag  Cover the pack or bag with a towel before you apply it to your foot  Apply ice for 15 to 20 minutes every hour, or as directed  · Use a cane or crutch if directed  These devices may help take pressure off your foot while it heals  · Wear proper shoes  Do not wear shoes that are narrow or tight  You may need to wear shoes that are wider than you usually wear  Choose shoes that do not have a raised heel  Shock-absorbing shoes can help prevent injury  These shoes will have extra support under your feet and toes  You can also add shoe cushions inside your shoes or to the bottoms of your feet, near your toes  The cushions may provide more support and make walking or standing more comfortable  Arch supports may help take pressure off your toes  · Reach or maintain a healthy weight  Extra weight can put pressure on your feet  Talk to your healthcare provider about a healthy weight for you   Your provider can help you create a safe weight loss plan if you are overweight  · Go to physical therapy if directed  A physical therapist can help improve your strength and range of motion  The therapist can also help you improve the way you walk to prevent metatarsalgia from happening again  Your therapist can also teach you exercises to help relieve your pain  Follow up with your healthcare provider as directed:  Write down your questions so you remember to ask them during your visits  © 2017 [employer] Information is for End User's use only and may not be sold, redistributed or otherwise used for commercial purposes  All illustrations and images included in CareNotes® are the copyrighted property of [employer] Inc  or Philippe Montemayor  The above information is an  only  It is not intended as medical advice for individual conditions or treatments  Talk to your doctor, nurse or pharmacist before following any medical regimen to see if it is safe and effective for you  Simpson Neuroma   WHAT YOU NEED TO KNOW:   What is Simpson neuroma? Simpson neuroma is inflammation of one the nerves in your foot  It usually occurs in the ball of your foot, between your third and fourth toes  What increases my risk for Simpson neuroma? · Tight shoes or shoes with high heels or pointed toes    · Repeated trauma from high-impact sports, such as running  What are the symptoms of Simpson neuroma? · Pain    · Achy or burning sensation    · Feeling like you are stepping on a small stone or a wrinkled sock    · Numbness, tingling, or prickling that may spread to your toes  How is Max Chantell neuroma diagnosed? · A foot and ankle exam  will be done by your healthcare provider  He will press on your foot to feel for thickened tissue  · An x-ray, ultrasound, or MRI  may be done to check for thickened tissue  These tests can also show if other problems may be causing your pain   Do not enter the MRI room with anything metal  Metal can cause serious injury  Tell the healthcare provider if you have any metal in or on your body  How is Simpson neuroma treated? The goal of treatment is to decrease pressure, pain, and swelling  · NSAIDs  help decrease swelling and pain  This medicine is available with or without a doctor's order  NSAIDs can cause stomach bleeding or kidney problems in certain people  If you take blood thinner medicine, always ask your healthcare provider if NSAIDs are safe for you  Always read the medicine label and follow directions  · An injection  of steroids, ethanol, or numbing medicine may decrease pain and swelling  · Surgery  may be needed if other treatments do not work  The tissues around the nerve may be cut to relieve pressure  The nerve may also be removed completely  How can I manage my symptoms? · Wear flat shoes with a wide toe box  This will decrease the pressure on the front of your foot  · Wear orthotics, arch supports, or foot pads  These help relieve pressure and cushion the ball of your foot  You may need a medical shoe insert ordered by your healthcare provider  · Do an ice massage  for 20 minutes, twice a day to decrease pain and swelling  Freeze a paper or foam cup filled with water and roll it under your foot  When should I contact my healthcare provider? · Your symptoms spread to your toes  · Your symptoms do not improve after treatment  · You have questions or concerns about your condition or care  CARE AGREEMENT:   You have the right to help plan your care  Learn about your health condition and how it may be treated  Discuss treatment options with your caregivers to decide what care you want to receive  You always have the right to refuse treatment  The above information is an  only  It is not intended as medical advice for individual conditions or treatments   Talk to your doctor, nurse or pharmacist before following any medical regimen to see if it is safe and effective for you  © 2017 [employer] [employer] Information is for End User's use only and may not be sold, redistributed or otherwise used for commercial purposes  All illustrations and images included in CareNotes® are the copyrighted property of [employer]  or Philippe Montemayor  Follow up with PCP in 3-5 days  Proceed to  ER if symptoms worsen  Chief Complaint     Chief Complaint   Patient presents with    Foot Pain     c/o of left foot pain for several months on the bottom for his 2nd toe area- walking seems to make the pain worse         History of Present Illness       Patient reports pain at the base of his left foot "2nd toe" (shows me 4th; I explained that the #'s start with great toe), at the MTP joint  He states it has been going on for at least a few months, probably longer--he thinks it was present last summer  He states the pain is typically dull, mild and pretty constant with walking and deep palpation, but he will occasionally have a sharp stabbing pain there as well  No treatments tried  Patient states he thought it would eventually go away, but it has not, so he presents to be seen  Review of Systems   Review of Systems   Musculoskeletal: Positive for arthralgias  All other systems reviewed and are negative          Current Medications       Current Outpatient Medications:     bisacodyl (DULCOLAX) 5 mg EC tablet, Take 2 tablets (10 mg total) by mouth once for 1 dose, Disp: 2 tablet, Rfl: 0    Continuous Blood Gluc  (FREESTYLE RUTH 14 DAY READER) ANNABEL, Use 1 reader device to check blood sugar, Disp: 1 Device, Rfl: 0    Continuous Blood Gluc Sensor (FREESTYLE RUTH 14 DAY SENSOR) MISC, Use 1 sensor every 14 days to check blood sugar, Disp: 2 each, Rfl: 12    [employer] BISACODYL 5 MG EC tablet, TAKE 2 TABLETS (10 MG TOTAL) BY MOUTH ONCE FOR 1 DOSE, Disp: , Rfl:     docusate sodium (COLACE) 100 mg capsule, Take 1 capsule (100 mg total) by mouth every 12 (twelve) hours (Patient not taking: Reported on 1/31/2020), Disp: 60 each, Rfl: 0    GAVILYTE-G 236 g solution, TAKE 4,000 ML BY MOUTH ONCE FOR 1 DOSE, Disp: , Rfl:     glucagon (GLUCAGON EMERGENCY) 1 MG injection, Inject 1 mg under the skin once as needed for low blood sugar for up to 1 dose, Disp: 1 kit, Rfl: 0    glucose 4 g chewable tablet, Chew 4 tablets (16 g total) as needed for low blood sugar, Disp: 100 tablet, Rfl: 3    INS SYRINGE/NEEDLE 1CC/28G (B-D INS SYR MICROFINE 1CC/28G) 28G X 1/2" 1 ML MISC, Use to inject insulin daily, Disp: 100 each, Rfl: 3    insulin degludec (TRESIBA FLEXTOUCH) 100 units/mL injection pen, Inject 33 Units under the skin daily, Disp: 5 pen, Rfl: 3    insulin glargine (LANTUS) 100 units/mL subcutaneous injection, Inject 33 units under the skin every morning (Patient not taking: Reported on 2/24/2020), Disp: 30 mL, Rfl: 3    insulin lispro (HumaLOG KwikPen) 100 units/mL injection pen, Use 7 units under the skin with breakfast and 5 units under the skin with lunch and dinner, Disp: 5 pen, Rfl: 3    insulin lispro (HumaLOG) 100 units/mL injection, Inject 11 units under the skin before meals (Patient taking differently: 10 Units Inject 11 units under the skin before meals), Disp: 30 mL, Rfl: 3    Insulin Pen Needle (BD PEN NEEDLE EARLINE U/F) 32G X 4 MM MISC, Use 4 pen needles a day to administer insulin under the skin, Disp: 100 each, Rfl: 2    linaCLOtide (Linzess) 290 MCG CAPS, Take 1 capsule by mouth daily, Disp: 30 capsule, Rfl: 5    naproxen (NAPROSYN) 500 mg tablet, Every 12 hours as needed for mild-mod pain; take with food, Disp: 30 tablet, Rfl: 0    polyethylene glycol (GOLYTELY) 4000 mL solution, Take 4,000 mL by mouth once for 1 dose, Disp: 4000 mL, Rfl: 0    Current Allergies     Allergies as of 03/07/2020 - Reviewed 03/07/2020   Allergen Reaction Noted    Other Allergic Rhinitis 01/25/2019            The following portions of the patient's history were reviewed and updated as appropriate: allergies, current medications, past family history, past medical history, past social history, past surgical history and problem list      Past Medical History:   Diagnosis Date    Allergic     Diabetes mellitus ([facility] [address] ) 10/25/2019       No past surgical history on file  Family History   Problem Relation Age of Onset    Hypertension Mother     Diabetes Mother     Diabetes Father     Dementia Father     Substance Abuse Brother          Medications have been verified  Objective   /72 (BP Location: Right arm, Patient Position: Sitting, Cuff Size: Standard)   Pulse (!) 116   Temp 98 °F (36 7 °C) (Temporal)   Resp 18   Ht 5' 6" (1 676 m)   Wt 59 kg (130 lb)   SpO2 98%   BMI 20 98 kg/m²        Physical Exam     Physical Exam   Constitutional: He is oriented to person, place, and time  He appears well-developed and well-nourished  No distress  HENT:   Head: Normocephalic and atraumatic  Eyes: Pupils are equal, round, and reactive to light  Neck: Normal range of motion  Neck supple  Pulmonary/Chest: Effort normal  No respiratory distress  Abdominal: Soft  He exhibits no distension  Musculoskeletal: Normal range of motion  Left foot: There is tenderness and bony tenderness  There is normal range of motion, no swelling, normal capillary refill, no crepitus, no deformity and no laceration  Feet:    Neurological: He is alert and oriented to person, place, and time  Skin: Skin is warm and dry  Capillary refill takes less than 2 seconds  He is not diaphoretic  Psychiatric: He has a normal mood and affect  His behavior is normal  Judgment and thought content normal    Nursing note and vitals reviewed

## 2020-03-11 NOTE — ASSESSMENT & PLAN NOTE
Congratulated on continued abstinence, quit almost 1 year ago! Patient's last office with Dr. Jocy Aguilar on 9/20/2019 with instructions to continue albuterol 2-4 puffs or 1 neb every 4 hours as needed and follow up in 1 week. Follow up appointment is not scheduled. Refill sent per protocol with no refills until seen in clinic.

## 2020-03-23 ENCOUNTER — TELEPHONE (OUTPATIENT)
Dept: ENDOCRINOLOGY | Facility: CLINIC | Age: 40
End: 2020-03-23

## 2020-03-24 ENCOUNTER — TREATMENT (OUTPATIENT)
Dept: ENDOCRINOLOGY | Facility: CLINIC | Age: 40
End: 2020-03-24
Payer: COMMERCIAL

## 2020-03-24 DIAGNOSIS — E13.9 LATENT AUTOIMMUNE DIABETES IN ADULTS (LADA), MANAGED AS TYPE 1 (HCC): Primary | ICD-10-CM

## 2020-03-24 PROCEDURE — 95251 CONT GLUC MNTR ANALYSIS I&R: CPT | Performed by: INTERNAL MEDICINE

## 2020-03-24 NOTE — TELEPHONE ENCOUNTER
Rimma Anguiano,      Can you call him and ask him to upload his FreeStyle Taj CGM data so we can take a look at his readings  If you can print the report out that would be great, thanks    Venancio Lennox M D Endocrinology

## 2020-03-24 NOTE — PROGRESS NOTES
Freestyle Taj CGM interpretation 3/24/20     CGM worn 79% of time March 11-24th, 2020  Time in range 51% (goal >65-70%)  No hypoglycemia 6%  CV 40 8% (goal <33%)     Average glucose 168 mg/dL  GMI 7 3%    Parameters have improved overall, TIR increased from 42, Hypoglycemia decreased from 8%, CV decreased from 46 5 and GMI decreased to 7 3%  I spoke with patient, reviewed report  Hypoglycemic episodes random, occasionally related to eating too little carbs with meals  No changes to basal/bolus regimen for now  On Tresiba 31 units qAM and humalog 7-5-5 for now  David AMES    Endocrinology

## 2020-03-30 ENCOUNTER — TELEPHONE (OUTPATIENT)
Dept: UROLOGY | Facility: MEDICAL CENTER | Age: 40
End: 2020-03-30

## 2020-03-30 NOTE — TELEPHONE ENCOUNTER
Left message; Is he looking to reschedule the appointment or to cancel it entirely  I'm leaving it scheduled until he calls back to clarify

## 2020-03-31 NOTE — TELEPHONE ENCOUNTER
Pt lmom 8:38am thank you for trying to accommodate me with appointment but he wants any appointment canceled

## 2020-04-06 ENCOUNTER — OFFICE VISIT (OUTPATIENT)
Dept: PODIATRY | Facility: CLINIC | Age: 40
End: 2020-04-06
Payer: COMMERCIAL

## 2020-04-06 VITALS — HEIGHT: 66 IN | WEIGHT: 135 LBS | BODY MASS INDEX: 21.69 KG/M2

## 2020-04-06 DIAGNOSIS — G89.29 CHRONIC FOOT PAIN, LEFT: ICD-10-CM

## 2020-04-06 DIAGNOSIS — G57.62 MORTON'S NEUROMA OF THIRD INTERSPACE OF LEFT FOOT: Primary | ICD-10-CM

## 2020-04-06 DIAGNOSIS — M79.672 CHRONIC FOOT PAIN, LEFT: ICD-10-CM

## 2020-04-06 PROCEDURE — 99203 OFFICE O/P NEW LOW 30 MIN: CPT | Performed by: PODIATRIST

## 2020-04-06 PROCEDURE — 64455 NJX AA&/STRD PLTR COM DG NRV: CPT | Performed by: PODIATRIST

## 2020-04-06 PROCEDURE — 3052F HG A1C>EQUAL 8.0%<EQUAL 9.0%: CPT | Performed by: PODIATRIST

## 2020-04-06 PROCEDURE — 1036F TOBACCO NON-USER: CPT | Performed by: PODIATRIST

## 2020-04-06 PROCEDURE — 3008F BODY MASS INDEX DOCD: CPT | Performed by: PODIATRIST

## 2020-04-06 RX ORDER — TRIAMCINOLONE ACETONIDE 40 MG/ML
40 INJECTION, SUSPENSION INTRA-ARTICULAR; INTRAMUSCULAR ONCE
Status: COMPLETED | OUTPATIENT
Start: 2020-04-06 | End: 2020-04-06

## 2020-04-06 RX ORDER — LIDOCAINE HYDROCHLORIDE 10 MG/ML
1 INJECTION, SOLUTION INFILTRATION; PERINEURAL ONCE
Status: COMPLETED | OUTPATIENT
Start: 2020-04-06 | End: 2020-04-06

## 2020-04-06 RX ADMIN — TRIAMCINOLONE ACETONIDE 40 MG: 40 INJECTION, SUSPENSION INTRA-ARTICULAR; INTRAMUSCULAR at 08:08

## 2020-04-06 RX ADMIN — LIDOCAINE HYDROCHLORIDE 1 ML: 10 INJECTION, SOLUTION INFILTRATION; PERINEURAL at 08:08

## 2020-04-24 DIAGNOSIS — E10.65 TYPE 1 DIABETES MELLITUS WITH HYPERGLYCEMIA (HCC): ICD-10-CM

## 2020-04-27 ENCOUNTER — TELEPHONE (OUTPATIENT)
Dept: ENDOCRINOLOGY | Facility: CLINIC | Age: 40
End: 2020-04-27

## 2020-04-27 ENCOUNTER — TREATMENT (OUTPATIENT)
Dept: ENDOCRINOLOGY | Facility: CLINIC | Age: 40
End: 2020-04-27

## 2020-04-27 ENCOUNTER — TELEMEDICINE (OUTPATIENT)
Dept: ENDOCRINOLOGY | Facility: CLINIC | Age: 40
End: 2020-04-27
Payer: COMMERCIAL

## 2020-04-27 DIAGNOSIS — K59.00 CONSTIPATION, UNSPECIFIED CONSTIPATION TYPE: ICD-10-CM

## 2020-04-27 DIAGNOSIS — R39.11 URINARY HESITANCY: ICD-10-CM

## 2020-04-27 DIAGNOSIS — E13.9 LATENT AUTOIMMUNE DIABETES IN ADULTS (LADA), MANAGED AS TYPE 1 (HCC): ICD-10-CM

## 2020-04-27 DIAGNOSIS — E16.2 HYPOGLYCEMIA: ICD-10-CM

## 2020-04-27 DIAGNOSIS — E10.65 TYPE 1 DIABETES MELLITUS WITH HYPERGLYCEMIA (HCC): Primary | ICD-10-CM

## 2020-04-27 PROCEDURE — 95251 CONT GLUC MNTR ANALYSIS I&R: CPT | Performed by: INTERNAL MEDICINE

## 2020-04-27 PROCEDURE — 99213 OFFICE O/P EST LOW 20 MIN: CPT | Performed by: INTERNAL MEDICINE

## 2020-05-01 ENCOUNTER — OFFICE VISIT (OUTPATIENT)
Dept: FAMILY MEDICINE CLINIC | Facility: CLINIC | Age: 40
End: 2020-05-01
Payer: COMMERCIAL

## 2020-05-01 VITALS
RESPIRATION RATE: 16 BRPM | WEIGHT: 133 LBS | TEMPERATURE: 97.5 F | HEART RATE: 118 BPM | HEIGHT: 66 IN | BODY MASS INDEX: 21.38 KG/M2 | DIASTOLIC BLOOD PRESSURE: 86 MMHG | SYSTOLIC BLOOD PRESSURE: 106 MMHG | OXYGEN SATURATION: 99 %

## 2020-05-01 DIAGNOSIS — Z87.891 FORMER SMOKER: ICD-10-CM

## 2020-05-01 DIAGNOSIS — E13.9 LADA (LATENT AUTOIMMUNE DIABETES IN ADULTS), MANAGED AS TYPE 1 (HCC): ICD-10-CM

## 2020-05-01 DIAGNOSIS — R39.11 URINARY HESITANCY: ICD-10-CM

## 2020-05-01 DIAGNOSIS — Z23 NEED FOR VACCINATION: ICD-10-CM

## 2020-05-01 DIAGNOSIS — R33.9 URINARY RETENTION WITH INCOMPLETE BLADDER EMPTYING: ICD-10-CM

## 2020-05-01 DIAGNOSIS — Z00.00 ROUTINE ADULT HEALTH MAINTENANCE: Primary | ICD-10-CM

## 2020-05-01 PROBLEM — K59.00 CONSTIPATION: Status: RESOLVED | Noted: 2020-02-14 | Resolved: 2020-05-01

## 2020-05-01 PROCEDURE — 90471 IMMUNIZATION ADMIN: CPT

## 2020-05-01 PROCEDURE — 99395 PREV VISIT EST AGE 18-39: CPT | Performed by: FAMILY MEDICINE

## 2020-05-01 PROCEDURE — 90746 HEPB VACCINE 3 DOSE ADULT IM: CPT

## 2020-05-01 PROCEDURE — 3052F HG A1C>EQUAL 8.0%<EQUAL 9.0%: CPT | Performed by: FAMILY MEDICINE

## 2020-05-04 ENCOUNTER — OFFICE VISIT (OUTPATIENT)
Dept: PODIATRY | Facility: CLINIC | Age: 40
End: 2020-05-04
Payer: COMMERCIAL

## 2020-05-04 VITALS — BODY MASS INDEX: 21.6 KG/M2 | WEIGHT: 134.4 LBS | HEIGHT: 66 IN

## 2020-05-04 DIAGNOSIS — G57.62 MORTON'S NEUROMA OF THIRD INTERSPACE OF LEFT FOOT: Primary | ICD-10-CM

## 2020-05-04 PROCEDURE — 64455 NJX AA&/STRD PLTR COM DG NRV: CPT | Performed by: PODIATRIST

## 2020-05-04 RX ORDER — LIDOCAINE HYDROCHLORIDE 10 MG/ML
1 INJECTION, SOLUTION INFILTRATION; PERINEURAL ONCE
Status: COMPLETED | OUTPATIENT
Start: 2020-05-04 | End: 2020-05-04

## 2020-05-04 RX ORDER — TRIAMCINOLONE ACETONIDE 40 MG/ML
40 INJECTION, SUSPENSION INTRA-ARTICULAR; INTRAMUSCULAR ONCE
Status: COMPLETED | OUTPATIENT
Start: 2020-05-04 | End: 2020-05-04

## 2020-05-04 RX ADMIN — LIDOCAINE HYDROCHLORIDE 1 ML: 10 INJECTION, SOLUTION INFILTRATION; PERINEURAL at 08:34

## 2020-05-04 RX ADMIN — TRIAMCINOLONE ACETONIDE 40 MG: 40 INJECTION, SUSPENSION INTRA-ARTICULAR; INTRAMUSCULAR at 08:34

## 2020-05-21 ENCOUNTER — APPOINTMENT (OUTPATIENT)
Dept: LAB | Age: 40
End: 2020-05-21
Payer: COMMERCIAL

## 2020-05-21 DIAGNOSIS — E16.2 HYPOGLYCEMIA: ICD-10-CM

## 2020-05-21 DIAGNOSIS — R39.11 URINARY HESITANCY: ICD-10-CM

## 2020-05-21 DIAGNOSIS — E13.9 LATENT AUTOIMMUNE DIABETES IN ADULTS (LADA), MANAGED AS TYPE 1 (HCC): ICD-10-CM

## 2020-05-21 DIAGNOSIS — E10.65 TYPE 1 DIABETES MELLITUS WITH HYPERGLYCEMIA (HCC): ICD-10-CM

## 2020-05-21 DIAGNOSIS — K59.00 CONSTIPATION, UNSPECIFIED CONSTIPATION TYPE: ICD-10-CM

## 2020-05-21 LAB
ALBUMIN SERPL BCP-MCNC: 3.7 G/DL (ref 3.5–5)
ALP SERPL-CCNC: 63 U/L (ref 46–116)
ALT SERPL W P-5'-P-CCNC: 24 U/L (ref 12–78)
ANION GAP SERPL CALCULATED.3IONS-SCNC: 5 MMOL/L (ref 4–13)
AST SERPL W P-5'-P-CCNC: 12 U/L (ref 5–45)
BILIRUB SERPL-MCNC: 0.65 MG/DL (ref 0.2–1)
BUN SERPL-MCNC: 12 MG/DL (ref 5–25)
CALCIUM SERPL-MCNC: 9.8 MG/DL (ref 8.3–10.1)
CHLORIDE SERPL-SCNC: 105 MMOL/L (ref 100–108)
CO2 SERPL-SCNC: 27 MMOL/L (ref 21–32)
CREAT SERPL-MCNC: 0.62 MG/DL (ref 0.6–1.3)
EST. AVERAGE GLUCOSE BLD GHB EST-MCNC: 154 MG/DL
GFR SERPL CREATININE-BSD FRML MDRD: 126 ML/MIN/1.73SQ M
GLUCOSE P FAST SERPL-MCNC: 134 MG/DL (ref 65–99)
HBA1C MFR BLD: 7 %
POTASSIUM SERPL-SCNC: 5.1 MMOL/L (ref 3.5–5.3)
PROT SERPL-MCNC: 7.6 G/DL (ref 6.4–8.2)
SODIUM SERPL-SCNC: 137 MMOL/L (ref 136–145)

## 2020-05-21 PROCEDURE — 36415 COLL VENOUS BLD VENIPUNCTURE: CPT

## 2020-05-21 PROCEDURE — 84681 ASSAY OF C-PEPTIDE: CPT

## 2020-05-21 PROCEDURE — 83036 HEMOGLOBIN GLYCOSYLATED A1C: CPT

## 2020-05-21 PROCEDURE — 80053 COMPREHEN METABOLIC PANEL: CPT

## 2020-05-21 PROCEDURE — 3051F HG A1C>EQUAL 7.0%<8.0%: CPT | Performed by: INTERNAL MEDICINE

## 2020-05-22 LAB — C PEPTIDE SERPL-MCNC: 0.5 NG/ML (ref 1.1–4.4)

## 2020-05-26 ENCOUNTER — TELEPHONE (OUTPATIENT)
Dept: ENDOCRINOLOGY | Facility: CLINIC | Age: 40
End: 2020-05-26

## 2020-05-29 ENCOUNTER — TELEPHONE (OUTPATIENT)
Dept: ENDOCRINOLOGY | Facility: CLINIC | Age: 40
End: 2020-05-29

## 2020-05-29 ENCOUNTER — TREATMENT (OUTPATIENT)
Dept: ENDOCRINOLOGY | Facility: CLINIC | Age: 40
End: 2020-05-29
Payer: COMMERCIAL

## 2020-05-29 DIAGNOSIS — E10.65 TYPE 1 DIABETES MELLITUS WITH HYPERGLYCEMIA (HCC): Primary | ICD-10-CM

## 2020-05-29 PROCEDURE — 95251 CONT GLUC MNTR ANALYSIS I&R: CPT | Performed by: INTERNAL MEDICINE

## 2020-06-08 PROBLEM — G57.62 MORTON'S NEUROMA OF THIRD INTERSPACE OF LEFT FOOT: Status: ACTIVE | Noted: 2020-06-08

## 2020-06-22 ENCOUNTER — OFFICE VISIT (OUTPATIENT)
Dept: ENDOCRINOLOGY | Facility: CLINIC | Age: 40
End: 2020-06-22
Payer: COMMERCIAL

## 2020-06-22 VITALS
DIASTOLIC BLOOD PRESSURE: 78 MMHG | BODY MASS INDEX: 22.34 KG/M2 | TEMPERATURE: 98 F | SYSTOLIC BLOOD PRESSURE: 118 MMHG | WEIGHT: 139 LBS | HEIGHT: 66 IN | HEART RATE: 118 BPM

## 2020-06-22 DIAGNOSIS — E10.65 TYPE 1 DIABETES MELLITUS WITH HYPERGLYCEMIA (HCC): ICD-10-CM

## 2020-06-22 DIAGNOSIS — E13.9 LADA (LATENT AUTOIMMUNE DIABETES IN ADULTS), MANAGED AS TYPE 1 (HCC): Primary | ICD-10-CM

## 2020-06-22 DIAGNOSIS — E13.9 LADA (LATENT AUTOIMMUNE DIABETES IN ADULTS), MANAGED AS TYPE 1 (HCC): ICD-10-CM

## 2020-06-22 PROCEDURE — 1036F TOBACCO NON-USER: CPT | Performed by: INTERNAL MEDICINE

## 2020-06-22 PROCEDURE — 99214 OFFICE O/P EST MOD 30 MIN: CPT | Performed by: INTERNAL MEDICINE

## 2020-06-22 PROCEDURE — 95251 CONT GLUC MNTR ANALYSIS I&R: CPT | Performed by: INTERNAL MEDICINE

## 2020-06-22 PROCEDURE — 3051F HG A1C>EQUAL 7.0%<8.0%: CPT | Performed by: INTERNAL MEDICINE

## 2020-06-22 PROCEDURE — 3008F BODY MASS INDEX DOCD: CPT | Performed by: INTERNAL MEDICINE

## 2020-06-29 DIAGNOSIS — E10.65 TYPE 1 DIABETES MELLITUS WITH HYPERGLYCEMIA (HCC): Primary | ICD-10-CM

## 2020-07-06 DIAGNOSIS — E10.65 TYPE 1 DIABETES MELLITUS WITH HYPERGLYCEMIA (HCC): ICD-10-CM

## 2020-07-13 DIAGNOSIS — E13.9 LATENT AUTOIMMUNE DIABETES IN ADULTS (LADA), MANAGED AS TYPE 1 (HCC): ICD-10-CM

## 2020-07-13 RX ORDER — FLASH GLUCOSE SENSOR
KIT MISCELLANEOUS
Qty: 2 EACH | Refills: 12 | Status: SHIPPED | OUTPATIENT
Start: 2020-07-13 | End: 2021-04-12 | Stop reason: SDUPTHER

## 2020-07-16 DIAGNOSIS — E10.65 TYPE 1 DIABETES MELLITUS WITH HYPERGLYCEMIA (HCC): ICD-10-CM

## 2020-08-04 DIAGNOSIS — E13.9 LATENT AUTOIMMUNE DIABETES IN ADULTS (LADA), MANAGED AS TYPE 1 (HCC): ICD-10-CM

## 2020-08-04 DIAGNOSIS — E10.65 TYPE 1 DIABETES MELLITUS WITH HYPERGLYCEMIA (HCC): ICD-10-CM

## 2020-08-04 RX ORDER — INSULIN DEGLUDEC INJECTION 100 U/ML
INJECTION, SOLUTION SUBCUTANEOUS
Qty: 5 PEN | Refills: 3 | Status: SHIPPED | OUTPATIENT
Start: 2020-08-04 | End: 2021-02-24 | Stop reason: SDUPTHER

## 2020-08-04 RX ORDER — INSULIN DEGLUDEC INJECTION 100 U/ML
INJECTION, SOLUTION SUBCUTANEOUS
Qty: 5 PEN | Refills: 3 | Status: SHIPPED | OUTPATIENT
Start: 2020-08-04 | End: 2020-08-04 | Stop reason: SDUPTHER

## 2020-08-04 RX ORDER — INSULIN DEGLUDEC INJECTION 100 U/ML
INJECTION, SOLUTION SUBCUTANEOUS
Qty: 5 PEN | Refills: 3 | Status: SHIPPED | OUTPATIENT
Start: 2020-08-04 | End: 2020-08-04

## 2020-08-04 NOTE — TELEPHONE ENCOUNTER
Spoke with patient, reviewed InPen report    Having low BGs on 5 days in the morning    Will reduce tresiba to 27 units qAM    Continue humalog based on meal planning mode, he wants to stick to that for now instead of using the IC mode

## 2020-08-05 DIAGNOSIS — E10.65 TYPE 1 DIABETES MELLITUS WITH HYPERGLYCEMIA (HCC): ICD-10-CM

## 2020-08-06 ENCOUNTER — OFFICE VISIT (OUTPATIENT)
Dept: ENDOCRINOLOGY | Facility: CLINIC | Age: 40
End: 2020-08-06
Payer: COMMERCIAL

## 2020-08-06 VITALS
DIASTOLIC BLOOD PRESSURE: 80 MMHG | HEART RATE: 97 BPM | BODY MASS INDEX: 23.46 KG/M2 | WEIGHT: 146 LBS | SYSTOLIC BLOOD PRESSURE: 102 MMHG | TEMPERATURE: 98.7 F | HEIGHT: 66 IN

## 2020-08-06 DIAGNOSIS — E10.65 TYPE 1 DIABETES MELLITUS WITH HYPERGLYCEMIA (HCC): Primary | ICD-10-CM

## 2020-08-06 PROCEDURE — 99214 OFFICE O/P EST MOD 30 MIN: CPT | Performed by: INTERNAL MEDICINE

## 2020-08-06 PROCEDURE — 1036F TOBACCO NON-USER: CPT | Performed by: INTERNAL MEDICINE

## 2020-08-06 PROCEDURE — 3008F BODY MASS INDEX DOCD: CPT | Performed by: INTERNAL MEDICINE

## 2020-08-06 PROCEDURE — 3051F HG A1C>EQUAL 7.0%<8.0%: CPT | Performed by: INTERNAL MEDICINE

## 2020-08-06 RX ORDER — DEXTROSE 4 G
TABLET,CHEWABLE ORAL
COMMUNITY
Start: 2020-06-28

## 2020-08-06 NOTE — PROGRESS NOTES
ENDOCRINOLOGY  FOLLOW UP VISIT      Reason for Endocrine Consult/Chief Complaint: DM management       Medical Decision Making:     Impression  1  SIA  2  Urinary hesitancy- now resolved  3  Constipation - now resolved  4  Left anterior foot pain- seeing podiatry, zambrano's neuroma s/p steroid injection      Recommendations:     Was having some low BGs in the morning, reduced Tresiba to 27 units in AM  A few days ago to limit overnight hypoglycemia       Continue InPen device for mealtime insulin based on meal setting  ISF 45, target , max dose 7 units, AIT 5hrs  Instructed to send me inpen report in 2 weeks for review  Discussed vargas 2 device which just came out- will prescribe at next visit     This patient requires therapeutic CGM  The patient has diabetes and has been using a home glucose monitor and is performing frequent (2 or more times a day) home glucose monitor testing and is presently being treated with multi-dose insulin therapy (MDI) with 3 or more injections a day or with a continuous subcutaneous insulin infusion (CSII) pump  This patient's insulin treatment regimen requires frequent adjustments on the basis of therapeutic CGM testing results       Urinary hesitancy- now resolved per pt     Constipation- now resolved per pt     Left anterior foot pain- seeing podiatry, due to zambrano's neuroma s/p steroid injection with some relief, not diabetic neuropathy      A total of 25 minutes were spent face-to-face with the patient during this encounter and over half of that time was spent on counseling and coordination of care  We discussed in depth using the InPen device, reviewing the report, new CGM technology        Return to clinic in 2 months   Najma AMES            History of Present Illness:   Mr Annie Garcia is a 45-year-old male who presents for diabetes Mattie Sierra was hospitalized in October 2019 with elevated blood sugars   He was seen by the Endocrinology service and autoantibodies were checked   His IA -2 antibody was positive  ? PMH-SIA  PSH-none known  FHx-father with diabetes type 2  SHx-quit smoking 1 year ago, social ETOH, work 2 jobs [employer]      Type of DM:  SIA  Age of onset:  October 2019  Microvascular complications: none known   Macrovascular complications: none known            Events since last visit:     On tresiba 27 units qHS and humalog with InPen device based on meal setting with breakfast/lunch/dinner      FreeStyle Taj report could not be downloaded- lost user info, need to resend link to reconnect to clinic   ? ? Review of Systems:     Review of Systems   Constitutional: Negative for appetite change, chills, diaphoresis, fatigue, fever and unexpected weight change  HENT: Negative for congestion, ear pain, hearing loss, rhinorrhea, sinus pressure, sinus pain, sore throat, trouble swallowing and voice change  Eyes: Negative for photophobia, redness and visual disturbance  Respiratory: Negative for apnea, cough, chest tightness, shortness of breath, wheezing and stridor  Cardiovascular: Negative for chest pain, palpitations and leg swelling  Gastrointestinal: Negative for abdominal distention, abdominal pain, constipation, diarrhea, nausea and vomiting  Endocrine: Negative for cold intolerance, heat intolerance, polydipsia, polyphagia and polyuria  Genitourinary: Negative for difficulty urinating, dysuria, flank pain, frequency, hematuria and urgency  Musculoskeletal: Negative for arthralgias, back pain, gait problem, joint swelling and myalgias  Skin: Negative for color change, pallor, rash and wound  Allergic/Immunologic: Negative for immunocompromised state  Neurological: Negative for dizziness, tremors, syncope, weakness, light-headedness and headaches  Hematological: Negative for adenopathy  Does not bruise/bleed easily  Psychiatric/Behavioral: Negative for confusion and sleep disturbance   The patient is not nervous/anxious  ? Patient History:     Past Medical History:   Diagnosis Date    Allergic     Diabetes mellitus ([facility] [address] ) 10/25/2019     No past surgical history on file    Social History     Socioeconomic History    Marital status: /Civil Union     Spouse name: Not on file    Number of children: Not on file    Years of education: Not on file    Highest education level: Not on file   Occupational History    Not on file   Social Needs    Financial resource strain: Not on file    Food insecurity     Worry: Not on file     Inability: Not on file   [employer] needs     Medical: Not on file     Non-medical: Not on file   Tobacco Use    Smoking status: Former Smoker     Packs/day: 0 50     Years: 22 00     Pack years: 11 00     Last attempt to quit: 11/15/2018     Years since quittin 7    Smokeless tobacco: Never Used   Substance and Sexual Activity    Alcohol use: Yes     Frequency: Never     Drinks per session: 1 or 2     Binge frequency: Monthly     Comment: social drinker    Drug use: No    Sexual activity: Yes     Partners: Female     Birth control/protection: Condom Male   Lifestyle    Physical activity     Days per week: Not on file     Minutes per session: Not on file    Stress: Not on file   Relationships    Social connections     Talks on phone: Not on file     Gets together: Not on file     Attends [employer] service: Not on file     Active member of club or organization: Not on file     Attends meetings of clubs or organizations: Not on file     Relationship status: Not on file    Intimate partner violence     Fear of current or ex partner: Not on file     Emotionally abused: Not on file     Physically abused: Not on file     Forced sexual activity: Not on file   Other Topics Concern    Not on file   Social History Narrative    Not on file     Family History   Problem Relation Age of Onset    Hypertension Mother     Diabetes Mother    Keena Kessler Diabetes Father  Dementia Father     Substance Abuse Brother        Current Medications: At the time this note was written these were the medications the patient was on    Current Outpatient Medications   Medication Sig Dispense Refill    Continuous Blood Gluc  (FREESTYLE RUTH 14 DAY READER) ANNABEL Use 1 reader device to check blood sugar 1 Device 0    Continuous Blood Gluc Sensor (FREESTYLE RUTH 14 DAY SENSOR) MISC Use 1 sensor every 14 days to check blood sugar 2 each 12    CVS BISACODYL 5 MG EC tablet TAKE 2 TABLETS (10 MG TOTAL) BY MOUTH ONCE FOR 1 DOSE      CVS Glucose 4-6 GM-MG CHEW 4 TABLETS (16 G TOTAL) AS NEEDED FOR LOW BLOOD SUGAR      GAVILYTE-G 236 g solution TAKE 4,000 ML BY MOUTH ONCE FOR 1 DOSE      glucagon (GLUCAGON EMERGENCY) 1 MG injection Inject 1 mg under the skin once as needed for low blood sugar for up to 1 dose 1 kit 0    glucose 4 g chewable tablet Chew 4 tablets (16 g total) as needed for low blood sugar 100 tablet 3    InPen 100-Blue-Milagros ANNABEL USE INPEN DEVICE TO ADMINISTER HUMALOG INSULIN 1 each 0    INS SYRINGE/NEEDLE 1CC/28G (B-D INS SYR MICROFINE 1CC/28G) 28G X 1/2" 1 ML MISC Use to inject insulin daily (Patient not taking: Reported on 6/22/2020) 100 each 3    insulin degludec Leonel Plum FlexTouch) 100 units/mL injection pen Inject 27 units under the skin every morning 5 pen 3    insulin lispro (HumaLOG KwikPen) 100 units/mL injection pen Use 7 units under the skin with breakfast and 5 units under the skin with lunch and dinner 5 pen 3    Insulin Lispro (HumaLOG) 100 units/mL cartridge for injection Inject 7 units under the skin before breakfast, 5 units under the skin before lunch and dinner using the InPen device 5 Cartridge 3    Insulin Pen Needle (BD Pen Needle Kaykay U/F) 32G X 4 MM MISC Use 4 pen needles a day to administer insulin under the skin 100 each 2    linaCLOtide (Linzess) 290 MCG CAPS Take 1 capsule by mouth daily 30 capsule 5    naproxen (NAPROSYN) 500 mg tablet Every 12 hours as needed for mild-mod pain; take with food (Patient not taking: Reported on 6/22/2020) 30 tablet 0    polyethylene glycol (GOLYTELY) 4000 mL solution Take 4,000 mL by mouth once for 1 dose 4000 mL 0     No current facility-administered medications for this visit  Allergies: Other    Physical Exam:   Vital Signs:   /80   Pulse 97   Temp 98 7 °F (37 1 °C)   Ht 5' 6" (1 676 m)   Wt 66 2 kg (146 lb)   BMI 23 57 kg/m²     Physical Exam  Vitals signs reviewed  Constitutional:       General: He is not in acute distress  Appearance: Normal appearance  He is not ill-appearing, toxic-appearing or diaphoretic  HENT:      Head: Normocephalic and atraumatic  Right Ear: External ear normal       Left Ear: External ear normal       Nose: Nose normal    Eyes:      General: No scleral icterus  Extraocular Movements: Extraocular movements intact  Conjunctiva/sclera: Conjunctivae normal       Pupils: Pupils are equal, round, and reactive to light  Neck:      Musculoskeletal: Normal range of motion and neck supple  No muscular tenderness  Cardiovascular:      Rate and Rhythm: Normal rate and regular rhythm  Heart sounds: Normal heart sounds  No murmur  No friction rub  No gallop  Pulmonary:      Effort: Pulmonary effort is normal  No respiratory distress  Breath sounds: Normal breath sounds  No stridor  No wheezing, rhonchi or rales  Abdominal:      General: Bowel sounds are normal  There is no distension  Palpations: Abdomen is soft  There is no mass  Tenderness: There is no abdominal tenderness  There is no guarding or rebound  Hernia: No hernia is present  Musculoskeletal: Normal range of motion  General: No swelling  Lymphadenopathy:      Cervical: No cervical adenopathy  Skin:     General: Skin is warm and dry  Coloration: Skin is not pale  Findings: No erythema or rash     Neurological:      General: No focal deficit present  Mental Status: He is alert and oriented to person, place, and time  Psychiatric:         Mood and Affect: Mood normal          Behavior: Behavior normal          Thought Content:  Thought content normal          Judgment: Judgment normal           Labs and Imaging:    No recent labs

## 2020-08-21 ENCOUNTER — TELEPHONE (OUTPATIENT)
Dept: ENDOCRINOLOGY | Facility: CLINIC | Age: 40
End: 2020-08-21

## 2020-08-24 ENCOUNTER — TELEPHONE (OUTPATIENT)
Dept: ENDOCRINOLOGY | Facility: CLINIC | Age: 40
End: 2020-08-24

## 2020-08-24 NOTE — TELEPHONE ENCOUNTER
Left message with inpen information and the doctors recommendations  Asked that he call if he has any questions or concerns

## 2020-08-24 NOTE — TELEPHONE ENCOUNTER
Please let pt know I reviewed inpen report    BGs look much better, only saw one true low BG 53    Please let me know if happens more frequently because then we can adjust doses sooner

## 2020-08-27 ENCOUNTER — TELEPHONE (OUTPATIENT)
Dept: ENDOCRINOLOGY | Facility: CLINIC | Age: 40
End: 2020-08-27

## 2020-08-27 NOTE — TELEPHONE ENCOUNTER
Please let pt know I reviewed Taj CGM report    -had a few low BGs in early august but looks like things are more stable with no further hypoglycemia the past 1-2 weeks    Continue same regimen for now

## 2020-09-10 ENCOUNTER — TELEPHONE (OUTPATIENT)
Dept: ENDOCRINOLOGY | Facility: CLINIC | Age: 40
End: 2020-09-10

## 2020-09-10 NOTE — TELEPHONE ENCOUNTER
Please let pt know I reviewed inpen report     BGs look stable, one reading low 62 after a meal and another low 43, 27 after a meal    Let's increase your Insulin Sensitivity Factor (ISF) to 50 (from 45)---if you need help doing this on the InPen device let me know and I can have Violette the rep help you     This will give you slightly less correction insulin for a high BG and prevent lows post meals

## 2020-09-14 ENCOUNTER — TELEPHONE (OUTPATIENT)
Dept: ENDOCRINOLOGY | Facility: CLINIC | Age: 40
End: 2020-09-14

## 2020-09-15 ENCOUNTER — TELEPHONE (OUTPATIENT)
Dept: ENDOCRINOLOGY | Facility: CLINIC | Age: 40
End: 2020-09-15

## 2020-09-15 NOTE — TELEPHONE ENCOUNTER
Please let pt know I reviewed Taj CGM report    -BGs look pretty good    Only 2 episodes of low BGs after meals- maybe he over estimated the number of carbs with those meals    Continue same regimen for now

## 2020-09-22 ENCOUNTER — TELEPHONE (OUTPATIENT)
Dept: ENDOCRINOLOGY | Facility: CLINIC | Age: 40
End: 2020-09-22

## 2020-09-22 NOTE — TELEPHONE ENCOUNTER
Please let pt know I reviewed Taj CGM report     -BGs look pretty good     Only 1 low BG looks like after correcting for a high 347 in the morning of Sept 12th with 7 units humalog     Continue same regimen for now

## 2020-09-30 ENCOUNTER — APPOINTMENT (OUTPATIENT)
Dept: LAB | Facility: CLINIC | Age: 40
End: 2020-09-30
Payer: COMMERCIAL

## 2020-09-30 DIAGNOSIS — E10.65 TYPE 1 DIABETES MELLITUS WITH HYPERGLYCEMIA (HCC): ICD-10-CM

## 2020-09-30 LAB
ALBUMIN SERPL BCP-MCNC: 3.8 G/DL (ref 3.5–5)
ALP SERPL-CCNC: 73 U/L (ref 46–116)
ALT SERPL W P-5'-P-CCNC: 21 U/L (ref 12–78)
ANION GAP SERPL CALCULATED.3IONS-SCNC: 3 MMOL/L (ref 4–13)
AST SERPL W P-5'-P-CCNC: 10 U/L (ref 5–45)
BILIRUB SERPL-MCNC: 0.49 MG/DL (ref 0.2–1)
BUN SERPL-MCNC: 9 MG/DL (ref 5–25)
CALCIUM SERPL-MCNC: 9.5 MG/DL (ref 8.3–10.1)
CHLORIDE SERPL-SCNC: 108 MMOL/L (ref 100–108)
CHOLEST SERPL-MCNC: 175 MG/DL (ref 50–200)
CO2 SERPL-SCNC: 28 MMOL/L (ref 21–32)
CREAT SERPL-MCNC: 0.65 MG/DL (ref 0.6–1.3)
EST. AVERAGE GLUCOSE BLD GHB EST-MCNC: 148 MG/DL
GFR SERPL CREATININE-BSD FRML MDRD: 123 ML/MIN/1.73SQ M
GLUCOSE P FAST SERPL-MCNC: 170 MG/DL (ref 65–99)
HBA1C MFR BLD: 6.8 %
HDLC SERPL-MCNC: 77 MG/DL
LDLC SERPL CALC-MCNC: 73 MG/DL (ref 0–100)
LEFT EYE DIABETIC RETINOPATHY: NORMAL
NONHDLC SERPL-MCNC: 98 MG/DL
POTASSIUM SERPL-SCNC: 4.3 MMOL/L (ref 3.5–5.3)
PROT SERPL-MCNC: 7.8 G/DL (ref 6.4–8.2)
RIGHT EYE DIABETIC RETINOPATHY: NORMAL
SODIUM SERPL-SCNC: 139 MMOL/L (ref 136–145)
TRIGL SERPL-MCNC: 127 MG/DL

## 2020-09-30 PROCEDURE — 83036 HEMOGLOBIN GLYCOSYLATED A1C: CPT

## 2020-09-30 PROCEDURE — 80053 COMPREHEN METABOLIC PANEL: CPT

## 2020-09-30 PROCEDURE — 80061 LIPID PANEL: CPT

## 2020-09-30 PROCEDURE — 3044F HG A1C LEVEL LT 7.0%: CPT | Performed by: INTERNAL MEDICINE

## 2020-09-30 PROCEDURE — 36415 COLL VENOUS BLD VENIPUNCTURE: CPT

## 2020-10-07 ENCOUNTER — OFFICE VISIT (OUTPATIENT)
Dept: ENDOCRINOLOGY | Facility: CLINIC | Age: 40
End: 2020-10-07
Payer: COMMERCIAL

## 2020-10-07 VITALS
HEIGHT: 66 IN | SYSTOLIC BLOOD PRESSURE: 122 MMHG | DIASTOLIC BLOOD PRESSURE: 86 MMHG | HEART RATE: 102 BPM | BODY MASS INDEX: 25.07 KG/M2 | TEMPERATURE: 96.6 F | WEIGHT: 156 LBS

## 2020-10-07 DIAGNOSIS — E10.65 TYPE 1 DIABETES MELLITUS WITH HYPERGLYCEMIA (HCC): Primary | ICD-10-CM

## 2020-10-07 DIAGNOSIS — E13.9 LATENT AUTOIMMUNE DIABETES IN ADULTS (LADA), MANAGED AS TYPE 1 (HCC): ICD-10-CM

## 2020-10-07 PROCEDURE — 95251 CONT GLUC MNTR ANALYSIS I&R: CPT | Performed by: INTERNAL MEDICINE

## 2020-10-07 PROCEDURE — 99214 OFFICE O/P EST MOD 30 MIN: CPT | Performed by: INTERNAL MEDICINE

## 2020-10-07 PROCEDURE — 1036F TOBACCO NON-USER: CPT | Performed by: INTERNAL MEDICINE

## 2020-10-22 LAB
LEFT EYE DIABETIC RETINOPATHY: NORMAL
RIGHT EYE DIABETIC RETINOPATHY: NORMAL

## 2020-10-22 PROCEDURE — 2022F DILAT RTA XM EVC RTNOPTHY: CPT | Performed by: INTERNAL MEDICINE

## 2020-10-23 PROBLEM — E10.3299 MILD NONPROLIFERATIVE DIABETIC RETINOPATHY ASSOCIATED WITH TYPE 1 DIABETES MELLITUS (HCC): Status: ACTIVE | Noted: 2020-10-23

## 2020-10-27 DIAGNOSIS — E10.65 TYPE 1 DIABETES MELLITUS WITH HYPERGLYCEMIA (HCC): ICD-10-CM

## 2020-10-27 RX ORDER — PEN NEEDLE, DIABETIC 32GX 5/32"
NEEDLE, DISPOSABLE MISCELLANEOUS
Qty: 100 EACH | Refills: 2 | Status: SHIPPED | OUTPATIENT
Start: 2020-10-27 | End: 2021-01-18 | Stop reason: SDUPTHER

## 2020-11-03 ENCOUNTER — TELEPHONE (OUTPATIENT)
Dept: ENDOCRINOLOGY | Facility: CLINIC | Age: 40
End: 2020-11-03

## 2020-11-17 ENCOUNTER — TELEPHONE (OUTPATIENT)
Dept: ENDOCRINOLOGY | Facility: CLINIC | Age: 40
End: 2020-11-17

## 2020-12-13 ENCOUNTER — TELEPHONE (OUTPATIENT)
Dept: ENDOCRINOLOGY | Facility: CLINIC | Age: 40
End: 2020-12-13

## 2021-01-05 ENCOUNTER — TELEPHONE (OUTPATIENT)
Dept: ENDOCRINOLOGY | Facility: CLINIC | Age: 41
End: 2021-01-05

## 2021-01-05 NOTE — TELEPHONE ENCOUNTER
Please let patient know I reviewed his inpen report, his blood sugars at the end of December were stable, he had a few high blood sugar readings in the middle of December though    Continue same regimen for now

## 2021-01-18 DIAGNOSIS — E10.65 TYPE 1 DIABETES MELLITUS WITH HYPERGLYCEMIA (HCC): ICD-10-CM

## 2021-01-18 RX ORDER — PEN NEEDLE, DIABETIC 32GX 5/32"
NEEDLE, DISPOSABLE MISCELLANEOUS
Qty: 100 EACH | Refills: 2 | Status: SHIPPED | OUTPATIENT
Start: 2021-01-18 | End: 2021-05-11 | Stop reason: SDUPTHER

## 2021-02-03 ENCOUNTER — TELEPHONE (OUTPATIENT)
Dept: OTHER | Facility: HOSPITAL | Age: 41
End: 2021-02-03

## 2021-02-03 NOTE — TELEPHONE ENCOUNTER
Please let patient know I reviewed his inpen report, his blood sugars look stable     Continue same regimen for now

## 2021-02-24 ENCOUNTER — OFFICE VISIT (OUTPATIENT)
Dept: ENDOCRINOLOGY | Facility: CLINIC | Age: 41
End: 2021-02-24
Payer: COMMERCIAL

## 2021-02-24 VITALS
BODY MASS INDEX: 27.32 KG/M2 | HEART RATE: 87 BPM | SYSTOLIC BLOOD PRESSURE: 118 MMHG | HEIGHT: 66 IN | DIASTOLIC BLOOD PRESSURE: 86 MMHG | TEMPERATURE: 97 F | WEIGHT: 170 LBS

## 2021-02-24 DIAGNOSIS — E13.9 LATENT AUTOIMMUNE DIABETES IN ADULTS (LADA), MANAGED AS TYPE 1 (HCC): ICD-10-CM

## 2021-02-24 DIAGNOSIS — E13.9 LADA (LATENT AUTOIMMUNE DIABETES IN ADULTS), MANAGED AS TYPE 1 (HCC): ICD-10-CM

## 2021-02-24 DIAGNOSIS — E10.65 TYPE 1 DIABETES MELLITUS WITH HYPERGLYCEMIA (HCC): Primary | ICD-10-CM

## 2021-02-24 PROCEDURE — 1036F TOBACCO NON-USER: CPT | Performed by: INTERNAL MEDICINE

## 2021-02-24 PROCEDURE — 3008F BODY MASS INDEX DOCD: CPT | Performed by: INTERNAL MEDICINE

## 2021-02-24 PROCEDURE — 99214 OFFICE O/P EST MOD 30 MIN: CPT | Performed by: INTERNAL MEDICINE

## 2021-02-24 RX ORDER — INSULIN DEGLUDEC INJECTION 100 U/ML
INJECTION, SOLUTION SUBCUTANEOUS
Qty: 10 PEN | Refills: 3 | Status: SHIPPED | OUTPATIENT
Start: 2021-02-24 | End: 2021-09-02 | Stop reason: SDUPTHER

## 2021-03-03 ENCOUNTER — LAB (OUTPATIENT)
Dept: LAB | Facility: CLINIC | Age: 41
End: 2021-03-03
Payer: COMMERCIAL

## 2021-03-03 DIAGNOSIS — E10.65 TYPE 1 DIABETES MELLITUS WITH HYPERGLYCEMIA (HCC): ICD-10-CM

## 2021-03-03 DIAGNOSIS — E13.9 LADA (LATENT AUTOIMMUNE DIABETES IN ADULTS), MANAGED AS TYPE 1 (HCC): ICD-10-CM

## 2021-03-03 DIAGNOSIS — E13.9 LATENT AUTOIMMUNE DIABETES IN ADULTS (LADA), MANAGED AS TYPE 1 (HCC): ICD-10-CM

## 2021-03-03 LAB
ALBUMIN SERPL BCP-MCNC: 3.5 G/DL (ref 3.5–5)
ALP SERPL-CCNC: 73 U/L (ref 46–116)
ALT SERPL W P-5'-P-CCNC: 21 U/L (ref 12–78)
ANION GAP SERPL CALCULATED.3IONS-SCNC: 4 MMOL/L (ref 4–13)
AST SERPL W P-5'-P-CCNC: 15 U/L (ref 5–45)
BILIRUB SERPL-MCNC: 0.41 MG/DL (ref 0.2–1)
BUN SERPL-MCNC: 12 MG/DL (ref 5–25)
CALCIUM SERPL-MCNC: 8.9 MG/DL (ref 8.3–10.1)
CHLORIDE SERPL-SCNC: 106 MMOL/L (ref 100–108)
CHOLEST SERPL-MCNC: 160 MG/DL (ref 50–200)
CO2 SERPL-SCNC: 29 MMOL/L (ref 21–32)
CREAT SERPL-MCNC: 0.61 MG/DL (ref 0.6–1.3)
CREAT UR-MCNC: 218 MG/DL
EST. AVERAGE GLUCOSE BLD GHB EST-MCNC: 163 MG/DL
GFR SERPL CREATININE-BSD FRML MDRD: 126 ML/MIN/1.73SQ M
GLUCOSE P FAST SERPL-MCNC: 167 MG/DL (ref 65–99)
HBA1C MFR BLD: 7.3 %
HDLC SERPL-MCNC: 68 MG/DL
LDLC SERPL CALC-MCNC: 78 MG/DL (ref 0–100)
MICROALBUMIN UR-MCNC: 27.3 MG/L (ref 0–20)
MICROALBUMIN/CREAT 24H UR: 13 MG/G CREATININE (ref 0–30)
NONHDLC SERPL-MCNC: 92 MG/DL
POTASSIUM SERPL-SCNC: 4.1 MMOL/L (ref 3.5–5.3)
PROT SERPL-MCNC: 7.1 G/DL (ref 6.4–8.2)
SODIUM SERPL-SCNC: 139 MMOL/L (ref 136–145)
T4 FREE SERPL-MCNC: 1.05 NG/DL (ref 0.76–1.46)
TRIGL SERPL-MCNC: 72 MG/DL
TSH SERPL DL<=0.05 MIU/L-ACNC: 1.41 UIU/ML (ref 0.36–3.74)

## 2021-03-03 PROCEDURE — 3051F HG A1C>EQUAL 7.0%<8.0%: CPT | Performed by: INTERNAL MEDICINE

## 2021-03-03 PROCEDURE — 80061 LIPID PANEL: CPT

## 2021-03-03 PROCEDURE — 80053 COMPREHEN METABOLIC PANEL: CPT

## 2021-03-03 PROCEDURE — 36415 COLL VENOUS BLD VENIPUNCTURE: CPT

## 2021-03-03 PROCEDURE — 84439 ASSAY OF FREE THYROXINE: CPT

## 2021-03-03 PROCEDURE — 84443 ASSAY THYROID STIM HORMONE: CPT

## 2021-03-03 PROCEDURE — 82570 ASSAY OF URINE CREATININE: CPT

## 2021-03-03 PROCEDURE — 83036 HEMOGLOBIN GLYCOSYLATED A1C: CPT

## 2021-03-03 PROCEDURE — 82043 UR ALBUMIN QUANTITATIVE: CPT

## 2021-03-03 PROCEDURE — 3061F NEG MICROALBUMINURIA REV: CPT | Performed by: INTERNAL MEDICINE

## 2021-03-04 ENCOUNTER — TELEPHONE (OUTPATIENT)
Dept: ENDOCRINOLOGY | Facility: CLINIC | Age: 41
End: 2021-03-04

## 2021-04-02 ENCOUNTER — TELEPHONE (OUTPATIENT)
Dept: ENDOCRINOLOGY | Facility: CLINIC | Age: 41
End: 2021-04-02

## 2021-04-05 DIAGNOSIS — Z23 ENCOUNTER FOR IMMUNIZATION: ICD-10-CM

## 2021-04-12 DIAGNOSIS — E13.9 LATENT AUTOIMMUNE DIABETES IN ADULTS (LADA), MANAGED AS TYPE 1 (HCC): ICD-10-CM

## 2021-04-12 NOTE — TELEPHONE ENCOUNTER
Pt called stating he wants to speak with the physician directly (Dr Muriel Charles)     He wants to also have Freestyle Taj 14 day Sensor refilled at his [facility] pharmacy on file! I informed patient I would sent this over to the office  He would like a call back sooner than later

## 2021-04-13 ENCOUNTER — TELEPHONE (OUTPATIENT)
Dept: ENDOCRINOLOGY | Facility: CLINIC | Age: 41
End: 2021-04-13

## 2021-04-13 DIAGNOSIS — Z78.9 TRANSGENDER: Primary | ICD-10-CM

## 2021-04-13 RX ORDER — FLASH GLUCOSE SENSOR
KIT MISCELLANEOUS
Qty: 6 EACH | Refills: 1 | Status: SHIPPED | OUTPATIENT
Start: 2021-04-13 | End: 2022-01-03 | Stop reason: SDUPTHER

## 2021-04-13 NOTE — TELEPHONE ENCOUNTER
Spoke with patient to see if I could relay a message to the doctor, however he wants to personally speak with the doctor

## 2021-04-13 NOTE — TELEPHONE ENCOUNTER
Spoke with patient and asked him to contact  L  [facility] office to obtain a document requesting medical records from us  Faxed referral to her office

## 2021-04-13 NOTE — TELEPHONE ENCOUNTER
Spoke with patient- needs referral for transgender care at [address] referral for Dr Evangelina Kawasaki     **please mail referral slip and medical records to [phone]    Please have Kyara have patient sign medical release

## 2021-05-11 DIAGNOSIS — E10.65 TYPE 1 DIABETES MELLITUS WITH HYPERGLYCEMIA (HCC): ICD-10-CM

## 2021-05-11 RX ORDER — PEN NEEDLE, DIABETIC 32GX 5/32"
NEEDLE, DISPOSABLE MISCELLANEOUS
Qty: 100 EACH | Refills: 2 | Status: SHIPPED | OUTPATIENT
Start: 2021-05-11 | End: 2021-09-21

## 2021-06-17 ENCOUNTER — OFFICE VISIT (OUTPATIENT)
Dept: ENDOCRINOLOGY | Facility: CLINIC | Age: 41
End: 2021-06-17
Payer: COMMERCIAL

## 2021-06-17 VITALS
TEMPERATURE: 98.4 F | HEIGHT: 66 IN | SYSTOLIC BLOOD PRESSURE: 120 MMHG | WEIGHT: 144.5 LBS | BODY MASS INDEX: 23.22 KG/M2 | DIASTOLIC BLOOD PRESSURE: 88 MMHG | HEART RATE: 90 BPM

## 2021-06-17 DIAGNOSIS — Z78.9 TRANSGENDER: ICD-10-CM

## 2021-06-17 DIAGNOSIS — E13.9 LADA (LATENT AUTOIMMUNE DIABETES IN ADULTS), MANAGED AS TYPE 1 (HCC): ICD-10-CM

## 2021-06-17 DIAGNOSIS — E10.65 TYPE 1 DIABETES MELLITUS WITH HYPERGLYCEMIA (HCC): Primary | ICD-10-CM

## 2021-06-17 PROCEDURE — 99214 OFFICE O/P EST MOD 30 MIN: CPT | Performed by: INTERNAL MEDICINE

## 2021-06-17 PROCEDURE — 1036F TOBACCO NON-USER: CPT | Performed by: INTERNAL MEDICINE

## 2021-06-17 PROCEDURE — 3008F BODY MASS INDEX DOCD: CPT | Performed by: INTERNAL MEDICINE

## 2021-06-17 NOTE — PROGRESS NOTES
ENDOCRINOLOGY  FOLLOW UP VISIT      Reason for Endocrine Consult/Chief Complaint: DM follow up    ? Medical Decision Making:     Impression  1  SIA  2  Urinary hesitancy- now resolved  3  Constipation - now resolved  4  Left anterior foot pain- seeing podiatry, juan manuel's neuroma s/p steroid injection   5  Interested in [address] [place] [address]     Recommendations:     Has been having significant morning and daytime hyperglycemia  Will increase Tresiba to 30 units qAM and adjusted InPen settings to the following to give more mealtime and correctional insulin:     InPen device for mealtime insulin based on meal setting  ISF 35, target , max dose 10 units, AIT 4hrs  Changed meal estimation standing dose to low carb 4 units, medium carb 5 units, high carb 6 units  Counseled on how to treat hypoglycemia  Instructed to let me know if hypoglycemia starts to develop for further adjustment of insulin doses         Due for all diabetes labs now      Interested in Transgender care- will be seeing [facility]        A total of 35 minutes were spent during this encounter including taking history, performing physical exam, discussing therapy options, reviewing CGM reports, charting       Return to clinic in 3 months   Richie AMES            History of Present Illness:   Mr Benito Genao is a 45-year-old male who presents for diabetes Lonny Robinsons was hospitalized in October 2019 with elevated blood sugars   He was seen by the Endocrinology service and autoantibodies were checked   His IA -2 antibody was positive  ?   PMH-SIA  PSH-none known  FHx-father with diabetes type 2  SHx-quit smoking 1 year ago, social ETOH, work 2 jobs [employer]      Type of DM:  SIA  Age of onset:  October 2019  Microvascular complications: none known   Macrovascular complications: none known            Events since last visit:     On tresiba 27 units qAM and humalog with InPen device based on meal setting with breakfast/lunch/dinner  on average taking 7 units with meals      No significant hypoglycemia        ? Review of Systems:     Review of Systems   Constitutional: Negative for appetite change, chills, diaphoresis, fatigue, fever and unexpected weight change  HENT: Negative for congestion, ear pain, hearing loss, rhinorrhea, sinus pressure, sinus pain, sore throat, trouble swallowing and voice change  Eyes: Negative for photophobia, redness and visual disturbance  Respiratory: Negative for apnea, cough, chest tightness, shortness of breath, wheezing and stridor  Cardiovascular: Negative for chest pain, palpitations and leg swelling  Gastrointestinal: Negative for abdominal distention, abdominal pain, constipation, diarrhea, nausea and vomiting  Endocrine: Negative for cold intolerance, heat intolerance, polydipsia, polyphagia and polyuria  Genitourinary: Negative for difficulty urinating, dysuria, flank pain, frequency, hematuria and urgency  Musculoskeletal: Negative for arthralgias, back pain, gait problem, joint swelling and myalgias  Skin: Negative for color change, pallor, rash and wound  Allergic/Immunologic: Negative for immunocompromised state  Neurological: Negative for dizziness, tremors, syncope, weakness, light-headedness and headaches  Hematological: Negative for adenopathy  Does not bruise/bleed easily  Psychiatric/Behavioral: Negative for confusion and sleep disturbance  The patient is not nervous/anxious  ? Patient History:     Past Medical History:   Diagnosis Date    Allergic     Diabetes mellitus ([facility] ) 10/25/2019     No past surgical history on file    Social History     Socioeconomic History    Marital status: /Civil Union     Spouse name: Not on file    Number of children: Not on file    Years of education: Not on file    Highest education level: Not on file   Occupational History    Not on file   Tobacco Use    Smoking status: Former Smoker     Packs/day: 0 50     Years: 22 00     Pack years: 11 00     Quit date: 11/15/2018     Years since quittin 5    Smokeless tobacco: Never Used   Vaping Use    Vaping Use: Never used   Substance and Sexual Activity    Alcohol use: Yes     Comment: social drinker    Drug use: No    Sexual activity: Yes     Partners: Female     Birth control/protection: Condom Male   Other Topics Concern    Not on file   Social History Narrative    Not on file     Social Determinants of Health     Financial Resource Strain:     Difficulty of Paying Living Expenses:    Food Insecurity:     Worried About Running Out of Food in the Last Year:     Ran Out of Food in the Last Year:    Transportation Needs:     Lack of Transportation (Medical):  Lack of Transportation (Non-Medical):    Physical Activity:     Days of Exercise per Week:     Minutes of Exercise per Session:    Stress:     Feeling of Stress :    Social Connections:     Frequency of Communication with Friends and Family:     Frequency of Social Gatherings with Friends and Family:     Attends Orthodoxy Services:     Active Member of Clubs or Organizations:     Attends Club or Organization Meetings:     Marital Status:    Intimate Partner Violence:     Fear of Current or Ex-Partner:     Emotionally Abused:     Physically Abused:     Sexually Abused:      Family History   Problem Relation Age of Onset    Hypertension Mother     Diabetes Mother     Diabetes Father     Dementia Father     Substance Abuse Brother        Current Medications: At the time this note was written these were the medications the patient was on    Current Outpatient Medications   Medication Sig Dispense Refill    Continuous Blood Gluc  (FREESTYLE RUTH 14 DAY READER) ANNABEL Use 1 reader device to check blood sugar 1 Device 0    Continuous Blood Gluc Sensor (FreeStyle Ruth 14 Day Sensor) MISC Use 1 sensor every 14 days to check blood sugar 6 each 1    CVS BISACODYL 5 MG EC tablet TAKE 2 TABLETS (10 MG TOTAL) BY MOUTH ONCE FOR 1 DOSE      CVS Glucose 4-6 GM-MG CHEW 4 TABLETS (16 G TOTAL) AS NEEDED FOR LOW BLOOD SUGAR      GAVILYTE-G 236 g solution TAKE 4,000 ML BY MOUTH ONCE FOR 1 DOSE      glucagon (GLUCAGON EMERGENCY) 1 MG injection Inject 1 mg under the skin once as needed for low blood sugar for up to 1 dose 1 kit 0    glucose 4 g chewable tablet Chew 4 tablets (16 g total) as needed for low blood sugar 100 tablet 3    Injection Device for Insulin (InPen 100-Blue-Milagros) ANNABEL Use inpen device to administer Humalog insulin 1 each 0    INS SYRINGE/NEEDLE 1CC/28G (B-D INS SYR MICROFINE 1CC/28G) 28G X 1/2" 1 ML MISC Use to inject insulin daily (Patient not taking: Reported on 6/22/2020) 100 each 3    insulin degludec Hilary Roses FlexTouch) 100 units/mL injection pen Inject 27 units under the skin every morning 10 pen 3    insulin lispro (HumaLOG KwikPen) 100 units/mL injection pen Use 7 units under the skin with breakfast and 5 units under the skin with lunch and dinner 5 pen 3    Insulin Lispro (HumaLOG) 100 units/mL cartridge for injection Inject 7 units under the skin before breakfast, 5 units under the skin before lunch and dinner using the InPen device 5 Cartridge 3    Insulin Pen Needle (BD Pen Needle Kaykay U/F) 32G X 4 MM MISC Use 4 pen needles a day to administer insulin under the skin 100 each 2    linaCLOtide (Linzess) 290 MCG CAPS Take 1 capsule by mouth daily 30 capsule 5    naproxen (NAPROSYN) 500 mg tablet Every 12 hours as needed for mild-mod pain; take with food (Patient not taking: Reported on 6/22/2020) 30 tablet 0     No current facility-administered medications for this visit  Allergies: Other    Physical Exam:   Vital Signs:   /88   Pulse 90   Temp 98 4 °F (36 9 °C)   Ht 5' 6" (1 676 m)   Wt 65 5 kg (144 lb 8 oz)   BMI 23 32 kg/m²     Physical Exam  Vitals reviewed     Constitutional:       General: He is not in acute distress  Appearance: Normal appearance  He is not ill-appearing, toxic-appearing or diaphoretic  HENT:      Head: Normocephalic and atraumatic  Right Ear: External ear normal       Left Ear: External ear normal       Nose: Nose normal    Eyes:      General: No scleral icterus  Extraocular Movements: Extraocular movements intact  Conjunctiva/sclera: Conjunctivae normal    Neck:      Comments: No thyromegaly or nodules  Cardiovascular:      Rate and Rhythm: Normal rate and regular rhythm  Heart sounds: Normal heart sounds  No murmur heard  No friction rub  No gallop  Pulmonary:      Effort: Pulmonary effort is normal  No respiratory distress  Breath sounds: Normal breath sounds  No stridor  No wheezing, rhonchi or rales  Abdominal:      General: Bowel sounds are normal  There is no distension  Palpations: Abdomen is soft  There is no mass  Tenderness: There is no abdominal tenderness  There is no guarding or rebound  Hernia: No hernia is present  Musculoskeletal:         General: No swelling  Normal range of motion  Cervical back: Normal range of motion and neck supple  No tenderness  Lymphadenopathy:      Cervical: No cervical adenopathy  Skin:     General: Skin is warm and dry  Coloration: Skin is not pale  Findings: No erythema or rash  Neurological:      General: No focal deficit present  Mental Status: He is alert and oriented to person, place, and time  Psychiatric:         Mood and Affect: Mood normal          Behavior: Behavior normal          Thought Content:  Thought content normal          Judgment: Judgment normal           Labs and Imaging:    No recent labs

## 2021-07-07 ENCOUNTER — APPOINTMENT (OUTPATIENT)
Dept: LAB | Facility: CLINIC | Age: 41
End: 2021-07-07
Payer: COMMERCIAL

## 2021-07-07 DIAGNOSIS — E13.9 LADA (LATENT AUTOIMMUNE DIABETES IN ADULTS), MANAGED AS TYPE 1 (HCC): ICD-10-CM

## 2021-07-07 DIAGNOSIS — E10.65 TYPE 1 DIABETES MELLITUS WITH HYPERGLYCEMIA (HCC): ICD-10-CM

## 2021-07-07 LAB
ALBUMIN SERPL BCP-MCNC: 3.7 G/DL (ref 3.5–5)
ALP SERPL-CCNC: 71 U/L (ref 46–116)
ALT SERPL W P-5'-P-CCNC: 17 U/L (ref 12–78)
ANION GAP SERPL CALCULATED.3IONS-SCNC: 5 MMOL/L (ref 4–13)
AST SERPL W P-5'-P-CCNC: 11 U/L (ref 5–45)
BILIRUB SERPL-MCNC: 0.74 MG/DL (ref 0.2–1)
BUN SERPL-MCNC: 14 MG/DL (ref 5–25)
CALCIUM SERPL-MCNC: 9.3 MG/DL (ref 8.3–10.1)
CHLORIDE SERPL-SCNC: 104 MMOL/L (ref 100–108)
CHOLEST SERPL-MCNC: 207 MG/DL (ref 50–200)
CO2 SERPL-SCNC: 27 MMOL/L (ref 21–32)
CREAT SERPL-MCNC: 0.77 MG/DL (ref 0.6–1.3)
EST. AVERAGE GLUCOSE BLD GHB EST-MCNC: 200 MG/DL
GFR SERPL CREATININE-BSD FRML MDRD: 114 ML/MIN/1.73SQ M
GLUCOSE P FAST SERPL-MCNC: 182 MG/DL (ref 65–99)
HBA1C MFR BLD: 8.6 %
HDLC SERPL-MCNC: 84 MG/DL
LDLC SERPL CALC-MCNC: 91 MG/DL (ref 0–100)
NONHDLC SERPL-MCNC: 123 MG/DL
POTASSIUM SERPL-SCNC: 3.9 MMOL/L (ref 3.5–5.3)
PROT SERPL-MCNC: 8.1 G/DL (ref 6.4–8.2)
SODIUM SERPL-SCNC: 136 MMOL/L (ref 136–145)
TRIGL SERPL-MCNC: 161 MG/DL

## 2021-07-07 PROCEDURE — 84681 ASSAY OF C-PEPTIDE: CPT

## 2021-07-07 PROCEDURE — 3052F HG A1C>EQUAL 8.0%<EQUAL 9.0%: CPT | Performed by: INTERNAL MEDICINE

## 2021-07-07 PROCEDURE — 36415 COLL VENOUS BLD VENIPUNCTURE: CPT

## 2021-07-07 PROCEDURE — 83036 HEMOGLOBIN GLYCOSYLATED A1C: CPT

## 2021-07-07 PROCEDURE — 80061 LIPID PANEL: CPT

## 2021-07-07 PROCEDURE — 80053 COMPREHEN METABOLIC PANEL: CPT

## 2021-07-08 LAB — C PEPTIDE SERPL-MCNC: 0.5 NG/ML (ref 1.1–4.4)

## 2021-07-09 ENCOUNTER — TELEPHONE (OUTPATIENT)
Dept: ENDOCRINOLOGY | Facility: CLINIC | Age: 41
End: 2021-07-09

## 2021-07-14 ENCOUNTER — TELEPHONE (OUTPATIENT)
Dept: ENDOCRINOLOGY | Facility: CLINIC | Age: 41
End: 2021-07-14

## 2021-08-13 DIAGNOSIS — E10.65 TYPE 1 DIABETES MELLITUS WITH HYPERGLYCEMIA (HCC): ICD-10-CM

## 2021-08-13 DIAGNOSIS — E13.9 LATENT AUTOIMMUNE DIABETES IN ADULTS (LADA), MANAGED AS TYPE 1 (HCC): ICD-10-CM

## 2021-08-13 DIAGNOSIS — E13.9 LADA (LATENT AUTOIMMUNE DIABETES IN ADULTS), MANAGED AS TYPE 1 (HCC): ICD-10-CM

## 2021-08-13 RX ORDER — INSULIN LISPRO 100 [IU]/ML
INJECTION, SOLUTION INTRAVENOUS; SUBCUTANEOUS
Qty: 15 ML | Refills: 0 | Status: SHIPPED | OUTPATIENT
Start: 2021-08-13 | End: 2021-11-08

## 2021-09-01 DIAGNOSIS — E13.9 LATENT AUTOIMMUNE DIABETES IN ADULTS (LADA), MANAGED AS TYPE 1 (HCC): ICD-10-CM

## 2021-09-01 DIAGNOSIS — E10.65 TYPE 1 DIABETES MELLITUS WITH HYPERGLYCEMIA (HCC): ICD-10-CM

## 2021-09-01 RX ORDER — INSULIN DEGLUDEC INJECTION 100 U/ML
INJECTION, SOLUTION SUBCUTANEOUS
Refills: 0 | Status: CANCELLED | OUTPATIENT
Start: 2021-09-01

## 2021-09-02 RX ORDER — INSULIN DEGLUDEC INJECTION 100 U/ML
INJECTION, SOLUTION SUBCUTANEOUS
Qty: 30 ML | Refills: 1 | Status: SHIPPED | OUTPATIENT
Start: 2021-09-02 | End: 2022-04-07 | Stop reason: SDUPTHER

## 2021-10-28 ENCOUNTER — TELEPHONE (OUTPATIENT)
Dept: ENDOCRINOLOGY | Facility: CLINIC | Age: 41
End: 2021-10-28

## 2021-11-02 ENCOUNTER — APPOINTMENT (OUTPATIENT)
Dept: LAB | Facility: CLINIC | Age: 41
End: 2021-11-02
Payer: COMMERCIAL

## 2021-11-02 DIAGNOSIS — E10.65 TYPE 1 DIABETES MELLITUS WITH HYPERGLYCEMIA (HCC): ICD-10-CM

## 2021-11-02 DIAGNOSIS — F64.9 GENDER IDENTITY DISORDER: ICD-10-CM

## 2021-11-02 DIAGNOSIS — E13.9 LADA (LATENT AUTOIMMUNE DIABETES IN ADULTS), MANAGED AS TYPE 1 (HCC): ICD-10-CM

## 2021-11-02 LAB
ANION GAP SERPL CALCULATED.3IONS-SCNC: 4 MMOL/L (ref 4–13)
BUN SERPL-MCNC: 17 MG/DL (ref 5–25)
CALCIUM SERPL-MCNC: 9.8 MG/DL (ref 8.3–10.1)
CHLORIDE SERPL-SCNC: 105 MMOL/L (ref 100–108)
CHOLEST SERPL-MCNC: 168 MG/DL (ref 50–200)
CO2 SERPL-SCNC: 29 MMOL/L (ref 21–32)
CREAT SERPL-MCNC: 0.65 MG/DL (ref 0.6–1.3)
CREAT UR-MCNC: 129 MG/DL
EST. AVERAGE GLUCOSE BLD GHB EST-MCNC: 183 MG/DL
ESTRADIOL SERPL-MCNC: 238 PG/ML (ref 11–52.5)
GFR SERPL CREATININE-BSD FRML MDRD: 122 ML/MIN/1.73SQ M
GLUCOSE P FAST SERPL-MCNC: 74 MG/DL (ref 65–99)
HBA1C MFR BLD: 8 %
HDLC SERPL-MCNC: 84 MG/DL
LDLC SERPL CALC-MCNC: 64 MG/DL (ref 0–100)
MICROALBUMIN UR-MCNC: 11.3 MG/L (ref 0–20)
MICROALBUMIN/CREAT 24H UR: 9 MG/G CREATININE (ref 0–30)
NONHDLC SERPL-MCNC: 84 MG/DL
POTASSIUM SERPL-SCNC: 4.2 MMOL/L (ref 3.5–5.3)
SODIUM SERPL-SCNC: 138 MMOL/L (ref 136–145)
TRIGL SERPL-MCNC: 99 MG/DL

## 2021-11-02 PROCEDURE — 80061 LIPID PANEL: CPT

## 2021-11-02 PROCEDURE — 82670 ASSAY OF TOTAL ESTRADIOL: CPT

## 2021-11-02 PROCEDURE — 36415 COLL VENOUS BLD VENIPUNCTURE: CPT

## 2021-11-02 PROCEDURE — 84403 ASSAY OF TOTAL TESTOSTERONE: CPT

## 2021-11-02 PROCEDURE — 84402 ASSAY OF FREE TESTOSTERONE: CPT

## 2021-11-02 PROCEDURE — 3052F HG A1C>EQUAL 8.0%<EQUAL 9.0%: CPT | Performed by: INTERNAL MEDICINE

## 2021-11-02 PROCEDURE — 82570 ASSAY OF URINE CREATININE: CPT

## 2021-11-02 PROCEDURE — 83036 HEMOGLOBIN GLYCOSYLATED A1C: CPT

## 2021-11-02 PROCEDURE — 3061F NEG MICROALBUMINURIA REV: CPT | Performed by: INTERNAL MEDICINE

## 2021-11-02 PROCEDURE — 82043 UR ALBUMIN QUANTITATIVE: CPT

## 2021-11-02 PROCEDURE — 80048 BASIC METABOLIC PNL TOTAL CA: CPT

## 2021-11-04 ENCOUNTER — OFFICE VISIT (OUTPATIENT)
Dept: ENDOCRINOLOGY | Facility: CLINIC | Age: 41
End: 2021-11-04
Payer: COMMERCIAL

## 2021-11-04 VITALS
SYSTOLIC BLOOD PRESSURE: 134 MMHG | WEIGHT: 171 LBS | DIASTOLIC BLOOD PRESSURE: 80 MMHG | TEMPERATURE: 97 F | BODY MASS INDEX: 27.48 KG/M2 | HEART RATE: 86 BPM | HEIGHT: 66 IN

## 2021-11-04 DIAGNOSIS — E10.65 TYPE 1 DIABETES MELLITUS WITH HYPERGLYCEMIA (HCC): Primary | ICD-10-CM

## 2021-11-04 DIAGNOSIS — E13.9 LADA (LATENT AUTOIMMUNE DIABETES IN ADULTS), MANAGED AS TYPE 1 (HCC): ICD-10-CM

## 2021-11-04 DIAGNOSIS — F64.9 GENDER DYSPHORIA: ICD-10-CM

## 2021-11-04 DIAGNOSIS — E16.2 HYPOGLYCEMIA: ICD-10-CM

## 2021-11-04 DIAGNOSIS — Z78.9 TRANSGENDER: ICD-10-CM

## 2021-11-04 LAB
TESTOST FREE SERPL-MCNC: 5.3 PG/ML (ref 6.8–21.5)
TESTOST SERPL-MCNC: 302 NG/DL (ref 264–916)

## 2021-11-04 PROCEDURE — 3008F BODY MASS INDEX DOCD: CPT | Performed by: INTERNAL MEDICINE

## 2021-11-04 PROCEDURE — 3079F DIAST BP 80-89 MM HG: CPT | Performed by: INTERNAL MEDICINE

## 2021-11-04 PROCEDURE — 3075F SYST BP GE 130 - 139MM HG: CPT | Performed by: INTERNAL MEDICINE

## 2021-11-04 PROCEDURE — 1036F TOBACCO NON-USER: CPT | Performed by: INTERNAL MEDICINE

## 2021-11-04 PROCEDURE — 99215 OFFICE O/P EST HI 40 MIN: CPT | Performed by: INTERNAL MEDICINE

## 2021-11-04 RX ORDER — SPIRONOLACTONE 25 MG/1
25 TABLET ORAL 2 TIMES DAILY
Qty: 180 TABLET | Refills: 3 | Status: SHIPPED | OUTPATIENT
Start: 2021-11-04 | End: 2022-02-16

## 2021-11-04 RX ORDER — ESTRADIOL 2 MG/1
2 TABLET ORAL DAILY
COMMUNITY
Start: 2021-09-09 | End: 2021-12-06

## 2021-11-04 RX ORDER — ESTRADIOL 2 MG/1
2 TABLET ORAL DAILY
COMMUNITY
Start: 2021-11-02 | End: 2021-12-06

## 2021-11-04 RX ORDER — INSULIN DEGLUDEC INJECTION 100 U/ML
INJECTION, SOLUTION SUBCUTANEOUS
COMMUNITY

## 2021-11-07 DIAGNOSIS — E13.9 LADA (LATENT AUTOIMMUNE DIABETES IN ADULTS), MANAGED AS TYPE 1 (HCC): ICD-10-CM

## 2021-11-07 DIAGNOSIS — E10.65 TYPE 1 DIABETES MELLITUS WITH HYPERGLYCEMIA (HCC): ICD-10-CM

## 2021-11-08 RX ORDER — INSULIN LISPRO 100 [IU]/ML
INJECTION, SOLUTION INTRAVENOUS; SUBCUTANEOUS
Qty: 5 ML | Refills: 3 | Status: SHIPPED | OUTPATIENT
Start: 2021-11-08

## 2021-12-06 DIAGNOSIS — Z78.9 TRANSGENDER: Primary | ICD-10-CM

## 2021-12-06 DIAGNOSIS — F64.9 GENDER DYSPHORIA: ICD-10-CM

## 2021-12-06 RX ORDER — ESTRADIOL 2 MG/1
2 TABLET ORAL DAILY
Qty: 90 TABLET | Refills: 3 | Status: SHIPPED | OUTPATIENT
Start: 2021-12-06 | End: 2022-02-16

## 2022-01-01 ENCOUNTER — APPOINTMENT (OUTPATIENT)
Dept: RADIOLOGY | Age: 42
End: 2022-01-01
Attending: PHYSICIAN ASSISTANT
Payer: COMMERCIAL

## 2022-01-01 ENCOUNTER — OFFICE VISIT (OUTPATIENT)
Dept: URGENT CARE | Age: 42
End: 2022-01-01
Payer: COMMERCIAL

## 2022-01-01 VITALS
RESPIRATION RATE: 16 BRPM | TEMPERATURE: 98 F | OXYGEN SATURATION: 99 % | SYSTOLIC BLOOD PRESSURE: 156 MMHG | HEART RATE: 106 BPM | DIASTOLIC BLOOD PRESSURE: 80 MMHG

## 2022-01-01 DIAGNOSIS — S93.402A SPRAIN OF LEFT ANKLE, UNSPECIFIED LIGAMENT, INITIAL ENCOUNTER: ICD-10-CM

## 2022-01-01 DIAGNOSIS — S99.922A FOOT INJURY, LEFT, INITIAL ENCOUNTER: Primary | ICD-10-CM

## 2022-01-01 DIAGNOSIS — T14.90XA INJURY: ICD-10-CM

## 2022-01-01 PROCEDURE — 99213 OFFICE O/P EST LOW 20 MIN: CPT | Performed by: NURSE PRACTITIONER

## 2022-01-01 PROCEDURE — 73630 X-RAY EXAM OF FOOT: CPT

## 2022-01-01 PROCEDURE — 73610 X-RAY EXAM OF ANKLE: CPT

## 2022-01-01 NOTE — PATIENT INSTRUCTIONS
Ankle Sprain   WHAT YOU NEED TO KNOW:   An ankle sprain happens when 1 or more ligaments in your ankle joint stretch or tear  Ligaments are tough tissues that connect bones  Ligaments support your joints and keep your bones in place  DISCHARGE INSTRUCTIONS:   Return to the emergency department if:   · You have severe pain in your ankle  · Your foot or toes are cold or numb  · Your ankle becomes more weak or unstable (wobbly)  · You are unable to put any weight on your ankle or foot  · Your swelling has increased or returned  Call your doctor if:   · Your pain does not go away, even after treatment  · You have questions or concerns about your condition or care  Medicines: You may need any of the following:  · NSAIDs , such as ibuprofen, help decrease swelling, pain, and fever  This medicine is available with or without a doctor's order  NSAIDs can cause stomach bleeding or kidney problems in certain people  If you take blood thinner medicine, always ask your healthcare provider if NSAIDs are safe for you  Always read the medicine label and follow directions  · Acetaminophen  decreases pain and fever  It is available without a doctor's order  Ask how much to take and how often to take it  Follow directions  Read the labels of all other medicines you are using to see if they also contain acetaminophen, or ask your doctor or pharmacist  Acetaminophen can cause liver damage if not taken correctly  Do not use more than 4 grams (4,000 milligrams) total of acetaminophen in one day  · Prescription pain medicine  may be given  Ask your healthcare provider how to take this medicine safely  Some prescription pain medicines contain acetaminophen  Do not take other medicines that contain acetaminophen without talking to your healthcare provider  Too much acetaminophen may cause liver damage  Prescription pain medicine may cause constipation   Ask your healthcare provider how to prevent or treat constipation  · Take your medicine as directed  Contact your healthcare provider if you think your medicine is not helping or if you have side effects  Tell him or her if you are allergic to any medicine  Keep a list of the medicines, vitamins, and herbs you take  Include the amounts, and when and why you take them  Bring the list or the pill bottles to follow-up visits  Carry your medicine list with you in case of an emergency  Self-care:   · Use support devices,  such as a brace, cast, or splint, to limit your movement and protect your joint  You may need to use crutches to decrease your pain as you move around  · Go to physical therapy as directed  A physical therapist teaches you exercises to help improve movement and strength, and to decrease pain  · Rest  your ankle so that it can heal  Return to normal activities as directed  · Apply ice  on your ankle for 15 to 20 minutes every hour or as directed  Use an ice pack, or put crushed ice in a plastic bag  Cover it with a towel  Ice helps prevent tissue damage and decreases swelling and pain  · Compress  your ankle  Ask if you should wrap an elastic bandage around your injured ligament  An elastic bandage provides support and helps decrease swelling and movement so your joint can heal  Wear as long as directed  · Elevate  your ankle above the level of your heart as often as you can  This will help decrease swelling and pain  Prop your ankle on pillows or blankets to keep it elevated comfortably  Prevent another ankle sprain:   · Let your ankle heal   Find out how long your ligament needs to heal  Do not do any physical activity until your healthcare provider says it is okay  If you start activity too soon, you may develop a more serious injury  · Always warm up and stretch  before you exercise or play sports  · Use the right equipment  Always wear shoes that fit well and are made for the activity that you are doing   You may also need ankle supports, elbow and knee pads, or braces  Follow up with your doctor as directed:  Write down your questions so you remember to ask them during your visits  © Copyright [employer] 2021 Information is for End User's use only and may not be sold, redistributed or otherwise used for commercial purposes  All illustrations and images included in CareNotes® are the copyrighted property of A D A M , Inc  or [facility]   The above information is an  only  It is not intended as medical advice for individual conditions or treatments  Talk to your doctor, nurse or pharmacist before following any medical regimen to see if it is safe and effective for you

## 2022-01-01 NOTE — PROGRESS NOTES
[address] [facility]        NAME: Mack Michele is a 37 y o  adult  : 1980    MRN: [id number]  DATE: 2022  TIME: 3:02 PM    Assessment and Plan   Foot injury, left, initial encounter [J52 [id number]]  1  Foot injury, left, initial encounter  XR foot 3+ vw left    XR ankle 3+ vw left   2  Sprain of left ankle, unspecified ligament, initial encounter           Patient Instructions     Ankle/foot sprain  OTC motrin prn for pain  Declined crutches   Follow up with PCP in 3-5 days  Proceed to  ER if symptoms worsen  Chief Complaint     Chief Complaint   Patient presents with    Ankle Injury     pt states was hiking on  and slipped, did not think she injured herself but left ankle was swollen that night, started with pain 3 days afterward and ankle and foot are swollen and toes are black and blue         History of Present Illness       HPI   Reports she went hiking on Dec 25th 2021, and twisted the left ankle  Did not feel any immediate pain  By the next day, the left ankle was swollen  Now pain with walking  Review of Systems   Review of Systems   Constitutional: Negative for chills and fever  Musculoskeletal: Positive for joint swelling (left ankle)  Skin: Positive for color change (blue and red)  Negative for wound  Neurological: Negative for weakness and numbness           Current Medications       Current Outpatient Medications:     BD Pen Needle Kaykay 2nd Gen 32G X 4 MM MISC, USE 4 PEN NEEDLES A DAY TO ADMINISTER INSULIN UNDER THE SKIN, Disp: 100 each, Rfl: 2    Continuous Blood Gluc  (FREESTYLE TAJ 14 DAY READER) ANNABEL, Use 1 reader device to check blood sugar, Disp: 1 Device, Rfl: 0    Continuous Blood Gluc Sensor (FreeStyle Taj 14 Day Sensor) MISC, Use 1 sensor every 14 days to check blood sugar, Disp: 6 each, Rfl: 1    CVS BISACODYL 5 MG EC tablet, TAKE 2 TABLETS (10 MG TOTAL) BY MOUTH ONCE FOR 1 DOSE, Disp: , Rfl:     CVS Glucose 4-6 GM-MG, CHEW 4 TABLETS (16 G TOTAL) AS NEEDED FOR LOW BLOOD SUGAR, Disp: , Rfl:     estradiol (ESTRACE) 2 MG tablet, Take 1 tablet (2 mg total) by mouth daily, Disp: 90 tablet, Rfl: 3    GAVILYTE-G 236 g solution, TAKE 4,000 ML BY MOUTH ONCE FOR 1 DOSE (Patient not taking: Reported on 11/4/2021), Disp: , Rfl:     glucagon (GLUCAGON EMERGENCY) 1 MG injection, Inject 1 mg under the skin once as needed for low blood sugar for up to 1 dose, Disp: 1 kit, Rfl: 0    glucose 4 g chewable tablet, Chew 4 tablets (16 g total) as needed for low blood sugar (Patient not taking: Reported on 6/17/2021), Disp: 100 tablet, Rfl: 3    Injection Device for Insulin (InPen 100-Blue-Milagros) ANNABEL, Use inpen device to administer Humalog insulin, Disp: 1 each, Rfl: 0    INS SYRINGE/NEEDLE 1CC/28G (B-D INS SYR MICROFINE 1CC/28G) 28G X 1/2" 1 ML MISC, Use to inject insulin daily (Patient not taking: Reported on 6/22/2020), Disp: 100 each, Rfl: 3    insulin degludec Desi Miller FlexTouch) 100 units/mL injection pen, Inject 30 units under the skin every morning, Disp: 30 mL, Rfl: 1    insulin degludec Desi Miller FlexTouch) 100 units/mL injection pen, , Disp: , Rfl:     insulin lispro (HumaLOG KwikPen) 100 units/mL injection pen, Use 7 units under the skin with breakfast and 5 units under the skin with lunch and dinner, Disp: 5 pen, Rfl: 3    Insulin Lispro (HumaLOG) 100 units/mL cartridge for injection, INJECT 7 UNITS BEFORE BREAKFAST , THE 5 UNITS BEFORE LUNCH AND DINNER USING INPEN DEVICE, Disp: 5 mL, Rfl: 3    linaCLOtide (Linzess) 290 MCG CAPS, Take 1 capsule by mouth daily, Disp: 30 capsule, Rfl: 5    naproxen (NAPROSYN) 500 mg tablet, Every 12 hours as needed for mild-mod pain; take with food (Patient not taking: Reported on 6/22/2020), Disp: 30 tablet, Rfl: 0    spironolactone (ALDACTONE) 25 mg tablet, Take 1 tablet (25 mg total) by mouth 2 (two) times a day, Disp: 180 tablet, Rfl: 3    Current Allergies     Allergies as of 01/01/2022 - Reviewed 01/01/2022 Allergen Reaction Noted    Other Allergic Rhinitis 01/25/2019            The following portions of the patient's history were reviewed and updated as appropriate: allergies, current medications, past family history, past medical history, past social history, past surgical history and problem list      Past Medical History:   Diagnosis Date    Allergic     Diabetes mellitus ([facility] [address] ) 10/25/2019       History reviewed  No pertinent surgical history  Family History   Problem Relation Age of Onset    Hypertension Mother     Diabetes Mother     Diabetes Father     Dementia Father     Substance Abuse Brother          Medications have been verified  Objective   /80   Pulse (!) 106   Temp 98 °F (36 7 °C)   Resp 16   SpO2 99%   No LMP recorded  Physical Exam     Physical Exam  Constitutional:       Appearance: She is not ill-appearing or diaphoretic  Musculoskeletal:         General: Swelling (mild swelling, lateral and medial malleolus, left ankle) and tenderness (with palpation of the lateral and mexdial malleolus) present  No deformity  Normal range of motion  Comments: pulse on the dorsum of the left foot is normal   Skin:     Capillary Refill: Capillary refill takes less than 2 seconds  Findings: Bruising (2nd, 3rd and 4th toes) present  Neurological:      Gait: Gait abnormal (mild limp, favoring the left foot)

## 2022-01-03 DIAGNOSIS — E13.9 LATENT AUTOIMMUNE DIABETES IN ADULTS (LADA), MANAGED AS TYPE 1 (HCC): ICD-10-CM

## 2022-01-03 RX ORDER — FLASH GLUCOSE SENSOR
KIT MISCELLANEOUS
Qty: 6 EACH | Refills: 1 | Status: SHIPPED | OUTPATIENT
Start: 2022-01-03 | End: 2022-02-18

## 2022-01-19 ENCOUNTER — TELEPHONE (OUTPATIENT)
Dept: ENDOCRINOLOGY | Facility: CLINIC | Age: 42
End: 2022-01-19

## 2022-01-19 NOTE — TELEPHONE ENCOUNTER
Pt called and stated that is ankle and foot are swollen, does not hurt  He did have an Xray and was told there was noting wrong  He noticed it started swelling 12/25/2021  He thought maybe he twisted it, that is why he had the xray  He wants to know if this could be symptoms from taking estradiol, spironolactone or insulin?

## 2022-01-19 NOTE — TELEPHONE ENCOUNTER
x-ray, primary care notes reviewed   Swelling/edema is possible estradiol and insulin however I would not expect this to be on only 1 side  It also would not cause blackish/bluish discoloration of toes and so it is more likely to be related to an injury      On labs, 11/2021, estradiol was slightly high   Recommend checking CMP, total, testosterone, estradiol-please order

## 2022-01-20 NOTE — TELEPHONE ENCOUNTER
Spoke with patient and reviewed recommendations around her concern with the bluish-black toes  She understood  Mailed labs to be done when received

## 2022-01-31 ENCOUNTER — APPOINTMENT (OUTPATIENT)
Dept: LAB | Facility: CLINIC | Age: 42
End: 2022-01-31
Payer: COMMERCIAL

## 2022-01-31 DIAGNOSIS — E10.65 TYPE 1 DIABETES MELLITUS WITH HYPERGLYCEMIA (HCC): ICD-10-CM

## 2022-01-31 DIAGNOSIS — E13.9 LADA (LATENT AUTOIMMUNE DIABETES IN ADULTS), MANAGED AS TYPE 1 (HCC): ICD-10-CM

## 2022-01-31 LAB
ALBUMIN SERPL BCP-MCNC: 3.3 G/DL (ref 3.5–5)
ALP SERPL-CCNC: 63 U/L (ref 46–116)
ALT SERPL W P-5'-P-CCNC: 21 U/L (ref 12–78)
ANION GAP SERPL CALCULATED.3IONS-SCNC: 3 MMOL/L (ref 4–13)
AST SERPL W P-5'-P-CCNC: 15 U/L (ref 5–45)
BILIRUB SERPL-MCNC: 1.09 MG/DL (ref 0.2–1)
BUN SERPL-MCNC: 16 MG/DL (ref 5–25)
CALCIUM ALBUM COR SERPL-MCNC: 10.8 MG/DL (ref 8.3–10.1)
CALCIUM SERPL-MCNC: 10.2 MG/DL (ref 8.3–10.1)
CHLORIDE SERPL-SCNC: 108 MMOL/L (ref 100–108)
CHOLEST SERPL-MCNC: 159 MG/DL
CO2 SERPL-SCNC: 27 MMOL/L (ref 21–32)
CREAT SERPL-MCNC: 0.65 MG/DL (ref 0.6–1.3)
EST. AVERAGE GLUCOSE BLD GHB EST-MCNC: 163 MG/DL
ESTRADIOL SERPL-MCNC: 59 PG/ML (ref 11–52.5)
GLUCOSE P FAST SERPL-MCNC: 159 MG/DL (ref 65–99)
HBA1C MFR BLD: 7.3 %
HDLC SERPL-MCNC: 69 MG/DL
LDLC SERPL CALC-MCNC: 72 MG/DL (ref 0–100)
NONHDLC SERPL-MCNC: 90 MG/DL
POTASSIUM SERPL-SCNC: 4.9 MMOL/L (ref 3.5–5.3)
PROT SERPL-MCNC: 7.1 G/DL (ref 6.4–8.2)
SODIUM SERPL-SCNC: 138 MMOL/L (ref 136–145)
TRIGL SERPL-MCNC: 91 MG/DL

## 2022-01-31 PROCEDURE — 80053 COMPREHEN METABOLIC PANEL: CPT

## 2022-01-31 PROCEDURE — 84403 ASSAY OF TOTAL TESTOSTERONE: CPT

## 2022-01-31 PROCEDURE — 84402 ASSAY OF FREE TESTOSTERONE: CPT

## 2022-01-31 PROCEDURE — 83036 HEMOGLOBIN GLYCOSYLATED A1C: CPT

## 2022-01-31 PROCEDURE — 82670 ASSAY OF TOTAL ESTRADIOL: CPT

## 2022-01-31 PROCEDURE — 80061 LIPID PANEL: CPT

## 2022-01-31 PROCEDURE — 36415 COLL VENOUS BLD VENIPUNCTURE: CPT

## 2022-02-01 LAB
TESTOST FREE SERPL-MCNC: 4.7 PG/ML (ref 6.8–21.5)
TESTOST SERPL-MCNC: 251 NG/DL (ref 264–916)

## 2022-02-04 ENCOUNTER — TELEPHONE (OUTPATIENT)
Dept: ENDOCRINOLOGY | Facility: CLINIC | Age: 42
End: 2022-02-04

## 2022-02-04 NOTE — TELEPHONE ENCOUNTER
----- Message from Janae Moy MD sent at 2/4/2022  1:49 PM EST -----  A1c has improved to 7 3% Total, free testosterone lower as compared to before ; estradiol low, not at goal-please ask patient if she has been taking Estrace regularly?    Calcium recent labs-recommend keeping well-hydratedWith next set of labs recommend checking CMP, vitamin-D, PTH

## 2022-02-04 NOTE — TELEPHONE ENCOUNTER
Spoke with patient and reviewed lab results  He understood    He confirmed taking the Estrace every morning

## 2022-02-16 ENCOUNTER — OFFICE VISIT (OUTPATIENT)
Dept: ENDOCRINOLOGY | Facility: CLINIC | Age: 42
End: 2022-02-16
Payer: COMMERCIAL

## 2022-02-16 VITALS
HEIGHT: 66 IN | HEART RATE: 81 BPM | BODY MASS INDEX: 28.61 KG/M2 | DIASTOLIC BLOOD PRESSURE: 82 MMHG | WEIGHT: 178 LBS | SYSTOLIC BLOOD PRESSURE: 130 MMHG

## 2022-02-16 DIAGNOSIS — E16.2 HYPOGLYCEMIA: ICD-10-CM

## 2022-02-16 DIAGNOSIS — E10.65 TYPE 1 DIABETES MELLITUS WITH HYPERGLYCEMIA (HCC): Primary | ICD-10-CM

## 2022-02-16 DIAGNOSIS — Z78.9 TRANSGENDER: ICD-10-CM

## 2022-02-16 DIAGNOSIS — F64.9 GENDER DYSPHORIA: ICD-10-CM

## 2022-02-16 PROCEDURE — 95251 CONT GLUC MNTR ANALYSIS I&R: CPT | Performed by: INTERNAL MEDICINE

## 2022-02-16 PROCEDURE — 99215 OFFICE O/P EST HI 40 MIN: CPT | Performed by: INTERNAL MEDICINE

## 2022-02-16 PROCEDURE — 3008F BODY MASS INDEX DOCD: CPT | Performed by: INTERNAL MEDICINE

## 2022-02-16 PROCEDURE — 3075F SYST BP GE 130 - 139MM HG: CPT | Performed by: INTERNAL MEDICINE

## 2022-02-16 PROCEDURE — 3079F DIAST BP 80-89 MM HG: CPT | Performed by: INTERNAL MEDICINE

## 2022-02-16 PROCEDURE — 1036F TOBACCO NON-USER: CPT | Performed by: INTERNAL MEDICINE

## 2022-02-16 RX ORDER — SPIRONOLACTONE 50 MG/1
50 TABLET, FILM COATED ORAL 2 TIMES DAILY
Qty: 180 TABLET | Refills: 3 | Status: SHIPPED | OUTPATIENT
Start: 2022-02-16

## 2022-02-16 RX ORDER — ESTRADIOL 2 MG/1
2 TABLET ORAL 2 TIMES DAILY
Qty: 180 TABLET | Refills: 3 | Status: SHIPPED | OUTPATIENT
Start: 2022-02-16 | End: 2023-02-16

## 2022-02-16 NOTE — PROGRESS NOTES
Xander Davis 37 y o  adult MRN: [id number]    Encounter: [id number]      Assessment/Plan     1  Type 1 diabetes mellitus on long-term insulin therapy with hyperglycemia   2  Hypoglycemia  Last A1c  7 3% which has improved as compared to before   Hypoglycemia is also less as compared to before,  Variability has improved    Recommend the following at this time   continue Tresiba 30 units subcutaneously morning   Changed  Insulin for Lunch medium carb meal to 7 units, 8 units for a higher carb meal    dinner- continue 7 units for medium, decrease to 7 5 units for high carb meal   Continue current correction, target   Advised patient to check blood sugars at bedtime, if low/ tight should have a small carbohydrate containing snack   Plan on review of blood sugars in 2-3 weeks   -repeat A1c, BMP in 3 months    This patient requires therapeutic CGM  The patient has diabetes and has been using a home glucose monitor and is performing frequent (3 times a day) home glucose monitor testing and is presently being treated with multi-dose insulin therapy (MDI) with 3 or more injections a day or with a continuous subcutaneous insulin infusion (CSII) pump  This patient's insulin treatment regimen requires frequent adjustments on the basis of therapeutic CGM testing results  Would Benefit from continues glucose monitor that can provide alarms, will put in a refill for freestyle Taj 2  Dexcom not covered in the past   May consider insulin pump in the future        3  Gender dysphoria   4  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   On gender affirming hormone therapy   Discussed labs, denies having taken extra Estrace with 1st set of labs, has not missed any doses      Increase spironolactone to 50 mg orally twice a day   Increase estrace to 2 mg orally twice a day    repeat labs done in 1 month     I have spent 45 minutes with Patient  today in which greater than 50% of this time was spent in counseling/coordination of care       CC: Diabetes    History of Present Illness     HPI:  Orlando Go is a 37 y o  adult presents for a follow-up visit regarding diabetes management and gender affirming hormone therapy    Last seen in 11/2021    Current regimen:   Tresiba 30 units subcutaneously morning    lunch  6 5 units for a medium carb meal, 7 5 units for a high carb meal   Dinner 7 units for medium, 8 units for high carb meal  Correction 1:35  Target 120     Statin: --  ACE-I/ARB: --   CGM interpretation  vargas   Time percentage CGM worn 86%   Time in range 43% (goal >65-70%)  High -31 %  Very high 23%  Low 2%  Very low 1%   CV   40 % (goal <33%)     Average glucose 191 mg/dL  GMI 7 9 %     In pen data reviewed And will be scanned into chart     Symptoms of hypoglycemia : sweating, wakes her up  dexcom not covered by insurance    Estrace 2 mg daily  spirillaceaesome breast formation 25 mg orally 2 times a dya   Noticed less body hair , needing to shave less frequency   Improved mood but feeling more sensitive   Shaving facial hair every ddya bit less   Occasional lighthewadedness if she has had a stressful day     All other systems were reviewed and are negative  Review of Systems      Historical Information   Past Medical History:   Diagnosis Date    Allergic     [facility]     History reviewed  No pertinent surgical history    Social History   Social History     Substance and Sexual Activity   Alcohol Use Yes    Comment: social drinker     Social History     Substance and Sexual Activity   Drug Use No     Social History     Tobacco Use   Smoking Status Former Smoker    Packs/day: 0 50    Years: 22 00    Pack years: 11 00    Quit date: 11/15/2018    Years since quitting: 3 2   Smokeless Tobacco Never Used     Family History:   Family History   Problem Relation Age of Onset    Hypertension Mother     Diabetes Mother     Diabetes Father     Dementia Father     Substance Abuse Brother        Meds/Allergies Current Outpatient Medications   Medication Sig Dispense Refill    BD Pen Needle Kaykay 2nd Gen 32G X 4 MM MISC USE 4 PEN NEEDLES A DAY TO ADMINISTER INSULIN UNDER THE SKIN 100 each 2    Continuous Blood Gluc  (FREESTYLE TAJ 14 DAY READER) ANNABEL Use 1 reader device to check blood sugar 1 Device 0    Continuous Blood Gluc Sensor (FreeStyle Taj 14 Day Sensor) MISC Use 1 sensor every 14 days to check blood sugar 6 each 1    CVS Glucose 4-6 GM-MG CHEW 4 TABLETS (16 G TOTAL) AS NEEDED FOR LOW BLOOD SUGAR      estradiol (ESTRACE) 2 MG tablet Take 1 tablet (2 mg total) by mouth daily 90 tablet 3    glucagon (GLUCAGON EMERGENCY) 1 MG injection Inject 1 mg under the skin once as needed for low blood sugar for up to 1 dose 1 kit 0    Injection Device for Insulin (InPen 100-Blue-Milagros) ANNABEL Use inpen device to administer Humalog insulin 1 each 0    insulin degludec (Tresiba FlexTouch) 100 units/mL injection pen Inject 30 units under the skin every morning 30 mL 1    insulin lispro (HumaLOG KwikPen) 100 units/mL injection pen Use 7 units under the skin with breakfast and 5 units under the skin with lunch and dinner 5 pen 3    Insulin Lispro (HumaLOG) 100 units/mL cartridge for injection INJECT 7 UNITS BEFORE BREAKFAST , THE 5 UNITS BEFORE LUNCH AND DINNER USING INPEN DEVICE 5 mL 3    spironolactone (ALDACTONE) 25 mg tablet Take 1 tablet (25 mg total) by mouth 2 (two) times a day 180 tablet 3    CVS BISACODYL 5 MG EC tablet TAKE 2 TABLETS (10 MG TOTAL) BY MOUTH ONCE FOR 1 DOSE (Patient not taking: Reported on 2/16/2022)      GAVILYTE-G 236 g solution TAKE 4,000 ML BY MOUTH ONCE FOR 1 DOSE (Patient not taking: Reported on 11/4/2021)      glucose 4 g chewable tablet Chew 4 tablets (16 g total) as needed for low blood sugar (Patient not taking: Reported on 6/17/2021) 100 tablet 3    INS SYRINGE/NEEDLE 1CC/28G (B-D INS SYR MICROFINE 1CC/28G) 28G X 1/2" 1 ML MISC Use to inject insulin daily (Patient not taking: Reported on 6/22/2020) 100 each 3    insulin degludec Cosmo Rosales FlexTouch) 100 units/mL injection pen  (Patient not taking: Reported on 11/4/2021)      linaCLOtide (Linzess) 290 MCG CAPS Take 1 capsule by mouth daily 30 capsule 5    naproxen (NAPROSYN) 500 mg tablet Every 12 hours as needed for mild-mod pain; take with food (Patient not taking: Reported on 6/22/2020) 30 tablet 0     No current facility-administered medications for this visit  Allergies   Allergen Reactions    Other Allergic Rhinitis     SEASONAL ALLERGIES AND PET DANDER       Objective   Vitals: Blood pressure 130/82, pulse 81, height 5' 6" (1 676 m), weight 80 7 kg (178 lb)  Physical Exam  Constitutional:       General: She is not in acute distress  Appearance: She is well-developed  She is not diaphoretic  HENT:      Head: Normocephalic and atraumatic  Eyes:      Conjunctiva/sclera: Conjunctivae normal       Pupils: Pupils are equal, round, and reactive to light  Cardiovascular:      Rate and Rhythm: Normal rate and regular rhythm  Heart sounds: Normal heart sounds  No murmur heard  Pulmonary:      Effort: Pulmonary effort is normal  No respiratory distress  Breath sounds: Normal breath sounds  No wheezing  Abdominal:      General: There is no distension  Palpations: Abdomen is soft  Tenderness: There is no abdominal tenderness  There is no guarding  Musculoskeletal:      Cervical back: Normal range of motion and neck supple  Skin:     General: Skin is warm and dry  Findings: No erythema or rash  Neurological:      Mental Status: She is alert and oriented to person, place, and time  Psychiatric:         Behavior: Behavior normal          Thought Content: Thought content normal          The history was obtained from the review of the chart, patient      Lab Results:   Lab Results   Component Value Date/Time    Hemoglobin A1C 7 3 (H) 01/31/2022 07:34 AM    Hemoglobin A1C 8 0 (H) 11/02/2021 07:08 AM    Hemoglobin A1C 8 6 (H) 07/07/2021 07:41 AM    BUN 16 01/31/2022 07:34 AM    BUN 17 11/02/2021 07:08 AM    BUN 14 07/07/2021 07:41 AM    Potassium 4 9 01/31/2022 07:34 AM    Potassium 4 2 11/02/2021 07:08 AM    Potassium 3 9 07/07/2021 07:41 AM    Chloride 108 01/31/2022 07:34 AM    Chloride 105 11/02/2021 07:08 AM    Chloride 104 07/07/2021 07:41 AM    CO2 27 01/31/2022 07:34 AM    CO2 29 11/02/2021 07:08 AM    CO2 27 07/07/2021 07:41 AM    Creatinine 0 65 01/31/2022 07:34 AM    Creatinine 0 65 11/02/2021 07:08 AM    Creatinine 0 77 07/07/2021 07:41 AM    AST 15 01/31/2022 07:34 AM    AST 11 07/07/2021 07:41 AM    AST 15 03/03/2021 07:54 AM    ALT 21 01/31/2022 07:34 AM    ALT 17 07/07/2021 07:41 AM    ALT 21 03/03/2021 07:54 AM    Albumin 3 3 (L) 01/31/2022 07:34 AM    Albumin 3 7 07/07/2021 07:41 AM    Albumin 3 5 03/03/2021 07:54 AM    HDL, Direct 69 01/31/2022 07:34 AM    HDL, Direct 84 11/02/2021 07:08 AM    HDL, Direct 84 07/07/2021 07:41 AM    Triglycerides 91 01/31/2022 07:34 AM    Triglycerides 99 11/02/2021 07:08 AM    Triglycerides 161 (H) 07/07/2021 07:41 AM       Component      Latest Ref Rng & Units 11/2/2021 1/31/2022   TESTOSTERONE FREE      6 8 - 21 5 pg/mL 5 3 (L) 4 7 (L)   Testosterone, Total, LC/MS      264 - 916 ng/dL 302 251 (L)   ESTRADIOL LEVEL      11 0 - 52 5 pg/mL 238 0 (H) 59 0 (H)     Imaging Studies: I have personally reviewed pertinent reports  Portions of the record may have been created with voice recognition software  Occasional wrong word or "sound a like" substitutions may have occurred due to the inherent limitations of voice recognition software  Read the chart carefully and recognize, using context, where substitutions have occurred

## 2022-02-16 NOTE — PATIENT INSTRUCTIONS
Increase spironolactone to 50 mg orally twice a day   Increase estrace to 2 mg orally twice a day   Please have repeat labs done in 1 month   Inpen settings changed as discussed   Please check blood sugars before meals and at bedtime, if blood sugars are tight/ low at bedtime, please have a carbohydrate containing snack approximately 15-20 g plan for review of blood sugars in 2-3 weeks   Follow-up in 3 months

## 2022-02-17 DIAGNOSIS — E10.65 TYPE 1 DIABETES MELLITUS WITH HYPERGLYCEMIA (HCC): Primary | ICD-10-CM

## 2022-02-17 RX ORDER — FLASH GLUCOSE SENSOR
KIT MISCELLANEOUS
Qty: 6 EACH | Refills: 3 | Status: SHIPPED | OUTPATIENT
Start: 2022-02-17 | End: 2022-02-18

## 2022-02-17 RX ORDER — FLASH GLUCOSE SCANNING READER
EACH MISCELLANEOUS
Qty: 1 EACH | Refills: 0 | Status: SHIPPED | OUTPATIENT
Start: 2022-02-17 | End: 2022-02-18

## 2022-02-17 NOTE — PROGRESS NOTES
Called and spoke with patient    He will contact his insurance company to see about coverage for [place] and where to send the prescription

## 2022-02-18 DIAGNOSIS — E10.65 TYPE 1 DIABETES MELLITUS WITH HYPERGLYCEMIA (HCC): ICD-10-CM

## 2022-02-18 RX ORDER — FLASH GLUCOSE SCANNING READER
EACH MISCELLANEOUS
Qty: 1 EACH | Refills: 0 | Status: SHIPPED | OUTPATIENT
Start: 2022-02-18

## 2022-02-18 RX ORDER — FLASH GLUCOSE SENSOR
KIT MISCELLANEOUS
Qty: 6 EACH | Refills: 3 | Status: SHIPPED | OUTPATIENT
Start: 2022-02-18

## 2022-03-07 ENCOUNTER — TELEPHONE (OUTPATIENT)
Dept: ENDOCRINOLOGY | Facility: CLINIC | Age: 42
End: 2022-03-07

## 2022-03-07 NOTE — TELEPHONE ENCOUNTER
Need more information from inPen download, also please ask patient if she has checked blood sugars at other times during the day?

## 2022-03-08 NOTE — TELEPHONE ENCOUNTER
Reviewed in pen report and freestyle Taj  Overall blood sugars have improved, average sugars 169 mg/dL, in target range 60% of the time  Still having low blood sugars-4%, usually post dinner  Please ask patient if she is able to identify if she was more active on these days or may have incorrectly assessed amount of carbohydrates that was consumed-was its a medium carbohydrate meal or should she had taken less insulin for a low carbohydrate meal?   Will decide changes accordingly     Recommend introducing new ICF/ sensitivity at 9 pm - 45

## 2022-03-08 NOTE — TELEPHONE ENCOUNTER
Spoke with Gila Pena and she agreed that she incorrectly assessed the amount of carbs she consumer  She will do your recommendation

## 2022-03-08 NOTE — TELEPHONE ENCOUNTER
Since the lows were explained, recommend continuing current doses of mealtime insulin, only change sensitivity for now      If she continues to have low blood sugars despite better estimation, she should let us know and I will make further changes

## 2022-04-06 DIAGNOSIS — E10.65 TYPE 1 DIABETES MELLITUS WITH HYPERGLYCEMIA (HCC): ICD-10-CM

## 2022-04-06 DIAGNOSIS — E13.9 LATENT AUTOIMMUNE DIABETES IN ADULTS (LADA), MANAGED AS TYPE 1 (HCC): ICD-10-CM

## 2022-04-06 NOTE — TELEPHONE ENCOUNTER
Reviewed      Recommend decreasing Tresiba to 28 units subcutaneously daily  I see that the ICF/correction was not changed as previously recommended   Recommend introducing a new correction factor at 6:00 p m  of 45

## 2022-04-07 RX ORDER — INSULIN DEGLUDEC INJECTION 100 U/ML
INJECTION, SOLUTION SUBCUTANEOUS
Qty: 30 ML | Refills: 1
Start: 2022-04-07 | End: 2022-05-02 | Stop reason: SDUPTHER

## 2022-04-07 NOTE — TELEPHONE ENCOUNTER
Spoke with patient and reviewed inpen and Ruth Saunders reports  Changes to correction factor    She understood

## 2022-04-11 ENCOUNTER — APPOINTMENT (OUTPATIENT)
Dept: LAB | Facility: CLINIC | Age: 42
End: 2022-04-11
Payer: COMMERCIAL

## 2022-04-11 DIAGNOSIS — Z78.9 TRANSGENDER: ICD-10-CM

## 2022-04-11 DIAGNOSIS — E16.2 HYPOGLYCEMIA: ICD-10-CM

## 2022-04-11 DIAGNOSIS — E10.65 TYPE 1 DIABETES MELLITUS WITH HYPERGLYCEMIA (HCC): ICD-10-CM

## 2022-04-11 DIAGNOSIS — E55.9 VITAMIN D DEFICIENCY: ICD-10-CM

## 2022-04-11 LAB
25(OH)D3 SERPL-MCNC: 11.6 NG/ML (ref 30–100)
ALBUMIN SERPL BCP-MCNC: 3.5 G/DL (ref 3.5–5)
ALP SERPL-CCNC: 47 U/L (ref 46–116)
ALT SERPL W P-5'-P-CCNC: 19 U/L (ref 12–78)
ANION GAP SERPL CALCULATED.3IONS-SCNC: 8 MMOL/L (ref 4–13)
AST SERPL W P-5'-P-CCNC: 17 U/L (ref 5–45)
BILIRUB SERPL-MCNC: 0.24 MG/DL (ref 0.2–1)
BUN SERPL-MCNC: 15 MG/DL (ref 5–25)
CALCIUM SERPL-MCNC: 9.5 MG/DL (ref 8.3–10.1)
CHLORIDE SERPL-SCNC: 107 MMOL/L (ref 100–108)
CHOLEST SERPL-MCNC: 147 MG/DL
CO2 SERPL-SCNC: 25 MMOL/L (ref 21–32)
CREAT SERPL-MCNC: 0.78 MG/DL (ref 0.6–1.3)
EST. AVERAGE GLUCOSE BLD GHB EST-MCNC: 157 MG/DL
ESTRADIOL SERPL-MCNC: 440 PG/ML (ref 11–52.5)
GLUCOSE P FAST SERPL-MCNC: 88 MG/DL (ref 65–99)
HBA1C MFR BLD: 7.1 %
HDLC SERPL-MCNC: 65 MG/DL
LDLC SERPL CALC-MCNC: 62 MG/DL (ref 0–100)
NONHDLC SERPL-MCNC: 82 MG/DL
POTASSIUM SERPL-SCNC: 4 MMOL/L (ref 3.5–5.3)
PROT SERPL-MCNC: 7.2 G/DL (ref 6.4–8.2)
PTH-INTACT SERPL-MCNC: 81.6 PG/ML (ref 18.4–80.1)
SODIUM SERPL-SCNC: 140 MMOL/L (ref 136–145)
TRIGL SERPL-MCNC: 101 MG/DL

## 2022-04-11 PROCEDURE — 84402 ASSAY OF FREE TESTOSTERONE: CPT

## 2022-04-11 PROCEDURE — 80053 COMPREHEN METABOLIC PANEL: CPT

## 2022-04-11 PROCEDURE — 3051F HG A1C>EQUAL 7.0%<8.0%: CPT | Performed by: FAMILY MEDICINE

## 2022-04-11 PROCEDURE — 82306 VITAMIN D 25 HYDROXY: CPT

## 2022-04-11 PROCEDURE — 36415 COLL VENOUS BLD VENIPUNCTURE: CPT

## 2022-04-11 PROCEDURE — 84403 ASSAY OF TOTAL TESTOSTERONE: CPT

## 2022-04-11 PROCEDURE — 82670 ASSAY OF TOTAL ESTRADIOL: CPT

## 2022-04-11 PROCEDURE — 80061 LIPID PANEL: CPT

## 2022-04-11 PROCEDURE — 83970 ASSAY OF PARATHORMONE: CPT

## 2022-04-11 PROCEDURE — 83036 HEMOGLOBIN GLYCOSYLATED A1C: CPT

## 2022-04-12 LAB
TESTOST FREE SERPL-MCNC: 1.8 PG/ML (ref 6.8–21.5)
TESTOST SERPL-MCNC: 27 NG/DL (ref 264–916)

## 2022-04-16 ENCOUNTER — NURSE TRIAGE (OUTPATIENT)
Dept: OTHER | Facility: OTHER | Age: 42
End: 2022-04-16

## 2022-04-16 NOTE — TELEPHONE ENCOUNTER
Reason for Disposition   [1] COVID-19 diagnosed by positive lab test (e g , PCR, rapid self-test kit) AND [2] mild symptoms (e g , cough, fever, others) AND [3] no complications or SOB    Answer Assessment - Initial Assessment Questions  1  COVID-19 DIAGNOSIS: "Who made your COVID-19 diagnosis?" "Was it confirmed by a positive lab test or self-test?" If not diagnosed by a doctor (or NP/PA), ask "Are there lots of cases (community spread) where you live?" Note: See public health department website, if unsure  symtpoms and home test +    3  ONSET: "When did the COVID-19 symptoms start?"       4/13    5  COUGH: "Do you have a cough?" If Yes, ask: "How bad is the cough?"        dry  6  FEVER: "Do you have a fever?" If Yes, ask: "What is your temperature, how was it measured, and when did it start?"      Thermometer isn't working; feels like she is warm   7  RESPIRATORY STATUS: "Describe your breathing?" (e g , shortness of breath, wheezing, unable to speak)       Tightness in chest-she states it occurs with coughing and then it subsides; she does state "it does feel like someone is sitting on my chest" but then states "it feels like I am wearing a bra that is too tight "   8  BETTER-SAME-WORSE: "Are you getting better, staying the same or getting worse compared to yesterday?"  If getting worse, ask, "In what way?"      Cough is better today; chills and headache worse  9  HIGH RISK DISEASE: "Do you have any chronic medical problems?" (e g , asthma, heart or lung disease, weak immune system, obesity, etc )      no  10  VACCINE: "Have you had the COVID-19 vaccine?" If Yes, ask: "Which one, how many shots, when did you get it?"        yes  11  BOOSTER: "Have you received your COVID-19 booster?" If Yes, ask: "Which one and when did you get it?"   yes  13   OTHER SYMPTOMS: "Do you have any other symptoms?"  (e g , chills, fatigue, headache, loss of smell or taste, muscle pain, sore throat)        Cough, chest tightness, chills, headache, vomited today  14   O2 SATURATION MONITOR:  "Do you use an oxygen saturation monitor (pulse oximeter) at home?" If Yes, ask "What is your reading (oxygen level) today?" "What is your usual oxygen saturation reading?" (e g , 95%)        no    Protocols used: CORONAVIRUS (COVID-19) DIAGNOSED OR SUSPECTED-ADULT-

## 2022-04-16 NOTE — TELEPHONE ENCOUNTER
Regarding: Covid Positive- Tighness in Chest  ----- Message from [facility] sent at 4/16/2022 12:59 PM EDT -----  "I have  chills, fever (unknown), cough, tightness in chest   I took an at home covid test and it came back positive and I am not sure what the next steps are that I should be taking "

## 2022-04-18 ENCOUNTER — TELEMEDICINE (OUTPATIENT)
Dept: FAMILY MEDICINE CLINIC | Facility: CLINIC | Age: 42
End: 2022-04-18
Payer: COMMERCIAL

## 2022-04-18 DIAGNOSIS — U07.1 COVID-19 VIRUS INFECTION: Primary | ICD-10-CM

## 2022-04-18 DIAGNOSIS — E13.9 LADA (LATENT AUTOIMMUNE DIABETES IN ADULTS), MANAGED AS TYPE 1 (HCC): ICD-10-CM

## 2022-04-18 PROCEDURE — 1036F TOBACCO NON-USER: CPT | Performed by: FAMILY MEDICINE

## 2022-04-18 PROCEDURE — 99213 OFFICE O/P EST LOW 20 MIN: CPT | Performed by: FAMILY MEDICINE

## 2022-04-18 NOTE — TELEPHONE ENCOUNTER
Left message for patient to return call   (to check on sxs of covid  If patient is having sxs she should have an virtual visit  Dr Abraham Dewey is listed as PCP however patient last appt here was 05/2020 with  Dr Lezama Patches

## 2022-04-18 NOTE — PROGRESS NOTES
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), managed as type 1 ([facility] )      Other Visit Diagnoses     COVID-19 virus infection    -  Primary        T down to 99 with decreased cough  Continue with symptom treatment for cough  Rest  Stay hydrated  Monitor fingerstick blood sugars  Advised to call if any changes  Discontinuing home isolation    Patients with confirmed COVID-19 should remain under home isolation precautions until the following conditions are met:   - They have had no fever for at least 24 hours (that is one full day of no fever without the use medicine that reduces fevers)  AND  - other symptoms have improved (for example, when their cough or shortness of breath have improved)  AND  - If had mild or moderate illness, at least 10 days have passed since their symptoms first appeared or if severe illness (needed oxygen) or immunosuppressed, at least 20 days have passed since symptoms first appeared  Patients with confirmed COVID-19 should also notify close contacts (including their workplace) and ask that they self-quarantine  Currently, close contact is defined as being within 6 feet for 15 minutes or more from the period 24 hours starting 48 hours before symptom onset to the time at which the patient went into isolation  Close contacts of patients diagnosed with COVID-19 should be instructed by the patient to self-quarantine for 14 days from the last time of their last contact with the patient  Source: [employer]          HPI + home COV 19 test on 04/16/2022  Symptoms started on 04/15/2022  T to 100  Non productive cough  SOB with overexertion not at rest  Fully vaccinated    Type 1 DM blood sugars have been stable     Lab Results   Component Value Date    HGBA1C 7 1 (H) 04/11/2022       Review of Systems   Constitutional: Positive for appetite change (decreased appetite ), fatigue and fever  HENT: Positive for congestion  Negative for rhinorrhea and sore throat  Loss of taste    Respiratory: Positive for cough and shortness of breath  Negative for wheezing  See HPI    Gastrointestinal: Positive for diarrhea and vomiting  Musculoskeletal: Negative for myalgias  Skin: Negative for rash  Neurological: Negative for headaches  Recent Visits  No visits were found meeting these conditions  Showing recent visits within past 7 days and meeting all other requirements  Today's Visits  Date Type Provider Dept   04/18/22 [address], MD [facility]   Showing today's visits and meeting all other requirements  Future Appointments  No visits were found meeting these conditions    Showing future appointments within next 150 days and meeting all other requirements         I spent 8 minutes directly with the patient during this visit

## 2022-05-02 DIAGNOSIS — E13.9 LATENT AUTOIMMUNE DIABETES IN ADULTS (LADA), MANAGED AS TYPE 1 (HCC): ICD-10-CM

## 2022-05-02 DIAGNOSIS — E10.65 TYPE 1 DIABETES MELLITUS WITH HYPERGLYCEMIA (HCC): ICD-10-CM

## 2022-05-02 RX ORDER — INSULIN DEGLUDEC INJECTION 100 U/ML
INJECTION, SOLUTION SUBCUTANEOUS
Qty: 30 ML | Refills: 1 | Status: SHIPPED | OUTPATIENT
Start: 2022-05-02

## 2022-05-04 ENCOUNTER — TELEPHONE (OUTPATIENT)
Dept: ENDOCRINOLOGY | Facility: CLINIC | Age: 42
End: 2022-05-04

## 2022-05-04 DIAGNOSIS — Z78.9 TRANSGENDER: Primary | ICD-10-CM

## 2022-05-04 RX ORDER — ESTRADIOL 1 MG/1
TABLET ORAL
Qty: 90 TABLET | Refills: 1 | Status: SHIPPED | OUTPATIENT
Start: 2022-05-04

## 2022-05-04 NOTE — TELEPHONE ENCOUNTER
Spoke with patient and reviewed lab results  She understood  She confirmed that she used to drink a 8oz glass of oat milk every morning, but she stopped  She saw these results, so she started the oat milk again  That is all the vitamin D she takes

## 2022-05-04 NOTE — TELEPHONE ENCOUNTER
----- Message from James Koroma MD sent at 5/3/2022  4:34 PM EDT -----  Total testosterone level at goal however estrogen level very high, recommend decreasing Estrace to 3 mg orally daily Repeat total, free testosterone, estradiol in 2 months Vitamin-D level low, PTH high, please asked patient what dose of vitamin-D 3 she is taking

## 2022-05-05 DIAGNOSIS — E55.9 VITAMIN D DEFICIENCY: Primary | ICD-10-CM

## 2022-05-05 RX ORDER — ERGOCALCIFEROL (VITAMIN D2) 1250 MCG
CAPSULE ORAL
Qty: 12 CAPSULE | Refills: 0 | Status: SHIPPED | OUTPATIENT
Start: 2022-05-05

## 2022-05-05 NOTE — TELEPHONE ENCOUNTER
----- Message from Lowell Lovett MD sent at 5/4/2022  3:54 PM EDT -----  Recommend starting vitamin D2 50,000 international units orally once a week for 12 weeks  Then start maintenance with vitamin D3 2000 international units orally daily which the patient can get over-the-counter    Repeat vitamin-D level in 3 months

## 2022-07-11 ENCOUNTER — APPOINTMENT (OUTPATIENT)
Dept: LAB | Facility: CLINIC | Age: 42
End: 2022-07-11
Payer: COMMERCIAL

## 2022-07-11 DIAGNOSIS — E55.9 VITAMIN D DEFICIENCY: ICD-10-CM

## 2022-07-11 DIAGNOSIS — Z78.9 TRANSGENDER: ICD-10-CM

## 2022-07-11 LAB
25(OH)D3 SERPL-MCNC: 46.4 NG/ML (ref 30–100)
ESTRADIOL SERPL-MCNC: 144 PG/ML (ref 11–52.5)

## 2022-07-11 PROCEDURE — 82306 VITAMIN D 25 HYDROXY: CPT

## 2022-07-11 PROCEDURE — 36415 COLL VENOUS BLD VENIPUNCTURE: CPT

## 2022-07-11 PROCEDURE — 84402 ASSAY OF FREE TESTOSTERONE: CPT

## 2022-07-11 PROCEDURE — 82670 ASSAY OF TOTAL ESTRADIOL: CPT

## 2022-07-11 PROCEDURE — 84403 ASSAY OF TOTAL TESTOSTERONE: CPT

## 2022-07-12 LAB
TESTOST FREE SERPL-MCNC: 2.8 PG/ML (ref 6.8–21.5)
TESTOST SERPL-MCNC: 23 NG/DL (ref 264–916)

## 2022-07-29 DIAGNOSIS — E10.65 TYPE 1 DIABETES MELLITUS WITH HYPERGLYCEMIA (HCC): ICD-10-CM

## 2022-08-01 RX ORDER — PEN NEEDLE, DIABETIC 32GX 5/32"
NEEDLE, DISPOSABLE MISCELLANEOUS
Qty: 100 EACH | Refills: 2 | Status: SHIPPED | OUTPATIENT
Start: 2022-08-01

## 2022-08-22 ENCOUNTER — OFFICE VISIT (OUTPATIENT)
Dept: ENDOCRINOLOGY | Facility: CLINIC | Age: 42
End: 2022-08-22
Payer: COMMERCIAL

## 2022-08-22 VITALS
SYSTOLIC BLOOD PRESSURE: 120 MMHG | HEIGHT: 66 IN | HEART RATE: 80 BPM | WEIGHT: 197.3 LBS | BODY MASS INDEX: 31.71 KG/M2 | DIASTOLIC BLOOD PRESSURE: 76 MMHG

## 2022-08-22 DIAGNOSIS — E16.2 HYPOGLYCEMIA: ICD-10-CM

## 2022-08-22 DIAGNOSIS — F64.9 GENDER DYSPHORIA: ICD-10-CM

## 2022-08-22 DIAGNOSIS — E10.65 TYPE 1 DIABETES MELLITUS WITH HYPERGLYCEMIA (HCC): Primary | ICD-10-CM

## 2022-08-22 DIAGNOSIS — Z78.9 TRANSGENDER: ICD-10-CM

## 2022-08-22 DIAGNOSIS — E55.9 VITAMIN D DEFICIENCY: ICD-10-CM

## 2022-08-22 DIAGNOSIS — R79.89 HIGH SERUM PARATHYROID HORMONE (PTH): ICD-10-CM

## 2022-08-22 PROCEDURE — 3074F SYST BP LT 130 MM HG: CPT | Performed by: INTERNAL MEDICINE

## 2022-08-22 PROCEDURE — 99215 OFFICE O/P EST HI 40 MIN: CPT | Performed by: INTERNAL MEDICINE

## 2022-08-22 PROCEDURE — 95251 CONT GLUC MNTR ANALYSIS I&R: CPT | Performed by: INTERNAL MEDICINE

## 2022-08-22 PROCEDURE — 3078F DIAST BP <80 MM HG: CPT | Performed by: INTERNAL MEDICINE

## 2022-08-22 RX ORDER — SPIRONOLACTONE 50 MG/1
75 TABLET, FILM COATED ORAL 2 TIMES DAILY
Qty: 270 TABLET | Refills: 3 | Status: SHIPPED | OUTPATIENT
Start: 2022-08-22 | End: 2022-09-09 | Stop reason: SDUPTHER

## 2022-08-22 NOTE — PATIENT INSTRUCTIONS
In pen Settings as changed   Plan for review of blood sugars in 2-3 weeks   If you know your going to be more active, please take less insulin for the meal before or have a carbohydrate containing snack to avoid low blood sugars  Follow-up with Ophthalmology   Labs as ordered   Increase spironolactone to 75 mg orally twice a day   BMP in 1 month

## 2022-08-22 NOTE — PROGRESS NOTES
Yudy Lechuga 40 y o  adult MRN: [id number]    Encounter: [id number]      Assessment/Plan     1  Type 1 diabetes mellitus on long-term insulin therapy with hypo and hyperglycemia    Recommend the following at this time  Decrease Tresiba to 28-30 units subcutaneously in the morning   Changed Inpen settings to medium carb meal at lunch to 7 5 units, decreased dinner medium carb to 6 5 units   Advised to take less insulin for the meal before when patient know she is going to be more active/have a carbohydrate containing snack to prevent a low   -check labs as ordered  -reminded to have yearly eye exam done     2  Burning sensation/pain in the feet-check TSH, free T4, vitamin B12   Follow-up with Podiatry     3  Gender dysphoria  4  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  Estradiol level at goal of 100-200 pg/ml; prior levels were much higher at the same dose and so will repeat   Continue estrace 2 mg in the morning, 1 mg in the evening  Increase spironolactone to 75 mg orally twice a day, repeat BMP in 1 month     5  Mary deficiency  6  PTH elevated x1, hypercalcemia X 1   calcium levels normal on last set of labs   Likely with Secondary hyperparathyroidism due to vitamin d deficiency   - continue vitamin D supplementation     I have spent 40 minutes with Patient  today in which greater than 50% of this time was spent in counseling/coordination of care  CC: Diabetes    History of Present Illness     HPI:  Yudy Lechuga is a 40 y o  adult presents for a follow-up visit regarding diabetes management  Last seen in 2/2022  Last Eye exam: 1 5 years ago     Current regimen:   tresiba 30 units in the morning   Inpen  - low, medium, high carb meal   Breakfast 5, 6, 7 units    lunch  5, 7, 8 units  Dinner 5, 7, 7 5  Units   Correction 1:45  Target 120    Feet  - has sharp jabbing pain in the evening for the first 1 hr after work when she gets into bed  stable, long time   Met podiatry 2 years ago - was told it was a neuroma   No vision changes   No CP/SOB     Likes the Ernesto 2 however states that it has been following off frequently which did not happen with the Orozco 1, has spoken to the [employer] rep ; has a different adhesive     Vitamin D3 500 IU daily     Home glucose monitoring:  CGM interpretation  Taj 2   Time percentage CGM worn 87  %   Time in range  60% (goal >65-70%)  High -20 %  Very high 17 %  Low  3%  Very low 3%      (goal <50)     Average glucose 170 mg/dL  GMI 7 4 %     Symptoms of hypoglycemia :  Shakiness/ tremors    Estrace 2 mg, 1 mg BID   Spironolactone 50 mg BID   Skin has been softending   Mood - much better   Body hair is slowing down but wishes it was quicker   Some breast development, has nipple tenderness  Less facial hair as compared to before, but still shaving twice a day, hoping for laser hair removal in the near future   Noticed ED, would like to have penetrative ability    All other systems were reviewed and are negative  Review of Systems      Historical Information   Past Medical History:   Diagnosis Date    Allergic     Diabetes mellitus ([facility] ) 10/25/2019     No past surgical history on file    Social History   Social History     Substance and Sexual Activity   Alcohol Use Yes    Comment: social drinker     Social History     Substance and Sexual Activity   Drug Use No     Social History     Tobacco Use   Smoking Status Former Smoker    Packs/day: 0 50    Years: 22 00    Pack years: 11 00    Quit date: 11/15/2018    Years since quitting: 3 7   Smokeless Tobacco Never Used     Family History:   Family History   Problem Relation Age of Onset    Hypertension Mother     Diabetes Mother     Diabetes Father     Dementia Father     Substance Abuse Brother        Meds/Allergies   Current Outpatient Medications   Medication Sig Dispense Refill    Continuous Blood Gluc  (FreeStyle Taj 2 Davin) ANNABEL Use to test blood sugar daily 1 each 0    Continuous Blood Gluc Sensor (FreeStyle Taj 2 Sensor) MISC Use to test blood sugar 3 times a day 6 each 3    [facility] Glucose 4-6 GM-MG CHEW 4 TABLETS (16 G TOTAL) AS NEEDED FOR LOW BLOOD SUGAR      estradiol (Estrace) 1 mg tablet Take one tablet (1 mg) along with one tablet (2 mg) for a total of 3 mg daily 90 tablet 1    estradiol (ESTRACE) 2 MG tablet Take 1 tablet (2 mg total) by mouth 2 (two) times a day 180 tablet 3    glucagon (GLUCAGON EMERGENCY) 1 MG injection Inject 1 mg under the skin once as needed for low blood sugar for up to 1 dose 1 kit 0    InPen 100-Blue-Milagros ANNABEL USE INPEN DEVICE TO ADMINISTER HUMALOG INSULIN 1 each 0    insulin degludec (Tresiba FlexTouch) 100 units/mL injection pen Inject 30 units under the skin every morning 30 mL 1    insulin lispro (HumaLOG KwikPen) 100 units/mL injection pen Use 7 units under the skin with breakfast and 5 units under the skin with lunch and dinner 5 pen 3    Insulin Pen Needle (BD Pen Needle Kaykay 2nd Gen) 32G X 4 MM MISC Use to inject insulin 4 times a day 100 each 2    spironolactone (ALDACTONE) 50 mg tablet Take 1 tablet (50 mg total) by mouth 2 (two) times a day 180 tablet 3    [facility] BISACODYL 5 MG EC tablet TAKE 2 TABLETS (10 MG TOTAL) BY MOUTH ONCE FOR 1 DOSE (Patient not taking: Reported on 2/16/2022)      ergocalciferol (ERGOCALCIFEROL) 1 25 MG (17725 UT) capsule Take one capsule by mouth once a week for 12 weeks (Patient not taking: Reported on 8/22/2022) 12 capsule 0    GAVILYTE-G 236 g solution TAKE 4,000 ML BY MOUTH ONCE FOR 1 DOSE (Patient not taking: Reported on 11/4/2021)      glucose 4 g chewable tablet Chew 4 tablets (16 g total) as needed for low blood sugar (Patient not taking: No sig reported) 100 tablet 3    INS SYRINGE/NEEDLE 1CC/28G (B-D INS SYR MICROFINE 1CC/28G) 28G X 1/2" 1 ML MISC Use to inject insulin daily (Patient not taking: Reported on 6/22/2020) 100 each 3    insulin degludec [employer] Burow FlexTouch) 100 units/mL injection pen  (Patient not taking: Reported on 8/22/2022)      Insulin Lispro (HumaLOG) 100 units/mL cartridge for injection INJECT 7 UNITS BEFORE BREAKFAST , THE 5 UNITS BEFORE LUNCH AND DINNER USING INPEN DEVICE (Patient not taking: Reported on 8/22/2022) 5 mL 3    linaCLOtide (Linzess) 290 MCG CAPS Take 1 capsule by mouth daily 30 capsule 5    naproxen (NAPROSYN) 500 mg tablet Every 12 hours as needed for mild-mod pain; take with food (Patient not taking: Reported on 6/22/2020) 30 tablet 0     No current facility-administered medications for this visit  Allergies   Allergen Reactions    Other Allergic Rhinitis     SEASONAL ALLERGIES AND PET DANDER       Objective   Vitals: Blood pressure 120/76, pulse 80, height 5' 6" (1 676 m), weight 89 5 kg (197 lb 4 8 oz)  Physical Exam  Constitutional:       General: She is not in acute distress  Appearance: She is well-developed  She is not diaphoretic  HENT:      Head: Normocephalic and atraumatic  Eyes:      Conjunctiva/sclera: Conjunctivae normal       Pupils: Pupils are equal, round, and reactive to light  Cardiovascular:      Rate and Rhythm: Normal rate and regular rhythm  Pulses: no weak pulses          Dorsalis pedis pulses are 2+ on the right side and 2+ on the left side  Heart sounds: Normal heart sounds  No murmur heard  Pulmonary:      Effort: Pulmonary effort is normal  No respiratory distress  Breath sounds: Normal breath sounds  No wheezing  Abdominal:      General: There is no distension  Palpations: Abdomen is soft  Tenderness: There is no abdominal tenderness  There is no guarding  Musculoskeletal:      Cervical back: Normal range of motion and neck supple  Feet:      Right foot:      Skin integrity: No ulcer, skin breakdown, erythema, warmth, callus or dry skin  Left foot:      Skin integrity: No ulcer, skin breakdown, erythema, warmth, callus or dry skin  Skin:     General: Skin is warm and dry  Findings: No erythema or rash  Neurological:      Mental Status: She is alert and oriented to person, place, and time  Psychiatric:         Behavior: Behavior normal          Thought Content: Thought content normal        Diabetic Foot Exam    Patient's shoes and socks removed  Right Foot/Ankle   Right Foot Inspection  Skin Exam: skin normal and skin intact  No dry skin, no warmth, no callus, no erythema, no maceration, no abnormal color, no pre-ulcer, no ulcer and no callus  Sensory   Vibration: intact  Proprioception: intact  Monofilament testing: intact    Vascular  Capillary refills: < 3 seconds  The right DP pulse is 2+  Left Foot/Ankle  Left Foot Inspection  Skin Exam: skin normal and skin intact  No dry skin, no warmth, no erythema, no maceration, normal color, no pre-ulcer, no ulcer and no callus  Sensory   Vibration: intact  Proprioception: intact  Monofilament testing: intact    Vascular  Capillary refills: < 3 seconds  The left DP pulse is 2+  Assign Risk Category  No deformity present  No loss of protective sensation  No weak pulses  Risk: 0  The history was obtained from the review of the chart, patient      Lab Results:   Lab Results   Component Value Date/Time    Hemoglobin A1C 7 1 (H) 04/11/2022 07:07 AM    Hemoglobin A1C 7 3 (H) 01/31/2022 07:34 AM    Hemoglobin A1C 8 0 (H) 11/02/2021 07:08 AM    BUN 15 04/11/2022 07:07 AM    BUN 16 01/31/2022 07:34 AM    BUN 17 11/02/2021 07:08 AM    Potassium 4 0 04/11/2022 07:07 AM    Potassium 4 9 01/31/2022 07:34 AM    Potassium 4 2 11/02/2021 07:08 AM    Chloride 107 04/11/2022 07:07 AM    Chloride 108 01/31/2022 07:34 AM    Chloride 105 11/02/2021 07:08 AM    CO2 25 04/11/2022 07:07 AM    CO2 27 01/31/2022 07:34 AM    CO2 29 11/02/2021 07:08 AM    Creatinine 0 78 04/11/2022 07:07 AM    Creatinine 0 65 01/31/2022 07:34 AM    Creatinine 0 65 11/02/2021 07:08 AM    AST 17 04/11/2022 07:07 AM    AST 15 01/31/2022 07:34 AM    ALT 19 04/11/2022 07:07 AM    ALT 21 01/31/2022 07:34 AM    Albumin 3 5 04/11/2022 07:07 AM    Albumin 3 3 (L) 01/31/2022 07:34 AM    HDL, Direct 65 04/11/2022 07:07 AM    HDL, Direct 69 01/31/2022 07:34 AM    HDL, Direct 84 11/02/2021 07:08 AM    Triglycerides 101 04/11/2022 07:07 AM    Triglycerides 91 01/31/2022 07:34 AM    Triglycerides 99 11/02/2021 07:08 AM         Imaging Studies: I have personally reviewed pertinent reports  Portions of the record may have been created with voice recognition software  Occasional wrong word or "sound a like" substitutions may have occurred due to the inherent limitations of voice recognition software  Read the chart carefully and recognize, using context, where substitutions have occurred

## 2022-09-06 ENCOUNTER — TELEPHONE (OUTPATIENT)
Dept: DERMATOLOGY | Facility: CLINIC | Age: 42
End: 2022-09-06

## 2022-09-06 NOTE — TELEPHONE ENCOUNTER
Pt lvm at 1052am on 9/6 then called [employer] stating she was trying to get in touch with our office but hasn't been able to do so  I spoke to pt and she stated she has been trying to get her ins to pay for Laser Hair Removal on her face and neck but it has been a frustrating process  She wanted to know if we would bill her ins for [facility]  I explained to pt she would first need a [facility] consult with the provider then the doctor would be able to talk to her about the process and that they would most likely have to do a prior auth  She is scheduled for 9/19 at 8am with Dr Veronika Lane at [facility]

## 2022-09-08 ENCOUNTER — TELEPHONE (OUTPATIENT)
Dept: DERMATOLOGY | Facility: CLINIC | Age: 42
End: 2022-09-08

## 2022-09-08 NOTE — TELEPHONE ENCOUNTER
email sent to practice manager if Hair Laser Removal can be PA'd prior to NP apt for consultation  Pt calling regarding questions about billing and insurance and your capabilities to do that for a certain procedure - Hair Laser Removal  Apt was scheduled but couldn't answer question about billing  Like if it's not be able to be billed properly, that's not going to help me at all  So if somebody could call me back, there is a procedure I want to have done  Laser hair removal  My insurance is going to pay for it, but I need to know if you're able to bill that to insurance because I've been having many problems with many doctors offices not being able to do that for some unknown reason  So if somebody could please call me back  My telephone number is

## 2022-09-08 NOTE — TELEPHONE ENCOUNTER
alana zuniga who wanted to make sure her LHR will be covered by her insurance I advised her she must come in for the consult and the provider and nurse will submit the request to the insurance company along with her medical records to get the approval, I advised her I have not seen a person (transgender) get denied as of yet so she should be fine but I reminded her again that she will still need to come in for the consult and the dr needs to do her part first tabatha

## 2022-09-09 DIAGNOSIS — Z78.9 TRANSGENDER: ICD-10-CM

## 2022-09-09 DIAGNOSIS — F64.9 GENDER DYSPHORIA: ICD-10-CM

## 2022-09-09 RX ORDER — SPIRONOLACTONE 50 MG/1
75 TABLET, FILM COATED ORAL 2 TIMES DAILY
Qty: 270 TABLET | Refills: 3 | Status: SHIPPED | OUTPATIENT
Start: 2022-09-09

## 2022-09-12 ENCOUNTER — APPOINTMENT (OUTPATIENT)
Dept: LAB | Facility: CLINIC | Age: 42
End: 2022-09-12
Payer: COMMERCIAL

## 2022-09-12 DIAGNOSIS — E10.65 TYPE 1 DIABETES MELLITUS WITH HYPERGLYCEMIA (HCC): ICD-10-CM

## 2022-09-12 DIAGNOSIS — E16.2 HYPOGLYCEMIA: ICD-10-CM

## 2022-09-12 DIAGNOSIS — F64.9 GENDER DYSPHORIA: ICD-10-CM

## 2022-09-12 DIAGNOSIS — Z78.9 TRANSGENDER: ICD-10-CM

## 2022-09-12 DIAGNOSIS — R79.89 HIGH SERUM PARATHYROID HORMONE (PTH): ICD-10-CM

## 2022-09-12 DIAGNOSIS — E55.9 VITAMIN D DEFICIENCY: ICD-10-CM

## 2022-09-12 LAB
ALBUMIN SERPL BCP-MCNC: 3.2 G/DL (ref 3.5–5)
ALP SERPL-CCNC: 50 U/L (ref 46–116)
ALT SERPL W P-5'-P-CCNC: 21 U/L (ref 12–78)
ANION GAP SERPL CALCULATED.3IONS-SCNC: 6 MMOL/L (ref 4–13)
AST SERPL W P-5'-P-CCNC: 12 U/L (ref 5–45)
BILIRUB SERPL-MCNC: 0.29 MG/DL (ref 0.2–1)
BUN SERPL-MCNC: 18 MG/DL (ref 5–25)
CALCIUM ALBUM COR SERPL-MCNC: 9.6 MG/DL (ref 8.3–10.1)
CALCIUM SERPL-MCNC: 9 MG/DL (ref 8.3–10.1)
CHLORIDE SERPL-SCNC: 105 MMOL/L (ref 96–108)
CHOLEST SERPL-MCNC: 158 MG/DL
CO2 SERPL-SCNC: 25 MMOL/L (ref 21–32)
CREAT SERPL-MCNC: 0.86 MG/DL (ref 0.6–1.3)
EST. AVERAGE GLUCOSE BLD GHB EST-MCNC: 183 MG/DL
ESTRADIOL SERPL-MCNC: 416 PG/ML (ref 11–52.5)
GLUCOSE P FAST SERPL-MCNC: 239 MG/DL (ref 65–99)
HBA1C MFR BLD: 8 %
HDLC SERPL-MCNC: 68 MG/DL
LDLC SERPL CALC-MCNC: 48 MG/DL (ref 0–100)
NONHDLC SERPL-MCNC: 90 MG/DL
POTASSIUM SERPL-SCNC: 4.2 MMOL/L (ref 3.5–5.3)
PROT SERPL-MCNC: 7 G/DL (ref 6.4–8.4)
PTH-INTACT SERPL-MCNC: 39.4 PG/ML (ref 18.4–80.1)
SODIUM SERPL-SCNC: 136 MMOL/L (ref 135–147)
T4 FREE SERPL-MCNC: 0.96 NG/DL (ref 0.76–1.46)
TRIGL SERPL-MCNC: 209 MG/DL
TSH SERPL DL<=0.05 MIU/L-ACNC: 2.08 UIU/ML (ref 0.45–4.5)
VIT B12 SERPL-MCNC: 308 PG/ML (ref 100–900)

## 2022-09-12 PROCEDURE — 82670 ASSAY OF TOTAL ESTRADIOL: CPT

## 2022-09-12 PROCEDURE — 82607 VITAMIN B-12: CPT

## 2022-09-12 PROCEDURE — 83970 ASSAY OF PARATHORMONE: CPT

## 2022-09-12 PROCEDURE — 84439 ASSAY OF FREE THYROXINE: CPT

## 2022-09-12 PROCEDURE — 80053 COMPREHEN METABOLIC PANEL: CPT

## 2022-09-12 PROCEDURE — 3052F HG A1C>EQUAL 8.0%<EQUAL 9.0%: CPT | Performed by: STUDENT IN AN ORGANIZED HEALTH CARE EDUCATION/TRAINING PROGRAM

## 2022-09-12 PROCEDURE — 83036 HEMOGLOBIN GLYCOSYLATED A1C: CPT

## 2022-09-12 PROCEDURE — 80061 LIPID PANEL: CPT

## 2022-09-12 PROCEDURE — 36415 COLL VENOUS BLD VENIPUNCTURE: CPT

## 2022-09-12 PROCEDURE — 84443 ASSAY THYROID STIM HORMONE: CPT

## 2022-09-19 ENCOUNTER — OFFICE VISIT (OUTPATIENT)
Dept: DERMATOLOGY | Facility: CLINIC | Age: 42
End: 2022-09-19
Payer: COMMERCIAL

## 2022-09-19 VITALS — HEIGHT: 66 IN | BODY MASS INDEX: 29.25 KG/M2 | TEMPERATURE: 96.9 F | WEIGHT: 182 LBS

## 2022-09-19 DIAGNOSIS — L68.0 HIRSUTISM: Primary | ICD-10-CM

## 2022-09-19 DIAGNOSIS — F64.0 TRANSGENDER PERSON ON HORMONE THERAPY: ICD-10-CM

## 2022-09-19 DIAGNOSIS — Z78.9 MALE-TO-FEMALE TRANSGENDER PERSON: ICD-10-CM

## 2022-09-19 DIAGNOSIS — Z79.899 TRANSGENDER PERSON ON HORMONE THERAPY: ICD-10-CM

## 2022-09-19 PROCEDURE — 99203 OFFICE O/P NEW LOW 30 MIN: CPT | Performed by: STUDENT IN AN ORGANIZED HEALTH CARE EDUCATION/TRAINING PROGRAM

## 2022-09-19 NOTE — PROGRESS NOTES
[facility] Note     Patient Name: Laurel Marie  Encounter ISTF:1062/52     Have you been cared for by a [facility] in the last 3 years and, if so, which one? No    · Have you traveled outside of the [place] in the past 3 months or outside of the [facility] in the last 2 weeks? No     May we call your Preferred Phone number to discuss your specific medical information? Yes     May we leave a detailed message that includes your specific medical information? Yes      Today's Chief Concerns:   Concern #1:  Laser hair removal consult      Past Medical History:  Have you personally ever had or currently have any of the following? · Skin cancer (such as Melanoma, Basal Cell Carcinoma, Squamous Cell Carcinoma? (If Yes, please provide more detail)- No  · Eczema: No  · Psoriasis: No  · HIV/AIDS: No  · Hepatitis B or C: No  · Tuberculosis: No  · Systemic Immunosuppression such as Diabetes, Biologic or Immunotherapy, Chemotherapy, Organ Transplantation, Bone Marrow Transplantation (If YES, please provide more detail): YES, diabetes type 1  · Radiation Treatment (If YES, please provide more detail): No  · Any other major medical conditions/concerns? (If Yes, which types)- No    Social History:     What is/was your primary occupation?  What are your hobbies/past-times? Family History:  Have any of your "first degree relatives" (parent, brother, sister, or child) had any of the following       · Skin cancer such as Melanoma or Merkel Cell Carcinoma or Pancreatic Cancer? No  · Eczema, Asthma, Hay Fever or Seasonal Allergies: No  · Psoriasis or Psoriatic Arthritis: No  · Do any other medical conditions seem to run in your family? If Yes, what condition and which relatives?   YES, mother and father has hx of diabetes, mother has hx of hypertension    Current Medications:   (please update all dermatological medications before printing patient's AVS!)      Current Outpatient Medications:     Continuous Blood Gluc  (FreeStyle Taj 2 Wilmot) ANNABEL, Use to test blood sugar daily, Disp: 1 each, Rfl: 0    Continuous Blood Gluc Sensor (FreeStyle Taj 2 Sensor) MISC, Use to test blood sugar 3 times a day, Disp: 6 each, Rfl: 3    CVS Glucose 4-6 GM-MG, CHEW 4 TABLETS (16 G TOTAL) AS NEEDED FOR LOW BLOOD SUGAR, Disp: , Rfl:     estradiol (Estrace) 1 mg tablet, Take one tablet (1 mg) along with one tablet (2 mg) for a total of 3 mg daily, Disp: 90 tablet, Rfl: 1    estradiol (ESTRACE) 2 MG tablet, Take 1 tablet (2 mg total) by mouth 2 (two) times a day, Disp: 180 tablet, Rfl: 3    glucagon (GLUCAGON EMERGENCY) 1 MG injection, Inject 1 mg under the skin once as needed for low blood sugar for up to 1 dose, Disp: 1 kit, Rfl: 0    InPen 946-Lrve-Kizvy ANNABEL, USE INPEN DEVICE TO ADMINISTER HUMALOG INSULIN, Disp: 1 each, Rfl: 0    insulin degludec (Tresiba FlexTouch) 100 units/mL injection pen, Inject 30 units under the skin every morning, Disp: 30 mL, Rfl: 1    insulin lispro (HumaLOG KwikPen) 100 units/mL injection pen, Use 7 units under the skin with breakfast and 5 units under the skin with lunch and dinner, Disp: 5 pen, Rfl: 3    Insulin Pen Needle (BD Pen Needle Kaykay 2nd Gen) 32G X 4 MM MISC, Use to inject insulin 4 times a day, Disp: 100 each, Rfl: 2    spironolactone (ALDACTONE) 50 mg tablet, Take 1 5 tablets (75 mg total) by mouth 2 (two) times a day, Disp: 270 tablet, Rfl: 3    CVS BISACODYL 5 MG EC tablet, TAKE 2 TABLETS (10 MG TOTAL) BY MOUTH ONCE FOR 1 DOSE (Patient not taking: Reported on 2/16/2022), Disp: , Rfl:     ergocalciferol (ERGOCALCIFEROL) 1 25 MG (47588 UT) capsule, Take one capsule by mouth once a week for 12 weeks (Patient not taking: Reported on 8/22/2022), Disp: 12 capsule, Rfl: 0    GAVILYTE-G 236 g solution, TAKE 4,000 ML BY MOUTH ONCE FOR 1 DOSE (Patient not taking: Reported on 11/4/2021), Disp: , Rfl:    glucose 4 g chewable tablet, Chew 4 tablets (16 g total) as needed for low blood sugar (Patient not taking: No sig reported), Disp: 100 tablet, Rfl: 3    INS SYRINGE/NEEDLE 1CC/28G (B-D INS SYR MICROFINE 1CC/28G) 28G X 1/2" 1 ML MISC, Use to inject insulin daily (Patient not taking: Reported on 6/22/2020), Disp: 100 each, Rfl: 3    insulin degludec Yahir Ovens FlexTouch) 100 units/mL injection pen, , Disp: , Rfl:     Insulin Lispro (HumaLOG) 100 units/mL cartridge for injection, INJECT 7 UNITS BEFORE BREAKFAST , THE 5 UNITS BEFORE LUNCH AND DINNER USING INPEN DEVICE (Patient not taking: Reported on 8/22/2022), Disp: 5 mL, Rfl: 3    linaCLOtide (Linzess) 290 MCG CAPS, Take 1 capsule by mouth daily, Disp: 30 capsule, Rfl: 5    naproxen (NAPROSYN) 500 mg tablet, Every 12 hours as needed for mild-mod pain; take with food (Patient not taking: Reported on 6/22/2020), Disp: 30 tablet, Rfl: 0      Review of Systems:  Have you recently had or currently have any of the following? If YES, what are you doing for the problem? · Fever, chills or unintended weight loss: No  · Sudden loss or change in your vision: No  · Nausea, vomiting or blood in your stool: No  · Painful or swollen joints: No  · Wheezing or cough: No  · Changing mole or non-healing wound: No  · Nosebleeds: No  · Excessive sweating: No  · Easy or prolonged bleeding? No  · Over the last 2 weeks, how often have you been bothered by the following problems? · Taking little interest or pleasure in doing things: 1 - Not at All  · Feeling down, depressed, or hopeless: 1 - Not at All  · Rapid heartbeat with epinephrine:  No    · FEMALES ONLY:    · Are you pregnant or planning to become pregnant? No  · Are you currently or planning to be nursing or breast feeding? No    · Any known allergies?       Allergies   Allergen Reactions    Other Allergic Rhinitis     SEASONAL ALLERGIES AND PET DANDER   ·       Physical Exam:     Was a chaperone (Derm Clinical Assistant) present throughout the entire Physical Exam? Yes     Did the Dermatology Team specifically  the patient on the importance of a Full Skin Exam to be sure that nothing is missed clinically? Yes}  o Did the patient ultimately request or accept a Full Skin Exam?  NO  o Did the patient specifically refuse to have the areas "under-the-bra" examined by the Dermatologist? No  o Did the patient specifically refuse to have the areas "under-the-underwear" examined by the Dermatologist? No    CONSTITUTIONAL:   Vitals:    09/19/22 0747   Temp: (!) 96 9 °F (36 1 °C)   TempSrc: Temporal   Weight: 82 6 kg (182 lb)   Height: 5' 6" (1 676 m)       PSYCH: Normal mood and affect  EYES: Normal conjunctiva  ENT: Normal lips and oral mucosa  CARDIOVASCULAR: No edema  RESPIRATORY: Normal respirations  HEME/LYMPH/IMMUNO:  No regional lymphadenopathy except as noted below in "ASSESSMENT AND PLAN BY DIAGNOSIS"    SKIN:  [address]   Face Normal except as noted below in Assessment   Neck, Cervical Chain Nodes Normal except as noted below in Assessment   Chest/Breasts Viewed areas Normal except as noted below in Assessment        Assessment and Plan by Diagnosis:    History of Present Condition:     Duration:  How long has this been an issue for you?    o  laser hair removal consult   Location Affected:  Where on the body is this affecting you?    o  face and neck and chest   Quality:  Is there any bleeding, pain, itch, burning/irritation, or redness associated with the skin lesion? o  denies   Severity:  Describe any bleeding, pain, itch, burning/irritation, or redness on a scale of 1 to 10 (with 10 being the worst)  o  0   Timing:  Does this condition seem to be there pretty constantly or do you notice it more at specific times throughout the day?     o  constant   Context:  Have you ever noticed that this condition seems to be associated with specific activities you do?    o  denies   Modifying Factors: o Anything that seems to make the condition worse?    -  denies  o What have you tried to do to make the condition better?    -  denies   Associated Signs and Symptoms:  Does this skin lesion seem to be associated with any of the following:     Hirsutism in a Male to Female Transgender patient on Estrogen therapy  Physical Exam:   Anatomic Location Affected:  Neck, face, upper chest    Morphological Description:  Anita Combs terminal hair visible along face, neck, chest   Pertinent Positives:   Pertinent Negatives: Additional History of Present Condition:  Pt is a transgender MTF, has discussed LHR for above areas with many places- has also discussed with her insurance and they have confirmed coverage  Assessment and Plan:  Based on a thorough discussion of this condition and the management approach to it (including a comprehensive discussion of the known risks, side effects and potential benefits of treatment), the patient (family) agrees to implement the following specific plan:    Plan to put [facility] through PA process and start once approved   Discussed LHR at length      LASER-HAIR REDUCTION INSTRUCTIONS    Prep-Care (what to do before your appointment)     Area must be shaved 24 hours prior to your appointment   The closer the shave the better   For bikini services, dont shave the part where you want to keep the hair   No makeup/lotion/deodorant on the day of your appointment (on treatment area)   Stay out of direct sunlight for at least 3 days prior to your appointment (& 3 days after)   Do not use self-berny or spray tan products for at least 2 weeks before your treatment to avoid potential injury   Avoid drinking more than 2 alcoholic beverages 24 hours before your treatment    Avoid waxing/threading/tweezing in the area for at least 4 weeks  Shaving is ok!     Post-Care     Redness & Bumps are normal    Immediately after your treatment, redness & bumps at the treatment area are common; these may last up to 2 hours or longer  It is normal for the treated area to feel like a sunburn for a few hours  You should use a cold compress if the sensitivity continues  If there is any crusting, apply an antibiotic cream  Darker pigmented skin may have more discomfort than lighter skin & may persist longer   Cleanse the area treated gently   The treated area may be washed gently with a mild soap  Skin should be patted dry & not rubbed during the first 48 hours   No makeup & lotion/moisturizer/deodorant for the first 24 hours   Keep the treated area clean & dry, if further redness or irritation persists, skip your makeup & moisturizer, & deodorant (for underarms) until the irritation has subsided   Dead hairs will begin to shed 5-30 days after your treatment   Stubble, representing dead hair being shed from the hair follicle, will appear within 5-30 days from the treatment date  that is normal & they will fall out quickly   Exfoliate to speed up hair shedding   Anywhere from 5-30 days after the treatment, shedding of the hair may occur & this may appear as new hair growth  It is not new hair growth, but the dead hair pushing its way out of the follicle  You can help the hair come out by using light exfoliation with a washcloth  & shaving   Avoid the sun   Avoid sun exposure to reduce the chance of dark or light spots for 2 months  Use sunscreen (spf 30 or higher) at all times throughout the treatment period & for 1-2 months following   Do not pick/scratch/wax/thread/tweeze the area   Avoid picking or scratching the treated skin  do not use any other hair removal methods or products, other than shaving, on the treated area during the course of your laser treatments, as it will prevent you from achieving the best results   Hair growth varies     On average, most will experience a level of hairless happiness after around 6, up to 9 sessions which will result in not even checking for stubble      Scribe Attestation    I,:  Yobany Abel am acting as a scribe while in the presence of the attending physician :       I,:  Elvin Nicholas MD personally performed the services described in this documentation    as scribed in my presence : Attending and PA/NP shared services statement (NON-critical care):

## 2022-09-19 NOTE — PATIENT INSTRUCTIONS
LASER-HAIR REDUCTION INSTRUCTIONS    Prep-Care (what to do before your appointment)    Area must be shaved 24 hours prior to your appointment  The closer the shave the better  For bikini services, dont shave the part where you want to keep the hair  No makeup/lotion/deodorant on the day of your appointment (on treatment area)  Stay out of direct sunlight for at least 3 days prior to your appointment (& 3 days after)  Do not use self-berny or spray tan products for at least 2 weeks before your treatment to avoid potential injury  Avoid drinking more than 2 alcoholic beverages 24 hours before your treatment   Avoid waxing/threading/tweezing in the area for at least 4 weeks  Shaving is ok! Post-Care    Redness & Bumps are normal   Immediately after your treatment, redness & bumps at the treatment area are common; these may last up to 2 hours or longer  It is normal for the treated area to feel like a sunburn for a few hours  You should use a cold compress if the sensitivity continues  If there is any crusting, apply an antibiotic cream  Darker pigmented skin may have more discomfort than lighter skin & may persist longer  Cleanse the area treated gently  The treated area may be washed gently with a mild soap  Skin should be patted dry & not rubbed during the first 48 hours  No makeup & lotion/moisturizer/deodorant for the first 24 hours  Keep the treated area clean & dry, if further redness or irritation persists, skip your makeup & moisturizer, & deodorant (for underarms) until the irritation has subsided  Dead hairs will begin to shed 5-30 days after your treatment  Stubble, representing dead hair being shed from the hair follicle, will appear within 5-30 days from the treatment date  that is normal & they will fall out quickly  Exfoliate to speed up hair shedding  Anywhere from 5-30 days after the treatment, shedding of the hair may occur & this may appear as new hair growth   It is not new hair growth, but the dead hair pushing its way out of the follicle  You can help the hair come out by using light exfoliation with a washcloth  & shaving  Avoid the sun  Avoid sun exposure to reduce the chance of dark or light spots for 2 months  Use sunscreen (spf 30 or higher) at all times throughout the treatment period & for 1-2 months following  Do not pick/scratch/wax/thread/tweeze the area  Avoid picking or scratching the treated skin  do not use any other hair removal methods or products, other than shaving, on the treated area during the course of your laser treatments, as it will prevent you from achieving the best results  Hair growth varies  On average, most will experience a level of hairless happiness after around 6, up to 9 sessions which will result in not even checking for stubble

## 2022-09-20 ENCOUNTER — TELEPHONE (OUTPATIENT)
Dept: DERMATOLOGY | Facility: CLINIC | Age: 42
End: 2022-09-20

## 2022-09-20 NOTE — TELEPHONE ENCOUNTER
Phone call to patient insurance [address] [place] [address] at [phone], spoke with Juanis she advised that no PA is needed for laser hair removal treatments using the Alexandrite laser for patient gave CPT code 481 1731, diagnosis code- Hirsutism L68 0, REF # [id number]

## 2022-09-27 DIAGNOSIS — E10.65 TYPE 1 DIABETES MELLITUS WITH HYPERGLYCEMIA (HCC): ICD-10-CM

## 2022-09-27 RX ORDER — FLASH GLUCOSE SENSOR
KIT MISCELLANEOUS
Qty: 6 EACH | Refills: 3 | Status: SHIPPED | OUTPATIENT
Start: 2022-09-27

## 2022-10-07 ENCOUNTER — DOCUMENTATION (OUTPATIENT)
Dept: ENDOCRINOLOGY | Facility: CLINIC | Age: 42
End: 2022-10-07

## 2022-10-07 DIAGNOSIS — Z78.9 TRANSGENDER: Primary | ICD-10-CM

## 2022-10-24 ENCOUNTER — PATIENT MESSAGE (OUTPATIENT)
Dept: DERMATOLOGY | Facility: CLINIC | Age: 42
End: 2022-10-24

## 2022-10-25 NOTE — TELEPHONE ENCOUNTER
From: Areli Padilla  To: Agnes Hardin  Sent: 10/24/2022 1:45 PM EDT  Subject: Laser hair reduction     I have an appointment next Monday for laser hair reduction  I lost the paperwork about preparing for this procedure  Should I shave in advance of this or should I not?  Thanks in advance

## 2022-10-31 ENCOUNTER — PROCEDURE VISIT (OUTPATIENT)
Dept: DERMATOLOGY | Facility: CLINIC | Age: 42
End: 2022-10-31

## 2022-10-31 VITALS — BODY MASS INDEX: 29.41 KG/M2 | WEIGHT: 183 LBS | HEIGHT: 66 IN

## 2022-10-31 DIAGNOSIS — L68.0 HIRSUTISM: Primary | ICD-10-CM

## 2022-10-31 DIAGNOSIS — Z78.9 MALE-TO-FEMALE TRANSGENDER PERSON: ICD-10-CM

## 2022-10-31 NOTE — PROGRESS NOTES
Alexandrite Laser Treatment  10/31/2022    Device: Kalyn Gentle Max Pro  Place of Treatment: [employer]: Cory Chavez MD  Assistant: Tom Castillo    Treatment number: 1  Site of treatment: jaw lline, chest  Skin type: Elenor Edge 2    Pre-operative Diagnosis:   Indications for Surgery:    Post-operative Diagnosis: same as pre-operative    Anesthetic: Victoriano cooler     Safety Precautions:  Fire extinguisher persent/Window covered/Staff and patient eyes covered/Warning sign posted     Interim History/Comments:  none  Percent Improvement: N/A, first treatment    Parameters: Laser was set to 755nm    Face, Neck:  Fluence: 9 J/cm2  Pulse duration: 3 msec  Spot size: 24 mm  Pulse count: 340     Chest:  Fluence: 7 J/cm2  Pulse duration: 3 msec  Spot size: 24 mm  Pulse count: 340     Procedure Note:   After discussing potential procedure related risks including but not limited to pain, purpura, blistering, scarring, discoloration, “footprinting,” recurrence, inefficacy, need for additional treatment, and undesirable cosmetic results, written and verbal consent were obtained  Patient was brought back to the operating room  Time out was performed  Patient's name, identification and intended procedure were verified  Prior to the procedure, the patient cleansed the treatment area  The treatment area was re-cleansed with alcohol  Eye coverings eye shield inserted/placed  The cooling device was set to 40 msec/40 msec     Tolerance: Patient tolerated the procedure well with no immediate significant complications and left in stable condition  Chest was very sensitive for her as she is on hormone therapy currently  Complications: none  Other procedures: none  Photography: yes    Post-op Care: Aftercare was discussed  Continue to ice at home and keep the area moist with a gentle moisturizer   Advised the patient to avoid exercise for the next 24 hours and also to be cautious with sun exposure over the next week  Advised patient to call the office if there is excessive swelling  Follow-up:  Patient is aware that it will take multiple treatments to treat this condition  Return to clinic for re-treatment in 4-6 weeks  THIS WAS NOT A COSMETIC TREATMENT PRIOR AUTHORIZATION WAS DONE PRIOR TO PROCEDURE    LASER-HAIR REDUCTION INSTRUCTIONS    Post-Care    • Redness & Bumps are normal   • Immediately after your treatment, redness & bumps at the treatment area are common; these may last up to 2 hours or longer  It is normal for the treated area to feel like a sunburn for a few hours  You should use a cold compress if the sensitivity continues  If there is any crusting, apply an antibiotic cream  Darker pigmented skin may have more discomfort than lighter skin & may persist longer  • Cleanse the area treated gently  • The treated area may be washed gently with a mild soap  Skin should be patted dry & not rubbed during the first 48 hours  • No makeup & lotion/moisturizer/deodorant for the first 24 hours  • Keep the treated area clean & dry, if further redness or irritation persists, skip your makeup & moisturizer, & deodorant (for underarms) until the irritation has subsided  • Dead hairs will begin to shed 5-30 days after your treatment  • Stubble, representing dead hair being shed from the hair follicle, will appear within 5-30 days from the treatment date  that is normal & they will fall out quickly  • Exfoliate to speed up hair shedding  • Anywhere from 5-30 days after the treatment, shedding of the hair may occur & this may appear as new hair growth  It is not new hair growth, but the dead hair pushing its way out of the follicle  You can help the hair come out by using light exfoliation with a washcloth  & shaving  • Avoid the sun  • Avoid sun exposure to reduce the chance of dark or light spots for 2 months   Use sunscreen (spf 30 or higher) at all times throughout the treatment period & for 1-2 months following  • Do not pick/scratch/wax/thread/tweeze the area  • Avoid picking or scratching the treated skin  do not use any other hair removal methods or products, other than shaving, on the treated area during the course of your laser treatments, as it will prevent you from achieving the best results  Hair growth varies       Scribe Attestation    I,:  Ariel Duggan am acting as a scribe while in the presence of the attending physician :       I,:  Kody Abebe MD personally performed the services described in this documentation    as scribed in my presence :

## 2022-10-31 NOTE — PATIENT INSTRUCTIONS
Post-op Care: Aftercare was discussed  Continue to ice at home and keep the area moist with a gentle moisturizer  Advised the patient to avoid exercise for the next 24 hours and also to be cautious with sun exposure over the next week  Advised patient to call the office if there is excessive swelling  Follow-up:  Patient is aware that it will take multiple treatments to treat this condition  Return to clinic for re-treatment in 4-6 weeks  THIS WAS NOT A COSMETIC TREATMENT PRIOR AUTHORIZATION WAS DONE PRIOR TO PROCEDURE    LASER-HAIR REDUCTION INSTRUCTIONS    Post-Care    Redness & Bumps are normal   Immediately after your treatment, redness & bumps at the treatment area are common; these may last up to 2 hours or longer  It is normal for the treated area to feel like a sunburn for a few hours  You should use a cold compress if the sensitivity continues  If there is any crusting, apply an antibiotic cream  Darker pigmented skin may have more discomfort than lighter skin & may persist longer  Cleanse the area treated gently  The treated area may be washed gently with a mild soap  Skin should be patted dry & not rubbed during the first 48 hours  No makeup & lotion/moisturizer/deodorant for the first 24 hours  Keep the treated area clean & dry, if further redness or irritation persists, skip your makeup & moisturizer, & deodorant (for underarms) until the irritation has subsided  Dead hairs will begin to shed 5-30 days after your treatment  Stubble, representing dead hair being shed from the hair follicle, will appear within 5-30 days from the treatment date  that is normal & they will fall out quickly  Exfoliate to speed up hair shedding  Anywhere from 5-30 days after the treatment, shedding of the hair may occur & this may appear as new hair growth  It is not new hair growth, but the dead hair pushing its way out of the follicle   You can help the hair come out by using light exfoliation with a washcloth & shaving  Avoid the sun  Avoid sun exposure to reduce the chance of dark or light spots for 2 months  Use sunscreen (spf 30 or higher) at all times throughout the treatment period & for 1-2 months following  Do not pick/scratch/wax/thread/tweeze the area  Avoid picking or scratching the treated skin  do not use any other hair removal methods or products, other than shaving, on the treated area during the course of your laser treatments, as it will prevent you from achieving the best results  Hair growth varies

## 2022-11-07 ENCOUNTER — APPOINTMENT (OUTPATIENT)
Dept: LAB | Facility: CLINIC | Age: 42
End: 2022-11-07

## 2022-11-07 DIAGNOSIS — Z78.9 TRANSGENDER: ICD-10-CM

## 2022-11-07 LAB — ESTRADIOL SERPL-MCNC: 135 PG/ML (ref 11–52.5)

## 2022-11-08 LAB
TESTOST FREE SERPL-MCNC: 1.9 PG/ML (ref 6.8–21.5)
TESTOST SERPL-MCNC: 14 NG/DL (ref 264–916)

## 2022-11-14 DIAGNOSIS — E10.65 TYPE 1 DIABETES MELLITUS WITH HYPERGLYCEMIA (HCC): ICD-10-CM

## 2022-11-14 RX ORDER — PEN NEEDLE, DIABETIC 32GX 5/32"
NEEDLE, DISPOSABLE MISCELLANEOUS
Qty: 100 EACH | Refills: 2 | Status: CANCELLED | OUTPATIENT
Start: 2022-11-14

## 2022-11-14 RX ORDER — PEN NEEDLE, DIABETIC 32GX 5/32"
NEEDLE, DISPOSABLE MISCELLANEOUS
Qty: 100 EACH | Refills: 2 | Status: SHIPPED | OUTPATIENT
Start: 2022-11-14

## 2022-11-15 ENCOUNTER — TELEPHONE (OUTPATIENT)
Dept: DERMATOLOGY | Facility: CLINIC | Age: 42
End: 2022-11-15

## 2022-11-15 NOTE — TELEPHONE ENCOUNTER
Phone back from patient, patient is aware of Dr Maggy Lakhani  schedule, advised that Mariela and I are going to keep an eye out for any cancellations and will call patient if there is      ----- Message from Nilay Villagomez MD sent at 11/14/2022  4:53 PM EST -----  Regarding: RE: 4-6 week follow  up to laser hair removal  It would be for a laser  Can we give her name to Mariela to see if someone cancels we can add her in? I have been overbooking a few and want to try and avoid super over -booking! TY!!  ----- Message -----  From: Christina Sierra  Sent: 11/14/2022   8:41 AM EST  To: Nilay Villagomez MD  Subject: 4-6 week follow  up to laser hair removal        Good morning Dr Darvin Payton :)    I'm still trying to look for an appointment for [facility] [place], she was our laser hair removal patient and you wanted to see her back in 4-6 weeks  You have no openings in your schedule at [facility] for that time and that's the closest for her  Would it be ok to either double book her somewhere or put her with another provider for the follow up  Also just to double check it would be just for a follow appointment and not to do another laser treatment, correct?     Thanks   Meron Parham :)

## 2022-11-18 NOTE — RESULT ENCOUNTER NOTE
The estradiol level is in the target range and the testosterone levels are low  Continue current estrogen therapy

## 2022-12-10 ENCOUNTER — TELEPHONE (OUTPATIENT)
Dept: OTHER | Facility: OTHER | Age: 42
End: 2022-12-10

## 2022-12-10 DIAGNOSIS — E13.9 LADA (LATENT AUTOIMMUNE DIABETES IN ADULTS), MANAGED AS TYPE 1 (HCC): ICD-10-CM

## 2022-12-10 DIAGNOSIS — E10.65 TYPE 1 DIABETES MELLITUS WITH HYPERGLYCEMIA (HCC): ICD-10-CM

## 2022-12-10 NOTE — TELEPHONE ENCOUNTER
Patient is requesting a refill of the following medication  She states she is on her last cartridge and it may last until next business day  Please reach out the patient for more information      Insulin Lispro (HumaLOG) 100 units/mL cartridge for injection

## 2022-12-12 RX ORDER — INSULIN LISPRO 100 [IU]/ML
INJECTION, SOLUTION INTRAVENOUS; SUBCUTANEOUS
Qty: 15 ML | Refills: 3 | Status: SHIPPED | OUTPATIENT
Start: 2022-12-12

## 2022-12-26 DIAGNOSIS — E13.9 LATENT AUTOIMMUNE DIABETES IN ADULTS (LADA), MANAGED AS TYPE 1 (HCC): ICD-10-CM

## 2022-12-26 DIAGNOSIS — E10.65 TYPE 1 DIABETES MELLITUS WITH HYPERGLYCEMIA (HCC): ICD-10-CM

## 2022-12-27 RX ORDER — INSULIN DEGLUDEC INJECTION 100 U/ML
INJECTION, SOLUTION SUBCUTANEOUS
Qty: 30 ML | Refills: 3 | Status: SHIPPED | OUTPATIENT
Start: 2022-12-27

## 2022-12-29 ENCOUNTER — PROCEDURE VISIT (OUTPATIENT)
Dept: DERMATOLOGY | Facility: CLINIC | Age: 42
End: 2022-12-29

## 2022-12-29 VITALS — BODY MASS INDEX: 30.05 KG/M2 | HEIGHT: 66 IN | WEIGHT: 187 LBS

## 2022-12-29 DIAGNOSIS — Z78.9 TRANSGENDER: ICD-10-CM

## 2022-12-29 DIAGNOSIS — F64.9 GENDER DYSPHORIA: Primary | ICD-10-CM

## 2022-12-29 NOTE — PROGRESS NOTES
[facility]  12/29/2022    Device: Kalyn Gentle Max Pro  Place of Treatment: [facility]: Natalie Ribera MD  Assistant: Scotty Banks    Treatment number: 2  Site of treatment: Face and neck and chest  Skin type: Pattie Caper 2    Pre-operative Diagnosis:   Indications for Surgery:    Post-operative Diagnosis: same as pre-operative    Anesthetic: Victoriano cooler     Safety Precautions:  Fire extinguisher persent/Window covered/Staff and patient eyes covered/Warning sign posted     Interim History/Comments:  none  Percent Improvement: 10%    Parameters: Laser was set to 755nm    Face; Neck   Fluence: 9 J/cm2  Pulse duration: 3 msec  Spot size: 24 mm    Chest  Fluence: 7 J/cm2  Pulse duration: 3 msec  Spot size: 24 mm    Pulse count: 388, combined      Procedure Note:   After discussing potential procedure related risks including but not limited to pain, purpura, blistering, scarring, discoloration, “footprinting,” recurrence, inefficacy, need for additional treatment, and undesirable cosmetic results, written and verbal consent were obtained  Patient was brought back to the operating room  Time out was performed  Patient's name, identification and intended procedure were verified  Prior to the procedure, the patient cleansed the treatment area  The treatment area was re-cleansed with alcohol  Eye coverings eye shield inserted/placed  The cooling device was set to 40 msec/40 msec      Tolerance: Patient tolerated the procedure well with no immediate significant complications and left in stable condition  Complications: none  Other procedures: none  Photography: no    Post-op Care: Aftercare was discussed  Continue to ice at home and keep the area moist with a gentle moisturizer  Advised the patient to avoid exercise for the next 24 hours and also to be cautious with sun exposure over the next week   Advised patient to call the office if there is excessive swelling  Follow-up:  Patient is aware that it will take multiple treatments to treat this condition  Return to clinic for re-treatment in 4-6 weeks  THIS WAS NOT A COSMETIC TREATMENT  PRIOR AUITHORIZATION WAS DONE PRIOR TO PROCEDURE           Scribe Attestation    I,:  Jeremiah Titus am acting as a scribe while in the presence of the attending physician :       I,:  Pastor Meraz MD personally performed the services described in this documentation    as scribed in my presence :

## 2022-12-29 NOTE — PATIENT INSTRUCTIONS
LASER HAIR REDUCTION PATIENT INSTRUCTIONS    PRE-TREATMENT INSTRUCTIONS    Avoid the sun 4-6 weeks before and after treatment or as otherwise directed by your healthcare provider  Your provider may ask you to stop any topical medications or skin care products 3-5 days prior to treatment  You MUST avoid bleaching, plucking or waxing hair for 4-6 weeks prior to treatment  If you have had a history of perioral or genital herpes simplex virus, your provider may recommend  prophylactic antiviral therapy  Follow the directions for your particular antiviral medication  If you have a tan or have a darker skin type, a bleaching regimen can be started 4-6 weeks before treatment  RECENTLY TANNED SKIN CANNOT BE TREATED! If treated within two weeks of active (natural sunlight or tanning becker) tanning, you may develop white spots after treatment and this may not clear for 2-3 months or longer  The use of self- tanning skin products must be discontinued one week before treatment  Any residual self- berny should be removed prior to treatment  INTRA-TREATMENT CARE    The skin is cleaned and shaved prior to treatment  The use of a topical anesthetic is optional   When treating the upper lip, the teeth can be protected with moist white gauze  The gauze also serves to support the lip during treatment, allowing a surface to push against   The DCD (cryogen cooling device), will be used with the laser to cool the skin during treatment  Safety considerations are important during the laser procedure  Laser safety protective eye wear MUST be worn by the patient and all personnel in the treatment room during the procedure to reduce the chance of damage to the eye  In addition, your provider will take all necessary precautions to ensure your safety  POST-TREATMENT CARE    Immediately after treatment, there should be redness and edema swelling of each hair follicle in the treatment site, which may last up to two hours, or longer  The erythema may last up to 2-3 days  Th e treated area will feel like a sunburn for a few hours after treatment  You can use topical hydrocortisone or Benadryl to alleviate the discomfort  An oral antihistamine can also be used  Non-drowsy antihistamine (such as Claritin) can be taken during the day to avoid the drowsiness associated with Benadryl  Your provider may use an optional  ling method after treatment to ensure your comfort  A topical soothing skin care product, such as aloe vera gel, can be applied following treatment if desired  Makeup can be used immediately after the treatment, as long as the skin is not irritated  Avoid sun exposure to reduce the chance of hyperpigmentation (darker pigmentation)  Use a sunblock (SPF 30+) at all times throughout the course of treatment  Avoid picking or scratching the treated skin  After the laser treatment is performed, do not use any other hair removal treatment products or similar treatments (waxing, electrolysis or tweezing) that will disturb the hair follicle in the treatment area for 4-6 weeks  Shaving is the preferred method  Anywhere from 10-21 days after the treatment, shedding of the treated hair may occur and this appears as new hair growth  This is NOT new hair growth  You can clean and remove the hair by washing or wiping the area with a wet cloth or Loofa sponge  After the underarms are treated, you may wish to use a powder instead of a deodorant for 24 hours after the treatment to reduce skin irritation  There are no restrictions on bathing except to treat the skin gently for the first 24 hours, as if you had a sunburn  Return to the office or call for an appointment at the first sign of the return of hair growth  This may be within 4-6 weeks for the upper body and possibly as long as 2-3 months for the lower body  Hair re-growth occurs at different rates on different areas of the body   New hair growth will not occur for AT LEAST three weeks after treatment  Call us at [phone] with any questions or concerns you may have

## 2023-01-04 ENCOUNTER — OFFICE VISIT (OUTPATIENT)
Dept: FAMILY MEDICINE CLINIC | Facility: CLINIC | Age: 43
End: 2023-01-04

## 2023-01-04 VITALS
HEART RATE: 88 BPM | BODY MASS INDEX: 30.13 KG/M2 | SYSTOLIC BLOOD PRESSURE: 114 MMHG | OXYGEN SATURATION: 97 % | WEIGHT: 187.5 LBS | TEMPERATURE: 98.8 F | HEIGHT: 66 IN | DIASTOLIC BLOOD PRESSURE: 78 MMHG

## 2023-01-04 DIAGNOSIS — Z11.59 NEED FOR HEPATITIS C SCREENING TEST: ICD-10-CM

## 2023-01-04 DIAGNOSIS — Z00.00 ANNUAL PHYSICAL EXAM: Primary | ICD-10-CM

## 2023-01-04 DIAGNOSIS — N52.2 DRUG-INDUCED ERECTILE DYSFUNCTION: ICD-10-CM

## 2023-01-04 DIAGNOSIS — R53.83 OTHER FATIGUE: ICD-10-CM

## 2023-01-04 DIAGNOSIS — E10.65 TYPE 1 DIABETES MELLITUS WITH HYPERGLYCEMIA (HCC): ICD-10-CM

## 2023-01-04 RX ORDER — SILDENAFIL 50 MG/1
50 TABLET, FILM COATED ORAL DAILY PRN
Qty: 10 TABLET | Refills: 0 | Status: SHIPPED | OUTPATIENT
Start: 2023-01-04

## 2023-01-04 NOTE — PROGRESS NOTES
ADULT ANNUAL [address]    NAME: Dany Hanks  AGE: 40 y o  SEX: adult  : 1980     DATE: 2023     Assessment and Plan:     Problem List Items Addressed This Visit        Endocrine    Type 1 diabetes mellitus with hyperglycemia ([address] )    Relevant Orders    Comprehensive metabolic panel    Microalbumin / creatinine urine ratio    HEMOGLOBIN A1C W/ EAG ESTIMATION   Other Visit Diagnoses     Annual physical exam    -  Primary    Other fatigue        Relevant Orders    CBC and Platelet    TSH, 3rd generation with Free T4 reflex    Vitamin D 25 hydroxy    Need for hepatitis C screening test        Relevant Orders    Hepatitis C antibody    BMI 30 0-30 9,adult        Relevant Orders    Lipid panel    Drug-induced erectile dysfunction        Relevant Medications    sildenafil (VIAGRA) 50 MG tablet          Immunizations and preventive care screenings were discussed with patient today  Appropriate education was printed on patient's after visit summary  Counseling:  Alcohol/drug use: discussed moderation in alcohol intake, the recommendations for healthy alcohol use, and avoidance of illicit drug use  Dental Health: discussed importance of regular tooth brushing, flossing, and dental visits  Injury prevention: discussed safety/seat belts, safety helmets, smoke detectors, carbon dioxide detectors, and smoking near bedding or upholstery  Exercise: the importance of regular exercise/physical activity was discussed  Recommend exercise 3-5 times per week for at least 30 minutes  BMI Counseling: Body mass index is 30 26 kg/m²  The BMI is above normal  Nutrition recommendations include decreasing portion sizes, consuming healthier snacks, reducing intake of saturated and trans fat and reducing intake of cholesterol  Exercise recommendations include moderate physical activity 150 minutes/week  No pharmacotherapy was ordered   Patient referred to PCP  Rationale for BMI follow-up plan is due to patient being overweight or obese  Depression Screening and Follow-up Plan: Patient was screened for depression during today's encounter  They screened negative with a PHQ-2 score of 0  Return in about 1 year (around 1/4/2024) for Annual physical      Chief Complaint:     No chief complaint on file  History of Present Illness:     Adult Annual Physical   Patient here for a comprehensive physical exam  The patient reports no problems  Diet and Physical Activity  Diet/Nutrition: well balanced diet and Diabetic diet  Exercise: 1-2 times a week on average  Depression Screening  PHQ-2/9 Depression Screening    Little interest or pleasure in doing things: 0 - not at all  Feeling down, depressed, or hopeless: 0 - not at all  PHQ-2 Score: 0  PHQ-2 Interpretation: Negative depression screen       General Health  Sleep: gets 7-8 hours of sleep on average  Hearing: decreased - right  Worse in loud environment  Not debilitating   Vision: no vision problems and [facility]- due for diabetic eye exam     Dental: no dental visits for >1 year and will establish with dentist          Health  Symptoms include: erectile dysfunction  Would like to start ED medications  Endocrinologist did not answer regarding ED medications erectile dysfunction likely secondary to patient being on estrogen  Review of Systems:     Review of Systems   Past Medical History:     Past Medical History:   Diagnosis Date   • Allergic    • Diabetes mellitus ([facility] [address] ) 10/25/2019      Past Surgical History:     History reviewed  No pertinent surgical history     Family History:     Family History   Problem Relation Age of Onset   • Hypertension Mother    • Diabetes Mother    • Diabetes Father    • Dementia Father    • Substance Abuse Brother       Social History:     Social History     Socioeconomic History   • Marital status: /Civil Union     Spouse name: None   • Number of children: None   • Years of education: None   • Highest education level: None   Occupational History   • None   Tobacco Use   • Smoking status: Former     Packs/day: 0 50     Years: 20 00     Pack years: 10 00     Types: Cigarettes     Start date: 1998     Quit date: 11/15/2018     Years since quittin 1   • Smokeless tobacco: Never   Vaping Use   • Vaping Use: Never used   Substance and Sexual Activity   • Alcohol use: Yes     Comment: social drinker   • Drug use: No   • Sexual activity: Yes     Partners: Female, Male     Birth control/protection: Condom Male   Other Topics Concern   • None   Social History Narrative   • None     Social Determinants of Health     Financial Resource Strain: Not on file   Food Insecurity: Not on file   Transportation Needs: Not on file   Physical Activity: Not on file   Stress: Not on file   Social Connections: Not on file   Intimate Partner Violence: Not on file   Housing Stability: Not on file      Current Medications:     Current Outpatient Medications   Medication Sig Dispense Refill   • Continuous Blood Gluc  (FreeStyle Orozco 2 [place]) ANNABEL Use to test blood sugar daily 1 each 0   • Continuous Blood Gluc Sensor (FreeStyle Taj 2 Sensor) MISC Use to test blood sugar 3 times a day 6 each 3   • estradiol (ESTRACE) 2 MG tablet Take 1 tablet (2 mg total) by mouth 2 (two) times a day (Patient taking differently: Take 2 mg by mouth once Take one tablet (2 mg) daily) 180 tablet 3   • HumaLOG 100 units/mL cartridge for injection INJECT 7 UNITS BEFORE BREAKFAST , THE 5 UNITS BEFORE LUNCH AND DINNER USING INPEN DEVICE 15 mL 3   • InPen 100-Blue-Milagros ANNABEL USE INPEN DEVICE TO ADMINISTER HUMALOG INSULIN 1 each 0   • insulin degludec Girtha Camel FlexTouch) 100 units/mL injection pen      • insulin lispro (HumaLOG KwikPen) 100 units/mL injection pen Use 7 units under the skin with breakfast and 5 units under the skin with lunch and dinner 5 pen 3   • Insulin Pen Needle (BD Pen Needle Kaykay 2nd Gen) 32G X 4 MM MISC Use to inject insulin 4 times a day 100 each 2   • sildenafil (VIAGRA) 50 MG tablet Take 1 tablet (50 mg total) by mouth daily as needed for erectile dysfunction 10 tablet 0   • spironolactone (ALDACTONE) 50 mg tablet Take 1 5 tablets (75 mg total) by mouth 2 (two) times a day 270 tablet 3   • Tresiba FlexTouch 100 units/mL injection pen INJECT 30 UNITS UNDER THE SKIN EVERY MORNING (Patient taking differently: 29 Units Inject 29 units under the skin every morning) 30 mL 3   • [facility] Glucose 4-6 GM-MG CHEW 4 TABLETS (16 G TOTAL) AS NEEDED FOR LOW BLOOD SUGAR     • estradiol (Estrace) 1 mg tablet Take one tablet (1 mg) along with one tablet (2 mg) for a total of 3 mg daily (Patient not taking: Reported on 12/29/2022) 90 tablet 1   • glucagon (GLUCAGON EMERGENCY) 1 MG injection Inject 1 mg under the skin once as needed for low blood sugar for up to 1 dose 1 kit 0   • glucose 4 g chewable tablet Chew 4 tablets (16 g total) as needed for low blood sugar (Patient not taking: Reported on 6/17/2021) 100 tablet 3     No current facility-administered medications for this visit  Allergies: Allergies   Allergen Reactions   • Other Allergic Rhinitis     SEASONAL ALLERGIES AND PET DANDER      Physical Exam:     /78 (BP Location: Left arm, Patient Position: Sitting, Cuff Size: Standard)   Pulse 88   Temp 98 8 °F (37 1 °C)   Ht 5' 6" (1 676 m)   Wt 85 kg (187 lb 8 oz)   SpO2 97%   BMI 30 26 kg/m²     Physical Exam  Vitals and nursing note reviewed  Constitutional:       General: She is not in acute distress  Appearance: Normal appearance  She is well-developed  HENT:      Head: Normocephalic and atraumatic  Nose: Nose normal    Eyes:      Extraocular Movements: Extraocular movements intact  Conjunctiva/sclera: Conjunctivae normal       Pupils: Pupils are equal, round, and reactive to light     Cardiovascular:      Rate and Rhythm: Normal rate and regular rhythm  Heart sounds: Normal heart sounds  Pulmonary:      Effort: Pulmonary effort is normal       Breath sounds: Normal breath sounds  Abdominal:      General: Bowel sounds are normal       Palpations: Abdomen is soft  Musculoskeletal:         General: Normal range of motion  Cervical back: Normal range of motion  Skin:     General: Skin is warm and dry  Neurological:      General: No focal deficit present  Mental Status: She is alert and oriented to person, place, and time     Psychiatric:         Mood and Affect: Mood normal          Speech: Speech normal          Behavior: Behavior normal           Mario Mart MD  [id number]

## 2023-01-04 NOTE — PATIENT INSTRUCTIONS

## 2023-01-05 ENCOUNTER — LAB (OUTPATIENT)
Dept: LAB | Facility: CLINIC | Age: 43
End: 2023-01-05

## 2023-01-05 DIAGNOSIS — Z11.59 NEED FOR HEPATITIS C SCREENING TEST: ICD-10-CM

## 2023-01-05 DIAGNOSIS — R53.83 OTHER FATIGUE: ICD-10-CM

## 2023-01-05 DIAGNOSIS — E10.65 TYPE 1 DIABETES MELLITUS WITH HYPERGLYCEMIA (HCC): ICD-10-CM

## 2023-01-05 LAB
25(OH)D3 SERPL-MCNC: 18.5 NG/ML (ref 30–100)
ALBUMIN SERPL BCP-MCNC: 3.3 G/DL (ref 3.5–5)
ALP SERPL-CCNC: 49 U/L (ref 46–116)
ALT SERPL W P-5'-P-CCNC: 23 U/L (ref 12–78)
ANION GAP SERPL CALCULATED.3IONS-SCNC: 7 MMOL/L (ref 4–13)
AST SERPL W P-5'-P-CCNC: 17 U/L (ref 5–45)
BILIRUB SERPL-MCNC: 0.22 MG/DL (ref 0.2–1)
BUN SERPL-MCNC: 14 MG/DL (ref 5–25)
CALCIUM ALBUM COR SERPL-MCNC: 10.3 MG/DL (ref 8.3–10.1)
CALCIUM SERPL-MCNC: 9.7 MG/DL (ref 8.3–10.1)
CHLORIDE SERPL-SCNC: 107 MMOL/L (ref 96–108)
CHOLEST SERPL-MCNC: 178 MG/DL
CO2 SERPL-SCNC: 26 MMOL/L (ref 21–32)
CREAT SERPL-MCNC: 0.72 MG/DL (ref 0.6–1.3)
CREAT UR-MCNC: 157 MG/DL
ERYTHROCYTE [DISTWIDTH] IN BLOOD BY AUTOMATED COUNT: 12 % (ref 11.6–15.1)
GLUCOSE P FAST SERPL-MCNC: 103 MG/DL (ref 65–99)
HCT VFR BLD AUTO: 40.2 % (ref 36.5–46.1)
HCV AB SER QL: NORMAL
HDLC SERPL-MCNC: 73 MG/DL
HGB BLD-MCNC: 12.9 G/DL (ref 12–15.4)
LDLC SERPL CALC-MCNC: 81 MG/DL (ref 0–100)
MCH RBC QN AUTO: 30.1 PG (ref 26.8–34.3)
MCHC RBC AUTO-ENTMCNC: 32.1 G/DL (ref 31.4–37.4)
MCV RBC AUTO: 94 FL (ref 82–98)
MICROALBUMIN UR-MCNC: 25.9 MG/L (ref 0–20)
MICROALBUMIN/CREAT 24H UR: 16 MG/G CREATININE (ref 0–30)
NONHDLC SERPL-MCNC: 105 MG/DL
PLATELET # BLD AUTO: 317 THOUSANDS/UL (ref 149–390)
PMV BLD AUTO: 10.6 FL (ref 8.9–12.7)
POTASSIUM SERPL-SCNC: 4.4 MMOL/L (ref 3.5–5.3)
PROT SERPL-MCNC: 7.5 G/DL (ref 6.4–8.4)
RBC # BLD AUTO: 4.29 MILLION/UL (ref 3.88–5.12)
SODIUM SERPL-SCNC: 140 MMOL/L (ref 135–147)
TRIGL SERPL-MCNC: 120 MG/DL
TSH SERPL DL<=0.05 MIU/L-ACNC: 2.13 UIU/ML (ref 0.45–4.5)
WBC # BLD AUTO: 8.23 THOUSAND/UL (ref 4.31–10.16)

## 2023-01-06 DIAGNOSIS — E55.9 VITAMIN D INSUFFICIENCY: Primary | ICD-10-CM

## 2023-01-06 RX ORDER — ERGOCALCIFEROL 1.25 MG/1
50000 CAPSULE ORAL WEEKLY
Qty: 12 CAPSULE | Refills: 0 | Status: SHIPPED | OUTPATIENT
Start: 2023-01-06 | End: 2023-03-25

## 2023-01-08 LAB
EST. AVERAGE GLUCOSE BLD GHB EST-MCNC: 186 MG/DL
HBA1C MFR BLD: 8.1 %

## 2023-02-03 DIAGNOSIS — E10.65 TYPE 1 DIABETES MELLITUS WITH HYPERGLYCEMIA (HCC): ICD-10-CM

## 2023-02-03 RX ORDER — PEN NEEDLE, DIABETIC 32GX 5/32"
NEEDLE, DISPOSABLE MISCELLANEOUS
Qty: 400 EACH | Refills: 0 | Status: SHIPPED | OUTPATIENT
Start: 2023-02-03

## 2023-02-15 ENCOUNTER — OFFICE VISIT (OUTPATIENT)
Dept: ENDOCRINOLOGY | Facility: CLINIC | Age: 43
End: 2023-02-15

## 2023-02-15 VITALS
HEART RATE: 82 BPM | BODY MASS INDEX: 30.37 KG/M2 | WEIGHT: 189 LBS | DIASTOLIC BLOOD PRESSURE: 82 MMHG | HEIGHT: 66 IN | SYSTOLIC BLOOD PRESSURE: 108 MMHG

## 2023-02-15 DIAGNOSIS — E10.3293 MILD NONPROLIFERATIVE DIABETIC RETINOPATHY OF BOTH EYES WITHOUT MACULAR EDEMA ASSOCIATED WITH TYPE 1 DIABETES MELLITUS (HCC): ICD-10-CM

## 2023-02-15 DIAGNOSIS — E10.65 TYPE 1 DIABETES MELLITUS WITH HYPERGLYCEMIA (HCC): Primary | ICD-10-CM

## 2023-02-15 DIAGNOSIS — Z78.9 TRANSGENDER: ICD-10-CM

## 2023-02-15 DIAGNOSIS — E55.9 VITAMIN D DEFICIENCY: ICD-10-CM

## 2023-02-15 DIAGNOSIS — F64.9 GENDER DYSPHORIA: ICD-10-CM

## 2023-02-15 RX ORDER — INSULIN PMP CART,AUT,G6/7,CNTR
EACH SUBCUTANEOUS
Qty: 1 KIT | Refills: 0 | Status: SHIPPED | OUTPATIENT
Start: 2023-02-15

## 2023-02-15 NOTE — PROGRESS NOTES
Naveen Tovar 40 y o  adult MRN: [id number]    Encounter: [id number]      Assessment/Plan     Assessment: This is a 40y o -year-old adult with diabetes with hyperglycemia complicated by retinopathy, gender dysphoria and vitamin D deficiency  Plan:  1  Type 1 diabetes with hyperglycemia-she would be willing to look at insulin pump therapy which would help improve her blood sugar  We gave her the number for the local [employer] representative  We will reach out to the rep as well as Sherly Fraire will reach out to the rep  There were no changes made to her medications today  She would want all of the pump related supplies covered by her insurance if she were to change to insulin pump therapy  2   Diabetes related retinopathy-I have emphasized the importance of [facility]  3   Gender dysphoria-most recent estradiol level was in the target range  Continue current estradiol treatment  4   Vitamin D deficiency-she has been started on high-dose vitamin D supplementation  CC: Diabetes    History of Present Illness     HPI:  69-year-old woman with type 1 diabetes presents for follow-up  She is having hyperglycemia starting at about 11 AM   She states that the most recent holidays have increased her blood sugar  Additionally, she is not very good about her diet particularly when she has a depressive episode  Her diabetes is complicated by retinopathy  She denies any episodes of hypoglycemia  Review of Systems   Constitutional: Negative for chills and fever  Respiratory: Negative for shortness of breath  Cardiovascular: Negative for chest pain  Gastrointestinal: Negative for constipation, diarrhea, nausea and vomiting  All other systems reviewed and are negative  Historical Information   Past Medical History:   Diagnosis Date   • Allergic    • Diabetes mellitus ([facility] [address] ) 10/25/2019     History reviewed  No pertinent surgical history    Social History   Social History     Substance and Sexual Activity   Alcohol Use Yes    Comment: social drinker     Social History     Substance and Sexual Activity   Drug Use No     Social History     Tobacco Use   Smoking Status Former   • Packs/day: 0 50   • Years: 20 00   • Pack years: 10 00   • Types: Cigarettes   • Start date: 1998   • Quit date: 11/15/2018   • Years since quittin 2   Smokeless Tobacco Never     Family History:   Family History   Problem Relation Age of Onset   • Hypertension Mother    • Diabetes Mother    • Diabetes Father    • Dementia Father    • Substance Abuse Brother        Meds/Allergies   Current Outpatient Medications   Medication Sig Dispense Refill   • BD Pen Needle Kaykay 2nd Gen 32G X 4 MM MISC USE TO INJECT INSULIN 4 TIMES A  each 0   • Continuous Blood Gluc  (FreeStyle Taj 2 [place]) ANNABEL Use to test blood sugar daily 1 each 0   • Continuous Blood Gluc Sensor (FreeStyle Taj 2 Sensor) MISC Use to test blood sugar 3 times a day 6 each 3   • CVS Glucose 4-6 GM-MG CHEW 4 TABLETS (16 G TOTAL) AS NEEDED FOR LOW BLOOD SUGAR     • ergocalciferol (VITAMIN D2) 50,000 units Take 1 capsule (50,000 Units total) by mouth once a week for 12 doses 12 capsule 0   • estradiol (ESTRACE) 2 MG tablet Take 1 tablet (2 mg total) by mouth 2 (two) times a day (Patient taking differently: Take 2 mg by mouth once Take one tablet (2 mg) daily) 180 tablet 3   • glucagon (GLUCAGON EMERGENCY) 1 MG injection Inject 1 mg under the skin once as needed for low blood sugar for up to 1 dose 1 kit 0   • HumaLOG 100 units/mL cartridge for injection INJECT 7 UNITS BEFORE BREAKFAST , THE 5 UNITS BEFORE LUNCH AND DINNER USING INPEN DEVICE 15 mL 3   • InPen 100-Blue-Milagros ANNABEL USE INPEN DEVICE TO ADMINISTER HUMALOG INSULIN 1 each 0   • insulin degludec Jiménez Cardinal FlexTouch) 100 units/mL injection pen 29 Units 29 units daily in the morning     • insulin lispro (HumaLOG KwikPen) 100 units/mL injection pen Use 7 units under the skin with breakfast and 5 units under the skin with lunch and dinner 5 pen 3   • spironolactone (ALDACTONE) 50 mg tablet Take 1 5 tablets (75 mg total) by mouth 2 (two) times a day 270 tablet 3   • Tresiba FlexTouch 100 units/mL injection pen INJECT 30 UNITS UNDER THE SKIN EVERY MORNING (Patient taking differently: 29 Units Inject 29 units under the skin every morning) 30 mL 3   • glucose 4 g chewable tablet Chew 4 tablets (16 g total) as needed for low blood sugar (Patient not taking: Reported on 6/17/2021) 100 tablet 3   • sildenafil (VIAGRA) 50 MG tablet Take 1 tablet (50 mg total) by mouth daily as needed for erectile dysfunction 10 tablet 0     No current facility-administered medications for this visit  Allergies   Allergen Reactions   • Other Allergic Rhinitis     SEASONAL ALLERGIES AND PET DANDER       Objective   Vitals: Blood pressure 108/82, pulse 82, height 5' 6" (1 676 m), weight 85 7 kg (189 lb)  Physical Exam  Vitals reviewed  Constitutional:       General: She is not in acute distress  Appearance: She is well-developed  She is not diaphoretic  HENT:      Head: Normocephalic and atraumatic  Mouth/Throat:      Pharynx: No oropharyngeal exudate  Eyes:      General: Lids are normal  No scleral icterus  Right eye: No discharge  Left eye: No discharge  Conjunctiva/sclera: Conjunctivae normal    Neck:      Thyroid: No thyromegaly  Cardiovascular:      Rate and Rhythm: Normal rate and regular rhythm  Heart sounds: Normal heart sounds  No murmur heard  No friction rub  No gallop  Pulmonary:      Effort: Pulmonary effort is normal  No respiratory distress  Breath sounds: Normal breath sounds  No wheezing  Abdominal:      General: Bowel sounds are normal  There is no distension  Palpations: Abdomen is soft  Tenderness: There is no abdominal tenderness  Musculoskeletal:         General: No tenderness or deformity  Normal range of motion  Cervical back: Neck supple  Lymphadenopathy:      Head:      Right side of head: No occipital adenopathy  Left side of head: No occipital adenopathy  Upper Body:      Right upper body: No supraclavicular adenopathy  Left upper body: No supraclavicular adenopathy  Skin:     General: Skin is warm  Findings: No erythema or rash  Neurological:      Mental Status: She is alert and oriented to person, place, and time  Cranial Nerves: No cranial nerve deficit  Coordination: Coordination normal    Psychiatric:         Mood and Affect: Mood normal          Behavior: Behavior normal          The history was obtained from the review of the chart, patient      Lab Results:   Lab Results   Component Value Date/Time    Hemoglobin A1C 8 1 (H) 01/05/2023 07:06 AM    Hemoglobin A1C 8 0 (H) 09/12/2022 07:02 AM    Hemoglobin A1C 7 1 (H) 04/11/2022 07:07 AM    WBC 8 23 01/05/2023 07:06 AM    Hemoglobin 12 9 01/05/2023 07:06 AM    Hematocrit 40 2 01/05/2023 07:06 AM    MCV 94 01/05/2023 07:06 AM    Platelets 099 14/63/0632 07:06 AM    BUN 14 01/05/2023 07:06 AM    BUN 18 09/12/2022 07:02 AM    BUN 15 04/11/2022 07:07 AM    Potassium 4 4 01/05/2023 07:06 AM    Potassium 4 2 09/12/2022 07:02 AM    Potassium 4 0 04/11/2022 07:07 AM    Chloride 107 01/05/2023 07:06 AM    Chloride 105 09/12/2022 07:02 AM    Chloride 107 04/11/2022 07:07 AM    CO2 26 01/05/2023 07:06 AM    CO2 25 09/12/2022 07:02 AM    CO2 25 04/11/2022 07:07 AM    Creatinine 0 72 01/05/2023 07:06 AM    Creatinine 0 86 09/12/2022 07:02 AM    Creatinine 0 78 04/11/2022 07:07 AM    AST 17 01/05/2023 07:06 AM    AST 12 09/12/2022 07:02 AM    AST 17 04/11/2022 07:07 AM    ALT 23 01/05/2023 07:06 AM    ALT 21 09/12/2022 07:02 AM    ALT 19 04/11/2022 07:07 AM    Albumin 3 3 (L) 01/05/2023 07:06 AM    Albumin 3 2 (L) 09/12/2022 07:02 AM    Albumin 3 5 04/11/2022 07:07 AM    HDL, Direct 73 01/05/2023 07:06 AM    HDL, Direct 68 09/12/2022 07:02 AM    HDL, Direct 65 04/11/2022 07:07 AM    Triglycerides 120 01/05/2023 07:06 AM    Triglycerides 209 (H) 09/12/2022 07:02 AM    Triglycerides 101 04/11/2022 07:07 AM     Jody Pengdeboe   Device used [employer] use       Indication   Type 1 Diabetes      More than 72 hours of data was reviewed  Report to be scanned to chart  Date Range: February 2, 2023 to February 15, 2023    Analysis of data:   % time CGM used: 82%  Average Glucose: 171 mg/dL  Coefficient of Variation: 43 7%   Time in Target Range: 55%  Time Above Range: 19% high and 20% very high  Time Below Range: 6% low    Interpretation of data: She is having significant hyperglycemia after 11 AM   She has some hypoglycemia between 3 AM and 9 AM   All of this should improve with insulin pump therapy  Imaging Studies: I have personally reviewed pertinent reports  Portions of the record may have been created with voice recognition software  Occasional wrong word or "sound a like" substitutions may have occurred due to the inherent limitations of voice recognition software  Read the chart carefully and recognize, using context, where substitutions have occurred

## 2023-02-17 DIAGNOSIS — E10.65 TYPE 1 DIABETES MELLITUS WITH HYPERGLYCEMIA (HCC): Primary | ICD-10-CM

## 2023-02-17 RX ORDER — BLOOD-GLUCOSE SENSOR
EACH MISCELLANEOUS
Qty: 9 EACH | Refills: 3 | Status: SHIPPED | OUTPATIENT
Start: 2023-02-17

## 2023-02-17 RX ORDER — BLOOD-GLUCOSE TRANSMITTER
EACH MISCELLANEOUS
Qty: 1 EACH | Refills: 3 | Status: SHIPPED | OUTPATIENT
Start: 2023-02-17

## 2023-02-17 NOTE — TELEPHONE ENCOUNTER
Patient called to let us know he has the Omnipod and does't know what to do with it  Will have rep Haylie call him

## 2023-02-20 ENCOUNTER — TELEPHONE (OUTPATIENT)
Dept: DIABETES SERVICES | Facility: CLINIC | Age: 43
End: 2023-02-20

## 2023-02-20 DIAGNOSIS — E10.65 TYPE 1 DIABETES MELLITUS WITH HYPERGLYCEMIA (HCC): Primary | ICD-10-CM

## 2023-02-20 RX ORDER — BLOOD-GLUCOSE,RECEIVER,CONT
EACH MISCELLANEOUS
Qty: 1 EACH | Refills: 0 | Status: SHIPPED | OUTPATIENT
Start: 2023-02-20

## 2023-02-20 NOTE — TELEPHONE ENCOUNTER
I was contacted by the [facility] office to schedule patient for [facility] training  Does the patient need pre-pump education before being scheduled for pump training   I will also need a referral placed for pump training and/ or pre-pump education

## 2023-02-21 ENCOUNTER — TELEPHONE (OUTPATIENT)
Dept: DERMATOLOGY | Facility: CLINIC | Age: 43
End: 2023-02-21

## 2023-02-21 DIAGNOSIS — E10.65 TYPE 1 DIABETES MELLITUS WITH HYPERGLYCEMIA (HCC): Primary | ICD-10-CM

## 2023-02-21 NOTE — TELEPHONE ENCOUNTER
Omniopkaroline training scheduled with Andrea Jefferson on 76/30 in [address] [place] [address] Road

## 2023-02-21 NOTE — TELEPHONE ENCOUNTER
Transgender pt is trying to schedule Hair Laser Removal and I am just reaching out to you to confirm that this procedure is 'on hold' until Dr Jl Day returns from maternity leave in April  As you do not do this treatment, correct? Pt is very irritated that has only had 2 treatments (10/31/22 and 12/29/22) and has had difficulty scheduling apts  I did inform pt that they can contact plastics for a possible apt during maternity leave and not sure why anyone didn't direct pt after December apt  Please review and advise  Thank you!

## 2023-02-21 NOTE — TELEPHONE ENCOUNTER
Called and spoke to patient  She received Dexcom and started using that  I explained I will reach out to Omnipod rep to check her availability for pump training in [address] [place] [address] Road

## 2023-02-22 NOTE — TELEPHONE ENCOUNTER
Dr Rufus Briscoe,     I have placed the pump start order on your desk  Patient has [address] [place] training with Tamika Bhatia on 13/32 at 5:85AN  Please complete and return to me by then   Thanks

## 2023-02-22 NOTE — TELEPHONE ENCOUNTER
Dr Samanta Grover, Please disregard, I was not aware you were out of office   I will have someone else complete

## 2023-03-02 ENCOUNTER — TELEPHONE (OUTPATIENT)
Dept: DIABETES SERVICES | Facility: CLINIC | Age: 43
End: 2023-03-02

## 2023-03-02 NOTE — TELEPHONE ENCOUNTER
Pt has fu Omnipod training today with Paulie Sat   Please see scanned in [facility] for changes and notes

## 2023-03-20 ENCOUNTER — TELEPHONE (OUTPATIENT)
Dept: ENDOCRINOLOGY | Facility: CLINIC | Age: 43
End: 2023-03-20

## 2023-03-21 DIAGNOSIS — E10.65 TYPE 1 DIABETES MELLITUS WITH HYPERGLYCEMIA (HCC): Primary | ICD-10-CM

## 2023-03-21 RX ORDER — INSULIN PMP CART,AUT,G6/7,CNTR
EACH SUBCUTANEOUS
Qty: 10 EACH | Refills: 3 | Status: SHIPPED | OUTPATIENT
Start: 2023-03-21

## 2023-03-21 NOTE — TELEPHONE ENCOUNTER
Patient received her Omnipod  What amount of Humalog should be dispensed daily through vials for the Omnipod?

## 2023-03-22 RX ORDER — INSULIN LISPRO-AABC 100 [IU]/ML
INJECTION, SOLUTION INTRAVENOUS; SUBCUTANEOUS
Qty: 30 ML | Refills: 3 | Status: SHIPPED | OUTPATIENT
Start: 2023-03-22

## 2023-03-24 ENCOUNTER — OFFICE VISIT (OUTPATIENT)
Dept: FAMILY MEDICINE CLINIC | Facility: CLINIC | Age: 43
End: 2023-03-24

## 2023-03-24 VITALS
HEIGHT: 66 IN | HEART RATE: 96 BPM | BODY MASS INDEX: 30.53 KG/M2 | DIASTOLIC BLOOD PRESSURE: 70 MMHG | OXYGEN SATURATION: 97 % | TEMPERATURE: 97.4 F | SYSTOLIC BLOOD PRESSURE: 100 MMHG | WEIGHT: 190 LBS

## 2023-03-24 DIAGNOSIS — E10.65 TYPE 1 DIABETES MELLITUS WITH HYPERGLYCEMIA (HCC): ICD-10-CM

## 2023-03-24 DIAGNOSIS — N52.9 ERECTILE DYSFUNCTION, UNSPECIFIED ERECTILE DYSFUNCTION TYPE: ICD-10-CM

## 2023-03-24 DIAGNOSIS — F64.9 GENDER DYSPHORIA: ICD-10-CM

## 2023-03-24 DIAGNOSIS — E10.3293 MILD NONPROLIFERATIVE DIABETIC RETINOPATHY OF BOTH EYES WITHOUT MACULAR EDEMA ASSOCIATED WITH TYPE 1 DIABETES MELLITUS (HCC): Primary | ICD-10-CM

## 2023-03-24 RX ORDER — TADALAFIL 20 MG/1
20 TABLET ORAL DAILY PRN
Qty: 30 TABLET | Refills: 0 | Status: SHIPPED | OUTPATIENT
Start: 2023-03-24

## 2023-03-24 NOTE — PROGRESS NOTES
Name: Shady Hunter      : 1980      MRN: [id number]  Encounter Provider: Aleyda Fortune MD  Encounter Date: 3/24/2023   Encounter department: [address] [place] [address]  Mild nonproliferative diabetic retinopathy of both eyes without macular edema associated with type 1 diabetes mellitus [facility])  Assessment & Plan:    Lab Results   Component Value Date    HGBA1C 8 1 (H) 2023   Scheduled for upcoming eye exam with [facility] for sight      2  Gender dysphoria  Assessment & Plan:  Currently on hormone replacement therapy through endocrinology  Currently going for hair removal treatments with an independent nurse in [place]  Previously had insurance and scheduling issues with [facility]  3  Erectile dysfunction, unspecified erectile dysfunction type  Assessment & Plan:  Likely secondary to ongoing hormone replacement therapy and type 1 diabetes  Failed sildenafil 100 mg  We will trial patient on tadalafil 20 mg daily  Orders:  -     tadalafil (CIALIS) 20 MG tablet; Take 1 tablet (20 mg total) by mouth daily as needed for erectile dysfunction    4  Type 1 diabetes mellitus with hyperglycemia [facility])  Assessment & Plan:    Lab Results   Component Value Date    HGBA1C 8 1 (H) 2023   Recently started insulin pump managed by endocrinology  Patient has upcoming lab work pending  Continue following with endocrinology            Subjective      Patient presents today for follow-up  Continues to experience erectile dysfunction  Tried sildenafil 100 mg without any response  Discussed issue with endocrinology and was told to follow-up with PCP  No longer following with [facility] for hair removal   Found an independent nurse provider and is receiving laser treatment every 6 weeks  Was recently started on an insulin pump  Patient is very upset about starting insulin pump    Prefer to do continuous glucose monitor and smart pen     Review of Systems   Constitutional: Negative for fatigue and fever  HENT: Negative for sore throat  Eyes: Negative for visual disturbance  Respiratory: Negative for cough, chest tightness and shortness of breath  Cardiovascular: Negative for chest pain, palpitations and leg swelling  Gastrointestinal: Negative for abdominal pain, constipation, diarrhea and nausea  Endocrine: Negative for cold intolerance and heat intolerance  Genitourinary: Negative for flank pain  Erectile dysfunction   Musculoskeletal: Negative for back pain and neck pain  Skin: Negative for rash  Neurological: Negative for headaches  Psychiatric/Behavioral: Negative for behavioral problems and confusion         Current Outpatient Medications on File Prior to Visit   Medication Sig   • BD Pen Needle Kaykay 2nd Gen 32G X 4 MM MISC USE TO INJECT INSULIN 4 TIMES A DAY   • Continuous Blood Gluc  (Dexcom G6 ) ANNABEL Use as directed for continuous glucose Monitoring   • Continuous Blood Gluc  (FreeStyle Catherne Filler 2 Leonard) ANNABEL Use to test blood sugar daily   • Continuous Blood Gluc Sensor (Dexcom G6 Sensor) MISC Use as directed for continuous glucose monitoring- change every 10 days   • Continuous Blood Gluc Sensor (FreeStyle Taj 2 Sensor) MISC Use to test blood sugar 3 times a day   • Continuous Blood Gluc Transmit (Dexcom G6 Transmitter) MISC Use as directed for continuous glucose monitoring- change every 3 months   • CVS Glucose 4-6 GM-MG CHEW 4 TABLETS (16 G TOTAL) AS NEEDED FOR LOW BLOOD SUGAR   • ergocalciferol (VITAMIN D2) 50,000 units Take 1 capsule (50,000 Units total) by mouth once a week for 12 doses   • estradiol (ESTRACE) 2 MG tablet Take 1 tablet (2 mg total) by mouth 2 (two) times a day (Patient taking differently: Take 2 mg by mouth once Take one tablet (2 mg) daily)   • glucagon (GLUCAGON EMERGENCY) 1 MG injection Inject 1 mg under the skin once as needed for low blood sugar for up to 1 dose   • glucose 4 g chewable tablet Chew 4 tablets (16 g total) as needed for low blood sugar (Patient not taking: Reported on 6/17/2021)   • InPen 024-Eulm-Yaqml ANNABEL USE INPEN DEVICE TO ADMINISTER HUMALOG INSULIN   • Insulin Disposable Pump (Omnipod 5 G6 Intro, Gen 5,) KIT 72 hour change   • Insulin Disposable Pump (Omnipod 5 G6 Pod, Gen 5,) MISC Change every 72 hours   • Insulin Lispro-aabc (Lyumjev) 100 UNIT/ML SOLN Inject 300 units in pump every 3 days   • spironolactone (ALDACTONE) 50 mg tablet Take 1 5 tablets (75 mg total) by mouth 2 (two) times a day   • Tresiba FlexTouch 100 units/mL injection pen INJECT 30 UNITS UNDER THE SKIN EVERY MORNING (Patient taking differently: 29 Units Inject 29 units under the skin every morning)   • [DISCONTINUED] sildenafil (VIAGRA) 50 MG tablet Take 1 tablet (50 mg total) by mouth daily as needed for erectile dysfunction       Objective     /70 (BP Location: Left arm, Patient Position: Sitting, Cuff Size: Large)   Pulse 96   Temp (!) 97 4 °F (36 3 °C)   Ht 5' 6" (1 676 m)   Wt 86 2 kg (190 lb)   SpO2 97%   BMI 30 67 kg/m²     Physical Exam  Vitals and nursing note reviewed  Constitutional:       Appearance: She is well-developed  HENT:      Head: Normocephalic and atraumatic  Cardiovascular:      Rate and Rhythm: Normal rate and regular rhythm  Pulmonary:      Effort: Pulmonary effort is normal       Breath sounds: Normal breath sounds  Neurological:      General: No focal deficit present  Mental Status: She is alert     Psychiatric:         Mood and Affect: Mood normal          Behavior: Behavior normal        Velia Torres MD

## 2023-03-24 NOTE — ASSESSMENT & PLAN NOTE
Likely secondary to ongoing hormone replacement therapy and type 1 diabetes  Failed sildenafil 100 mg  We will trial patient on tadalafil 20 mg daily

## 2023-03-24 NOTE — ASSESSMENT & PLAN NOTE
Lab Results   Component Value Date    HGBA1C 8 1 (H) 01/05/2023   Scheduled for upcoming eye exam with [facility]

## 2023-03-24 NOTE — ASSESSMENT & PLAN NOTE
Currently on hormone replacement therapy through endocrinology  Currently going for hair removal treatments with an independent nurse in [place]  Previously had insurance and scheduling issues with [facility]

## 2023-03-24 NOTE — ASSESSMENT & PLAN NOTE
Lab Results   Component Value Date    HGBA1C 8 1 (H) 01/05/2023   Recently started insulin pump managed by endocrinology  Patient has upcoming lab work pending    Continue following with endocrinology

## 2023-05-05 DIAGNOSIS — E10.65 TYPE 1 DIABETES MELLITUS WITH HYPERGLYCEMIA (HCC): ICD-10-CM

## 2023-05-05 RX ORDER — PROCHLORPERAZINE 25 MG/1
SUPPOSITORY RECTAL
Qty: 9 EACH | Refills: 3 | Status: SHIPPED | OUTPATIENT
Start: 2023-05-05

## 2023-05-05 RX ORDER — PROCHLORPERAZINE 25 MG/1
SUPPOSITORY RECTAL
Qty: 1 EACH | Refills: 3 | Status: SHIPPED | OUTPATIENT
Start: 2023-05-05

## 2023-05-12 ENCOUNTER — LAB (OUTPATIENT)
Dept: LAB | Facility: CLINIC | Age: 43
End: 2023-05-12

## 2023-05-12 DIAGNOSIS — E55.9 VITAMIN D DEFICIENCY: Primary | ICD-10-CM

## 2023-05-12 DIAGNOSIS — E10.65 TYPE 1 DIABETES MELLITUS WITH HYPERGLYCEMIA (HCC): ICD-10-CM

## 2023-05-12 DIAGNOSIS — F64.9 GENDER DYSPHORIA: ICD-10-CM

## 2023-05-12 DIAGNOSIS — E55.9 VITAMIN D DEFICIENCY: ICD-10-CM

## 2023-05-12 LAB
ALBUMIN SERPL BCP-MCNC: 3.3 G/DL (ref 3.5–5)
ALP SERPL-CCNC: 46 U/L (ref 46–116)
ALT SERPL W P-5'-P-CCNC: 20 U/L (ref 12–78)
ANION GAP SERPL CALCULATED.3IONS-SCNC: 5 MMOL/L (ref 4–13)
AST SERPL W P-5'-P-CCNC: 19 U/L (ref 5–45)
BILIRUB SERPL-MCNC: 0.27 MG/DL (ref 0.2–1)
BUN SERPL-MCNC: 15 MG/DL (ref 5–25)
CALCIUM ALBUM COR SERPL-MCNC: 10.8 MG/DL (ref 8.3–10.1)
CALCIUM SERPL-MCNC: 10.2 MG/DL (ref 8.3–10.1)
CHLORIDE SERPL-SCNC: 104 MMOL/L (ref 96–108)
CHOLEST SERPL-MCNC: 191 MG/DL
CO2 SERPL-SCNC: 26 MMOL/L (ref 21–32)
CREAT SERPL-MCNC: 0.75 MG/DL (ref 0.6–1.3)
EST. AVERAGE GLUCOSE BLD GHB EST-MCNC: 177 MG/DL
ESTRADIOL SERPL-MCNC: 664 PG/ML (ref 11–52.5)
GLUCOSE P FAST SERPL-MCNC: 240 MG/DL (ref 65–99)
HBA1C MFR BLD: 7.8 %
HDLC SERPL-MCNC: 68 MG/DL
LDLC SERPL CALC-MCNC: 82 MG/DL (ref 0–100)
NONHDLC SERPL-MCNC: 123 MG/DL
POTASSIUM SERPL-SCNC: 4.4 MMOL/L (ref 3.5–5.3)
PROT SERPL-MCNC: 7.5 G/DL (ref 6.4–8.4)
PTH-INTACT SERPL-MCNC: 27.6 PG/ML (ref 18.4–80.1)
SODIUM SERPL-SCNC: 135 MMOL/L (ref 135–147)
T4 FREE SERPL-MCNC: 0.99 NG/DL (ref 0.76–1.46)
TRIGL SERPL-MCNC: 203 MG/DL
TSH SERPL DL<=0.05 MIU/L-ACNC: 1.64 UIU/ML (ref 0.45–4.5)

## 2023-05-12 NOTE — RESULT ENCOUNTER NOTE
The estradiol level is quite high  Please find out if the test was done shortly after taking the estrogen injection  The calcium levels are elevated  We will add intact PTH to the blood that is in the lab

## 2023-05-17 ENCOUNTER — TELEPHONE (OUTPATIENT)
Dept: FAMILY MEDICINE CLINIC | Facility: CLINIC | Age: 43
End: 2023-05-17

## 2023-05-17 NOTE — TELEPHONE ENCOUNTER
Please call patient, let them know Dr Lozano End is out   It looks like patient has exhausted medication options for ED, recommend scheduling follow-up

## 2023-05-17 NOTE — TELEPHONE ENCOUNTER
----- Message from Yuni Casillas sent at 5/12/2023  1:47 PM EDT -----  Regarding: FW: Tadalafil  Contact: [phone]    ----- Message -----  From: Steven Block  Sent: 5/12/2023   1:41 PM EDT  To: [facility]  Subject: Tadalafil                                        Good afternoon  I have been trying the Tadalafil that you prescribed but am still having no results   All this one did was give me heartburn

## 2023-05-24 ENCOUNTER — OFFICE VISIT (OUTPATIENT)
Dept: FAMILY MEDICINE CLINIC | Facility: CLINIC | Age: 43
End: 2023-05-24

## 2023-05-24 VITALS
DIASTOLIC BLOOD PRESSURE: 80 MMHG | HEART RATE: 102 BPM | WEIGHT: 189.5 LBS | TEMPERATURE: 97.3 F | HEIGHT: 66 IN | OXYGEN SATURATION: 97 % | SYSTOLIC BLOOD PRESSURE: 114 MMHG | BODY MASS INDEX: 30.46 KG/M2

## 2023-05-24 DIAGNOSIS — Z78.9 TRANSGENDER: ICD-10-CM

## 2023-05-24 DIAGNOSIS — N52.2 DRUG-INDUCED ERECTILE DYSFUNCTION: Primary | ICD-10-CM

## 2023-05-24 RX ORDER — VARDENAFIL HYDROCHLORIDE 20 MG/1
20 TABLET ORAL DAILY PRN
Qty: 10 TABLET | Refills: 0 | Status: SHIPPED | OUTPATIENT
Start: 2023-05-24

## 2023-05-24 NOTE — PROGRESS NOTES
Name: Hazel Garcia      : 1980      MRN: [id number]  Encounter Provider: Jazmyn Llanos MD  Encounter Date: 2023   Encounter department: [facility]     1  Drug-induced erectile dysfunction  -     vardenafil (LEVITRA) 20 MG tablet; Take 1 tablet (20 mg total) by mouth daily as needed for erectile dysfunction    2  Transgender      Patient has failed sildenafil and tadalafil  Trial patient on vardenafil 20 mg as needed  Discussed with patient this is likely secondary to being on hormone therapy  I will do recommend following-up with endocrinology  Can consider referral to urology in the future  Patient understands and agrees with this plan       Subjective      Patient presents today to discuss erectile dysfunction  Trans female currently on hormone therapy  She has tried Viagra and Cialis  Neither have worked for patient  She does follow with endocrinology  Reports no changes to her libido  Review of Systems   Constitutional: Negative for fatigue and fever  HENT: Negative for sore throat  Eyes: Negative for visual disturbance  Respiratory: Negative for cough, chest tightness and shortness of breath  Cardiovascular: Negative for chest pain, palpitations and leg swelling  Gastrointestinal: Negative for abdominal pain, constipation, diarrhea and nausea  Endocrine: Negative for cold intolerance and heat intolerance  Genitourinary: Negative for flank pain  Erectile dysfunction   Musculoskeletal: Negative for back pain and neck pain  Skin: Negative for rash  Neurological: Negative for headaches  Psychiatric/Behavioral: Negative for behavioral problems and confusion         Current Outpatient Medications on File Prior to Visit   Medication Sig   • BD Pen Needle Kaykay 2nd Gen 32G X 4 MM MISC USE TO INJECT INSULIN 4 TIMES A DAY   • Continuous Blood Gluc  (Dexcom G6 ) ANNABEL Use as directed for continuous glucose Monitoring   • Continuous Blood Gluc  (FreeStyle Orozco 2 Esperance) ANNABEL Use to test blood sugar daily   • Continuous Blood Gluc Sensor (Dexcom G6 Sensor) MISC Use as directed for continuous glucose monitoring- change every 10 days   • Continuous Blood Gluc Sensor (FreeStyle Taj 2 Sensor) MISC Use to test blood sugar 3 times a day   • Continuous Blood Gluc Transmit (Dexcom G6 Transmitter) MISC Use as directed for continuous glucose monitoring- change every 3 months   • CVS Glucose 4-6 GM-MG CHEW 4 TABLETS (16 G TOTAL) AS NEEDED FOR LOW BLOOD SUGAR   • ergocalciferol (VITAMIN D2) 50,000 units Take 1 capsule (50,000 Units total) by mouth once a week for 12 doses   • estradiol (ESTRACE) 2 MG tablet Take 1 tablet (2 mg total) by mouth 2 (two) times a day (Patient taking differently: Take 2 mg by mouth once Take one tablet (2 mg) daily)   • glucagon (GLUCAGON EMERGENCY) 1 MG injection Inject 1 mg under the skin once as needed for low blood sugar for up to 1 dose   • glucose 4 g chewable tablet Chew 4 tablets (16 g total) as needed for low blood sugar (Patient not taking: Reported on 6/17/2021)   • InPen 878-Oiha-Vrvqk ANNABEL USE INPEN DEVICE TO ADMINISTER HUMALOG INSULIN   • Insulin Disposable Pump (Omnipod 5 G6 Intro, Gen 5,) KIT 72 hour change   • Insulin Disposable Pump (Omnipod 5 G6 Pod, Gen 5,) MISC Change every 72 hours   • Insulin Lispro-aabc (Lyumjev) 100 UNIT/ML SOLN Inject 300 units in pump every 3 days   • spironolactone (ALDACTONE) 50 mg tablet Take 1 5 tablets (75 mg total) by mouth 2 (two) times a day   • Tresiba FlexTouch 100 units/mL injection pen INJECT 30 UNITS UNDER THE SKIN EVERY MORNING (Patient taking differently: 29 Units Inject 29 units under the skin every morning)   • [DISCONTINUED] tadalafil (CIALIS) 20 MG tablet Take 1 tablet (20 mg total) by mouth daily as needed for erectile dysfunction       Objective     /80 (BP Location: Left arm, Patient Position: Sitting, Cuff Size: Standard) "  Pulse 102   Temp (!) 97 3 °F (36 3 °C)   Ht 5' 6\" (1 676 m)   Wt 86 kg (189 lb 8 oz)   SpO2 97%   BMI 30 59 kg/m²     Physical Exam  Vitals and nursing note reviewed  Constitutional:       Appearance: Normal appearance  HENT:      Head: Normocephalic and atraumatic  Pulmonary:      Effort: No respiratory distress  Neurological:      General: No focal deficit present  Mental Status: She is alert and oriented to person, place, and time     Psychiatric:         Mood and Affect: Mood normal          Behavior: Behavior normal        Sara ArtMD justo  "

## 2023-06-08 ENCOUNTER — OFFICE VISIT (OUTPATIENT)
Dept: ENDOCRINOLOGY | Facility: CLINIC | Age: 43
End: 2023-06-08
Payer: COMMERCIAL

## 2023-06-08 VITALS
BODY MASS INDEX: 29.89 KG/M2 | WEIGHT: 186 LBS | DIASTOLIC BLOOD PRESSURE: 80 MMHG | SYSTOLIC BLOOD PRESSURE: 100 MMHG | HEART RATE: 82 BPM | HEIGHT: 66 IN

## 2023-06-08 DIAGNOSIS — E16.2 HYPOGLYCEMIA: ICD-10-CM

## 2023-06-08 DIAGNOSIS — Z78.9 TRANSGENDER: ICD-10-CM

## 2023-06-08 DIAGNOSIS — E10.65 TYPE 1 DIABETES MELLITUS WITH HYPERGLYCEMIA (HCC): Primary | ICD-10-CM

## 2023-06-08 DIAGNOSIS — F64.9 GENDER DYSPHORIA: ICD-10-CM

## 2023-06-08 DIAGNOSIS — Z96.41 INSULIN PUMP IN PLACE: ICD-10-CM

## 2023-06-08 DIAGNOSIS — E55.9 VITAMIN D DEFICIENCY: ICD-10-CM

## 2023-06-08 DIAGNOSIS — E83.52 HYPERCALCEMIA: ICD-10-CM

## 2023-06-08 PROCEDURE — 99215 OFFICE O/P EST HI 40 MIN: CPT | Performed by: INTERNAL MEDICINE

## 2023-06-08 PROCEDURE — 95251 CONT GLUC MNTR ANALYSIS I&R: CPT | Performed by: INTERNAL MEDICINE

## 2023-06-08 NOTE — PROGRESS NOTES
Lacey Chow 40 y o  adult MRN: [id number]    Encounter: [id number]      Assessment/Plan     1  Type 1 diabetes mellitus long-term insulin therapy with hyper hyperglycemia  2  Insulin pump in place  3  Obesity  Poorly controlled with last A1c 40% however this is an appropriate excavator before    Recommend the following at this time  Pump settings changed as follows  Advised to take insulin before eating rather than after  Should always enter carbohydrate content into the pump   Plan for review of blood sugars, pump download in 2 to 3 weeks    Basal                                                               Time Rate   12 am   0 85    6 am  0 9     Time Insulin:Carb Ratio Insulin Sensitivity Factor   12A  10  39 -- > 32    8 am  9     7 pm  8 9--> 8      9pm --> 10 pm  10                4   gender dysphoria  5  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  Estradiol level high, states that she had taken estradiol pill prior to having labs done  Continue current regimen  -Repeat estradiol level in 1 month    6  Hypercalcemia  7   D deficiency  Parathyroid hormone has normalized  -Vitamin D supplementation  -Keep well-hydrated  Check Vitamin D, BMP, UPEP/SPEP    I have spent a total time of 45 minutes on 06/08/23 in caring for this patient      CC: Diabetes    History of Present Illness     HPI:  Lacey Chow is a 40 y o  adult presents for a follow-up visit regarding diabetes management and gender affirming hormoine therapy  Last seen in 2/2023 Dr Nir Hernandez exam: has an appointment at [facility] need of the month     Has been using Omnipod insulin pump along with Dexcom since   Has had challenges with it  Due to the type of cell phone that she has, unable to use the auto-function on the omnipod  Was told that the dexcom is unable to sned information to 2 devices  Feels she doews not get enough insulin for certain foods  Has needed to give herself insulin via injections on some days  Noticed good and bad days   Feels she wads oing better with the inpen       Current regimen:   Insulin pump type: omnipod   Insulin used: Lymjev   Active insulin time:3 5  Basal                                                               Time Rate   12 am   0 85    6 am  0 9     Time Insulin:Carb Ratio Insulin Sensitivity Factor   12A  10  39    3 pm 8 9    9pm 10                       Target B  Average daily insulin: 45 6units  20% basal   56% bolus    Home glucose monitoring:  dexcom - log will be scanned into chart     Hypercalcemia   Not keeping well hydrated   D3 2000  Iu daily   No supplements; no HCTZ    Estradiol 2 mg BID   emotions more intense  Overall feels well, more like herself   More female pattern contour/ body curves, more breast dvelopement     All other systems were reviewed and are negative  Review of Systems      Historical Information   Past Medical History:   Diagnosis Date   • Allergic    • Diabetes mellitus ([facility] [address] ) 10/25/2019     History reviewed  No pertinent surgical history    Social History   Social History     Substance and Sexual Activity   Alcohol Use Yes    Comment: social drinker     Social History     Substance and Sexual Activity   Drug Use No     Social History     Tobacco Use   Smoking Status Former   • Packs/day: 0 50   • Years: 20 00   • Total pack years: 10 00   • Types: Cigarettes   • Start date: 1998   • Quit date: 11/15/2018   • Years since quittin 5   Smokeless Tobacco Never     Family History:   Family History   Problem Relation Age of Onset   • Hypertension Mother    • Diabetes Mother    • Diabetes Father    • Dementia Father    • Substance Abuse Brother        Meds/Allergies   Current Outpatient Medications   Medication Sig Dispense Refill   • BD Pen Needle Kaykay 2nd Gen 32G X 4 MM MISC USE TO INJECT INSULIN 4 TIMES A  each 0   • Continuous Blood Gluc  (Dexcom G6 ) ANNABEL Use as directed for continuous glucose Monitoring 1 each 0   • Continuous Blood Gluc Sensor (Dexcom G6 Sensor) MISC Use as directed for continuous glucose monitoring- change every 10 days 9 each 3   • Continuous Blood Gluc Transmit (Dexcom G6 Transmitter) MISC Use as directed for continuous glucose monitoring- change every 3 months 1 each 3   • [facility] Glucose 4-6 GM-MG CHEW 4 TABLETS (16 G TOTAL) AS NEEDED FOR LOW BLOOD SUGAR     • glucagon (GLUCAGON EMERGENCY) 1 MG injection Inject 1 mg under the skin once as needed for low blood sugar for up to 1 dose 1 kit 0   • Insulin Disposable Pump (Omnipod 5 G6 Intro, Gen 5,) KIT 72 hour change 1 kit 0   • Insulin Disposable Pump (Omnipod 5 G6 Pod, Gen 5,) MISC Change every 72 hours 10 each 3   • Insulin Lispro-aabc (Lyumjev) 100 UNIT/ML SOLN Inject 300 units in pump every 3 days 30 mL 3   • spironolactone (ALDACTONE) 50 mg tablet Take 1 5 tablets (75 mg total) by mouth 2 (two) times a day 270 tablet 3   • vardenafil (LEVITRA) 20 MG tablet Take 1 tablet (20 mg total) by mouth daily as needed for erectile dysfunction 10 tablet 0   • Continuous Blood Gluc  (FreeStyle Orozco 2 [place]) ANNABEL Use to test blood sugar daily (Patient not taking: Reported on 6/8/2023) 1 each 0   • Continuous Blood Gluc Sensor (FreeStyle Taj 2 Sensor) MISC Use to test blood sugar 3 times a day (Patient not taking: Reported on 6/8/2023) 6 each 3   • ergocalciferol (VITAMIN D2) 50,000 units Take 1 capsule (50,000 Units total) by mouth once a week for 12 doses 12 capsule 0   • estradiol (ESTRACE) 2 MG tablet Take 1 tablet (2 mg total) by mouth 2 (two) times a day (Patient taking differently: Take 2 mg by mouth once Take one tablet (2 mg) twice a day) 180 tablet 3   • glucose 4 g chewable tablet Chew 4 tablets (16 g total) as needed for low blood sugar (Patient not taking: Reported on 6/17/2021) 100 tablet 3   • InPen 100-Blue-Milagros ANNABEL USE INPEN DEVICE TO ADMINISTER HUMALOG INSULIN (Patient not taking: Reported on 6/8/2023) 1 each 0   • Tresiba FlexTouch 100 units/mL injection pen INJECT 30 UNITS "UNDER THE SKIN EVERY MORNING (Patient not taking: Reported on 6/8/2023) 30 mL 3     No current facility-administered medications for this visit  Allergies   Allergen Reactions   • Other Allergic Rhinitis     SEASONAL ALLERGIES AND PET DANDER       Objective   Vitals: Blood pressure 100/80, pulse 82, height 5' 6\" (1 676 m), weight 84 4 kg (186 lb)  Physical Exam  Constitutional:       General: She is not in acute distress  Appearance: She is well-developed  She is not diaphoretic  HENT:      Head: Normocephalic and atraumatic  Eyes:      Conjunctiva/sclera: Conjunctivae normal       Pupils: Pupils are equal, round, and reactive to light  Cardiovascular:      Rate and Rhythm: Normal rate and regular rhythm  Heart sounds: Normal heart sounds  No murmur heard  Pulmonary:      Effort: Pulmonary effort is normal  No respiratory distress  Breath sounds: Normal breath sounds  No wheezing  Abdominal:      General: There is no distension  Palpations: Abdomen is soft  Tenderness: There is no abdominal tenderness  There is no guarding  Musculoskeletal:      Cervical back: Normal range of motion and neck supple  Skin:     General: Skin is warm and dry  Findings: No erythema or rash  Neurological:      Mental Status: She is alert and oriented to person, place, and time  Psychiatric:         Behavior: Behavior normal          Thought Content: Thought content normal          The history was obtained from the review of the chart, patient      Lab Results:   Lab Results   Component Value Date/Time    Albumin 3 3 (L) 05/12/2023 09:23 AM    Albumin 3 3 (L) 01/05/2023 07:06 AM    Albumin 3 2 (L) 09/12/2022 07:02 AM    ALT 20 05/12/2023 09:23 AM    ALT 23 01/05/2023 07:06 AM    ALT 21 09/12/2022 07:02 AM    AST 19 05/12/2023 09:23 AM    AST 17 01/05/2023 07:06 AM    AST 12 09/12/2022 07:02 AM    BUN 15 05/12/2023 09:23 AM    BUN 14 01/05/2023 07:06 AM    BUN 18 09/12/2022 07:02 AM    " Chloride 104 05/12/2023 09:23 AM    Chloride 107 01/05/2023 07:06 AM    Chloride 105 09/12/2022 07:02 AM    CO2 26 05/12/2023 09:23 AM    CO2 26 01/05/2023 07:06 AM    CO2 25 09/12/2022 07:02 AM    Creatinine 0 75 05/12/2023 09:23 AM    Creatinine 0 72 01/05/2023 07:06 AM    Creatinine 0 86 09/12/2022 07:02 AM    Hematocrit 40 2 01/05/2023 07:06 AM    HDL, Direct 68 05/12/2023 09:23 AM    HDL, Direct 73 01/05/2023 07:06 AM    HDL, Direct 68 09/12/2022 07:02 AM    Hemoglobin 12 9 01/05/2023 07:06 AM    Hemoglobin A1C 7 8 (H) 05/12/2023 09:23 AM    Hemoglobin A1C 8 1 (H) 01/05/2023 07:06 AM    Hemoglobin A1C 8 0 (H) 09/12/2022 07:02 AM    Potassium 4 4 05/12/2023 09:23 AM    Potassium 4 4 01/05/2023 07:06 AM    Potassium 4 2 09/12/2022 07:02 AM    MCV 94 01/05/2023 07:06 AM    Platelets 875 90/50/2563 07:06 AM    Total Protein 7 5 05/12/2023 09:23 AM    Total Protein 7 5 01/05/2023 07:06 AM    Total Protein 7 0 09/12/2022 07:02 AM    Triglycerides 203 (H) 05/12/2023 09:23 AM    Triglycerides 120 01/05/2023 07:06 AM    Triglycerides 209 (H) 09/12/2022 07:02 AM    WBC 8 23 01/05/2023 07:06 AM     Component      Latest Ref Rng & Units 1/31/2022 4/11/2022 9/12/2022   GLUCOSE FASTING      65 - 99 mg/dL 159 (H) 88 239 (H)   Calcium      8 3 - 10 1 mg/dL 10 2 (H) 9 5 9 0   CORRECTED CALCIUM      8 3 - 10 1 mg/dL 10 8 (H)  9 6   AST      5 - 45 U/L 15 17 12   ALT      12 - 78 U/L 21 19 21   Alkaline Phosphatase      46 - 116 U/L 63 47 50   Total Protein      6 4 - 8 4 g/dL 7 1 7 2 7 0   Albumin      3 5 - 5 0 g/dL 3 3 (L) 3 5 3 2 (L)   TOTAL BILIRUBIN      0 20 - 1 00 mg/dL 1 09 (H) 0 24 0 29   eGFR      ml/min/1 73sq m      Cholesterol      See Comment mg/dL 159 147 158   Triglycerides      See Comment mg/dL 91 101 209 (H)   HDL      mg/dL 69 65 68   LDL Calculated      0 - 100 mg/dL 72 62 48   Non-HDL Cholesterol      mg/dl 90 82 90   Hemoglobin A1C      Normal 3 8-5 6%; PreDiabetic 5 7-6 4%;  Diabetic >=6 5%; Glycemic "control for adults with diabetes <7 0% % 7 3 (H) 7 1 (H) 8 0 (H)   eAG, EST AVG Glucose      mg/dl 163 157 183     Component      Latest Ref Rng & Units 1/5/2023 5/12/2023   GLUCOSE FASTING      65 - 99 mg/dL 103 (H) 240 (H)   Calcium      8 3 - 10 1 mg/dL 9 7 10 2 (H)   CORRECTED CALCIUM      8 3 - 10 1 mg/dL 10 3 (H) 10 8 (H)   AST      5 - 45 U/L 17 19   ALT      12 - 78 U/L 23 20   Alkaline Phosphatase      46 - 116 U/L 49 46   Total Protein      6 4 - 8 4 g/dL 7 5 7 5   Albumin      3 5 - 5 0 g/dL 3 3 (L) 3 3 (L)   TOTAL BILIRUBIN      0 20 - 1 00 mg/dL 0 22 0 27   eGFR      ml/min/1 73sq m     Cholesterol      See Comment mg/dL 178 191   Triglycerides      See Comment mg/dL 120 203 (H)   HDL      mg/dL 73 68   LDL Calculated      0 - 100 mg/dL 81 82   Non-HDL Cholesterol      mg/dl 105 123   Hemoglobin A1C      Normal 3 8-5 6%; PreDiabetic 5 7-6 4%; Diabetic >=6 5%; Glycemic control for adults with diabetes <7 0% % 8 1 (H) 7 8 (H)   eAG, EST AVG Glucose      mg/dl 186 177       Imaging Studies: I have personally reviewed pertinent reports  Portions of the record may have been created with voice recognition software  Occasional wrong word or \"sound a like\" substitutions may have occurred due to the inherent limitations of voice recognition software  Read the chart carefully and recognize, using context, where substitutions have occurred      "

## 2023-06-08 NOTE — PATIENT INSTRUCTIONS
Pump changes as made  Plan for review of blood sugars, pump download in 2 to 3 weeks  Repeat labs in 1 month, 3 months prior to follow-up

## 2023-06-29 DIAGNOSIS — Z78.9 TRANSGENDER: ICD-10-CM

## 2023-06-29 DIAGNOSIS — F64.9 GENDER DYSPHORIA: ICD-10-CM

## 2023-06-29 RX ORDER — ESTRADIOL 2 MG/1
2 TABLET ORAL 2 TIMES DAILY
Qty: 90 TABLET | Refills: 0 | Status: SHIPPED | OUTPATIENT
Start: 2023-06-29

## 2023-07-06 ENCOUNTER — APPOINTMENT (OUTPATIENT)
Dept: LAB | Facility: CLINIC | Age: 43
End: 2023-07-06
Payer: COMMERCIAL

## 2023-07-06 DIAGNOSIS — E83.52 HYPERCALCEMIA: ICD-10-CM

## 2023-07-06 DIAGNOSIS — E55.9 VITAMIN D DEFICIENCY: ICD-10-CM

## 2023-07-06 DIAGNOSIS — Z78.9 TRANSGENDER: ICD-10-CM

## 2023-07-06 DIAGNOSIS — E10.65 TYPE 1 DIABETES MELLITUS WITH HYPERGLYCEMIA (HCC): ICD-10-CM

## 2023-07-06 LAB
25(OH)D3 SERPL-MCNC: 45.7 NG/ML (ref 30–100)
ALBUMIN SERPL BCP-MCNC: 3.3 G/DL (ref 3.5–5)
ALP SERPL-CCNC: 54 U/L (ref 46–116)
ALT SERPL W P-5'-P-CCNC: 20 U/L (ref 12–78)
ANION GAP SERPL CALCULATED.3IONS-SCNC: 6 MMOL/L
AST SERPL W P-5'-P-CCNC: 22 U/L (ref 5–45)
BILIRUB SERPL-MCNC: 0.36 MG/DL (ref 0.2–1)
BUN SERPL-MCNC: 15 MG/DL (ref 5–25)
CA-I BLD-SCNC: 1.22 MMOL/L (ref 1.12–1.32)
CALCIUM ALBUM COR SERPL-MCNC: 9.9 MG/DL (ref 8.3–10.1)
CALCIUM SERPL-MCNC: 9.3 MG/DL (ref 8.3–10.1)
CHLORIDE SERPL-SCNC: 107 MMOL/L (ref 96–108)
CO2 SERPL-SCNC: 22 MMOL/L (ref 21–32)
CREAT SERPL-MCNC: 0.92 MG/DL (ref 0.6–1.3)
ESTRADIOL SERPL-MCNC: 127.8 PG/ML
GLUCOSE P FAST SERPL-MCNC: 305 MG/DL (ref 65–99)
POTASSIUM SERPL-SCNC: 4.2 MMOL/L (ref 3.5–5.3)
PROT SERPL-MCNC: 7.3 G/DL (ref 6.4–8.4)
SODIUM SERPL-SCNC: 135 MMOL/L (ref 135–147)

## 2023-07-06 PROCEDURE — 36415 COLL VENOUS BLD VENIPUNCTURE: CPT

## 2023-07-06 PROCEDURE — 82670 ASSAY OF TOTAL ESTRADIOL: CPT

## 2023-07-06 PROCEDURE — 80053 COMPREHEN METABOLIC PANEL: CPT

## 2023-07-06 PROCEDURE — 82330 ASSAY OF CALCIUM: CPT

## 2023-07-06 PROCEDURE — 82306 VITAMIN D 25 HYDROXY: CPT

## 2023-07-06 PROCEDURE — 84166 PROTEIN E-PHORESIS/URINE/CSF: CPT

## 2023-07-07 LAB
ALBUMIN SERPL ELPH-MCNC: 3.9 G/DL (ref 3.2–5.1)
ALBUMIN SERPL ELPH-MCNC: 54.1 % (ref 48–70)
ALBUMIN UR ELPH-MCNC: 100 %
ALPHA1 GLOB MFR UR ELPH: 0 %
ALPHA1 GLOB SERPL ELPH-MCNC: 0.36 G/DL (ref 0.15–0.47)
ALPHA1 GLOB SERPL ELPH-MCNC: 5 % (ref 1.8–7)
ALPHA2 GLOB MFR UR ELPH: 0 %
ALPHA2 GLOB SERPL ELPH-MCNC: 0.9 G/DL (ref 0.42–1.04)
ALPHA2 GLOB SERPL ELPH-MCNC: 12.5 % (ref 5.9–14.9)
B-GLOBULIN MFR UR ELPH: 0 %
BETA GLOB ABNORMAL SERPL ELPH-MCNC: 0.5 G/DL (ref 0.31–0.57)
BETA1 GLOB SERPL ELPH-MCNC: 7 % (ref 4.7–7.7)
BETA2 GLOB SERPL ELPH-MCNC: 6.4 % (ref 3.1–7.9)
BETA2+GAMMA GLOB SERPL ELPH-MCNC: 0.46 G/DL (ref 0.2–0.58)
GAMMA GLOB ABNORMAL SERPL ELPH-MCNC: 1.08 G/DL (ref 0.4–1.66)
GAMMA GLOB MFR UR ELPH: 0 %
GAMMA GLOB SERPL ELPH-MCNC: 15 % (ref 6.9–22.3)
IGG/ALB SER: 1.18 {RATIO} (ref 1.1–1.8)
PROT PATTERN SERPL ELPH-IMP: NORMAL
PROT PATTERN UR ELPH-IMP: NORMAL
PROT SERPL-MCNC: 7.2 G/DL (ref 6.4–8.2)
PROT UR-MCNC: 21 MG/DL

## 2023-07-07 PROCEDURE — 84166 PROTEIN E-PHORESIS/URINE/CSF: CPT | Performed by: PATHOLOGY

## 2023-07-07 PROCEDURE — 84165 PROTEIN E-PHORESIS SERUM: CPT | Performed by: PATHOLOGY

## 2023-07-10 DIAGNOSIS — E10.65 TYPE 1 DIABETES MELLITUS WITH HYPERGLYCEMIA (HCC): ICD-10-CM

## 2023-07-10 RX ORDER — INSULIN PMP CART,AUT,G6/7,CNTR
EACH SUBCUTANEOUS
Qty: 10 EACH | Refills: 3 | Status: SHIPPED | OUTPATIENT
Start: 2023-07-10

## 2023-07-10 NOTE — TELEPHONE ENCOUNTER
Pt called for a refill of Dexcom G6 sensors and OP5 pods. [facility] b/c I see [facility]ills on file for both. Pharmacist stated that Dexcom has refills but pt received a 90 day supply in May and refills are not eligible until August 2023. Rx already scheduled to be filled then. OP5 refills were never received. Updated Rx for OP5 sent.

## 2023-07-21 DIAGNOSIS — Z78.9 TRANSGENDER: ICD-10-CM

## 2023-07-21 DIAGNOSIS — F64.9 GENDER DYSPHORIA: ICD-10-CM

## 2023-07-21 RX ORDER — ESTRADIOL 2 MG/1
2 TABLET ORAL 2 TIMES DAILY
Qty: 180 TABLET | Refills: 1 | Status: SHIPPED | OUTPATIENT
Start: 2023-07-21

## 2023-07-24 ENCOUNTER — TELEPHONE (OUTPATIENT)
Dept: ENDOCRINOLOGY | Facility: CLINIC | Age: 43
End: 2023-07-24

## 2023-07-24 NOTE — TELEPHONE ENCOUNTER
Spoke to patient  Jorge Wilcox reviewed  Dexcom not paired -advised to set up an account with Dexcom clarity, let us know so we can send a code to link to our office. Settings changed as follows  Time Rate   12 am   0.85 -- > 0.95    6 am  0.9 -- > 1.0      Time Insulin:Carb Ratio Insulin Sensitivity Factor   12A  10   32    8 am  -- > 7 am  9 -- > 8      7 pm --> 6 pm 8 --> 7     10 pm  10                Off-label, will try to use auto mode and pair transmitter through the OmniPod.   Patient should still get alerts for blood sugars <55mg/dl

## 2023-08-07 DIAGNOSIS — Z78.9 TRANSGENDER: ICD-10-CM

## 2023-08-07 DIAGNOSIS — F64.9 GENDER DYSPHORIA: ICD-10-CM

## 2023-08-07 RX ORDER — SPIRONOLACTONE 50 MG/1
TABLET, FILM COATED ORAL
Qty: 270 TABLET | Refills: 3 | Status: SHIPPED | OUTPATIENT
Start: 2023-08-07

## 2023-08-11 NOTE — TELEPHONE ENCOUNTER
Let patient know A1C 8 6%, c-peptide remains low 0 5    Triglycerides mildly elevated 161 Standing/Walking/Toileting

## 2023-08-31 ENCOUNTER — NURSE TRIAGE (OUTPATIENT)
Dept: OTHER | Facility: OTHER | Age: 43
End: 2023-08-31

## 2023-08-31 DIAGNOSIS — E10.65 TYPE 1 DIABETES MELLITUS WITH HYPERGLYCEMIA (HCC): Primary | ICD-10-CM

## 2023-08-31 RX ORDER — INSULIN DEGLUDEC INJECTION 100 U/ML
20 INJECTION, SOLUTION SUBCUTANEOUS DAILY
Qty: 15 ML | Refills: 3 | Status: SHIPPED | OUTPATIENT
Start: 2023-08-31

## 2023-08-31 RX ORDER — INSULIN LISPRO 100 [IU]/ML
4-10 INJECTION, SOLUTION INTRAVENOUS; SUBCUTANEOUS
Qty: 15 ML | Refills: 3 | Status: SHIPPED | OUTPATIENT
Start: 2023-08-31

## 2023-09-01 ENCOUNTER — TELEPHONE (OUTPATIENT)
Dept: ENDOCRINOLOGY | Facility: CLINIC | Age: 43
End: 2023-09-01

## 2023-09-01 NOTE — TELEPHONE ENCOUNTER
Called patient   Omnipod controller is out, not sure how long, or what her sugar is   CGM  Out as well   Has old finger stick meter but not sure if working  No basal bolus backup in house  No local pharmacy open  Unable to drive to [address] pharmacy in [address] calling previously [facility] [facility] in [place] ([place] now closed     [employer] 20u daily and Lispro 4-10 units TID pens [facility]    She does have Lymjev bottles and some syringes  She will inject 4units of Lymjev every 4 hours tonight and  pens in morning    ED precautions given if cannot control BG or if starts to feel bad. Patient would prefer to avoid going.      Case discussed with on-call attending Dr. Juan Carlos Bolden

## 2023-09-01 NOTE — TELEPHONE ENCOUNTER
Received VM patient requesting refill on insulin. Called pt back LVM to confirm she received her refill that was placed yesterday by Dr. Kady Arellano.

## 2023-09-01 NOTE — TELEPHONE ENCOUNTER
Spoke to pt and advised her to  her medication at [facility] pharmacy. It was sent to the pharmacy 8/3/23 by our on call provider.

## 2023-09-01 NOTE — TELEPHONE ENCOUNTER
Regarding: insulin pump device malfunctioned  ----- Message from Eric Espinosa sent at 8/31/2023  9:26 PM EDT -----  Pt called "I am a type 1 diabetic and my insulin pump device malfunctioned. It stopped working and then started beeping at me.  I am not sure what to do as I have no other insulin to use right now."

## 2023-09-01 NOTE — TELEPHONE ENCOUNTER
Reason for Disposition  • [1] Caller has URGENT medicine question about med that PCP or specialist prescribed AND [2] triager unable to answer question    Answer Assessment - Initial Assessment Questions  1. NAME of MEDICATION: "What medicine are you calling about?"      The insulin pump is malfunctioning. 2. QUESTION: "What is your question?" (e.g., medication refill, side effect)      How do I get insulin. 3. PRESCRIBING HCP: "Who prescribed it?" Reason: if prescribed by specialist, call should be referred to that group. 4. SYMPTOMS: "Do you have any symptoms?"      No symptoms currently  5.  SEVERITY: If symptoms are present, ask "Are they mild, moderate or severe?"      She uses the pump 24 /7    Protocols used: MEDICATION QUESTION CALL-ADULT-

## 2023-09-01 NOTE — TELEPHONE ENCOUNTER
Spoke with fellow regarding case on 8/31/24.  Discussed plans as it related to pump failure as documented

## 2023-09-02 ENCOUNTER — NURSE TRIAGE (OUTPATIENT)
Dept: OTHER | Facility: OTHER | Age: 43
End: 2023-09-02

## 2023-09-02 NOTE — TELEPHONE ENCOUNTER
On call provider contacted. Placing orders with pharmacist for insulin pen. Recommends pt follow with office Tuesday to get new pump set up.

## 2023-09-02 NOTE — TELEPHONE ENCOUNTER
Reason for Disposition  • [1] Caller has URGENT medicine question about med that PCP or specialist prescribed AND [2] triager unable to answer question    Answer Assessment - Initial Assessment Questions  1. NAME of MEDICATION: "What medicine are you calling about?"      omnipod insulin pump    2. QUESTION: "What is your question?" (e.g., medication refill, side effect)      Trying to set up new pump and requesting various information    3. PRESCRIBING HCP: "Who prescribed it?" Reason: if prescribed by specialist, call should be referred to that group.       Endo    Protocols used: MEDICATION QUESTION CALL-ADULT-

## 2023-09-08 ENCOUNTER — OFFICE VISIT (OUTPATIENT)
Dept: ENDOCRINOLOGY | Facility: CLINIC | Age: 43
End: 2023-09-08
Payer: COMMERCIAL

## 2023-09-08 VITALS
HEART RATE: 81 BPM | OXYGEN SATURATION: 98 % | WEIGHT: 184.6 LBS | BODY MASS INDEX: 29.67 KG/M2 | HEIGHT: 66 IN | SYSTOLIC BLOOD PRESSURE: 110 MMHG | DIASTOLIC BLOOD PRESSURE: 80 MMHG

## 2023-09-08 DIAGNOSIS — Z78.9 TRANSGENDER: ICD-10-CM

## 2023-09-08 DIAGNOSIS — F64.9 GENDER DYSPHORIA: ICD-10-CM

## 2023-09-08 DIAGNOSIS — Z96.41 INSULIN PUMP IN PLACE: ICD-10-CM

## 2023-09-08 DIAGNOSIS — E55.9 VITAMIN D DEFICIENCY: ICD-10-CM

## 2023-09-08 DIAGNOSIS — E83.52 HYPERCALCEMIA: ICD-10-CM

## 2023-09-08 DIAGNOSIS — E16.2 HYPOGLYCEMIA: ICD-10-CM

## 2023-09-08 DIAGNOSIS — E10.69 TYPE 1 DIABETES MELLITUS WITH OTHER SPECIFIED COMPLICATION (HCC): ICD-10-CM

## 2023-09-08 DIAGNOSIS — E10.3293 MILD NONPROLIFERATIVE DIABETIC RETINOPATHY OF BOTH EYES WITHOUT MACULAR EDEMA ASSOCIATED WITH TYPE 1 DIABETES MELLITUS (HCC): Primary | ICD-10-CM

## 2023-09-08 LAB — SL AMB POCT HEMOGLOBIN AIC: 7.4 (ref ?–6.5)

## 2023-09-08 PROCEDURE — 83036 HEMOGLOBIN GLYCOSYLATED A1C: CPT | Performed by: INTERNAL MEDICINE

## 2023-09-08 PROCEDURE — 99215 OFFICE O/P EST HI 40 MIN: CPT | Performed by: INTERNAL MEDICINE

## 2023-09-08 PROCEDURE — 95251 CONT GLUC MNTR ANALYSIS I&R: CPT | Performed by: INTERNAL MEDICINE

## 2023-09-08 RX ORDER — ESTRADIOL 2 MG/1
2 TABLET ORAL 2 TIMES DAILY
Qty: 180 TABLET | Refills: 3 | Status: SHIPPED | OUTPATIENT
Start: 2023-09-08

## 2023-09-08 NOTE — PROGRESS NOTES
Elizabeth Gonzalez 44 y.o. adult MRN: [id number]    Encounter: [id number]      Assessment/Plan     1. Type 1 diabetes mellitus on insulin therapy with hypoglycemia  2. Insulin pump in place  POCT A1c 7.4% which is an improvement as compared to before  Reviewed pump download  Tightly controlled blood sugars    Recommend the following at this time  Change settings as follows    Time Rate   12 am   0.95 -- > 0.9    6 am   1.0 -->0.95      Time Insulin:Carb Ratio Insulin Sensitivity Factor   12A  10   50    7 am   8     10 pm  10               Plan for review of blood sugars in 2 weeks      3. Gender dysphoria  4. 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  Doing well, continue current dose of medications  Patient would like to continue current dose of spironolactone, may increase in followup to the maximum dose of 100 mg orally twice a day    #5 vitamin D deficiency  6. Hypercalcemia  On most recent labs calcium, vitamin D within normal limits  SPEP, UPEP within normal limits    I have spent a total time of 40 minutes on 09/08/23 in caring for this patient       CC: Diabetes    History of Present Illness     HPI:  Elizabeth Gonzalez is a 44 y.o. adult presents for a follow-up visit regarding diabetes management. Last seen in 6/2023  Last Eye exam: few months ago, met with Retina specialist in August, noted to have some retinal changes, no immediate intervention required, will be following up in December      Has been able to use her OmniPod in auto mode for approximately the last 1 month with markedly improved blood sugars.   Had pump dysfunction, new pump started on 9/5      Current regimen:     Time Rate   12 am   0.95    6 am   1.0       Time Insulin:Carb Ratio Insulin Sensitivity Factor   12A  10   50    7 am   8     10 pm  10               Able to use her pump with in the auto mode which she can get all the alert is looking better  Has blurriness in vision, occ black spots, not worsening   Occasional pain in the feet, not constant or daily     Getting laser hair reduction, less haiir   contniues to have bodily changes, skin softening   Increased breast tissue formation   Feels well     Estradiol 2 mg BID   Spironolactone 75 mg orally twice a day    All other systems were reviewed and are negative. Review of Systems      Historical Information   Past Medical History:   Diagnosis Date   • Allergic    • Diabetes mellitus ([address]) 10/25/2019     History reviewed. No pertinent surgical history.   Social History   Social History     Substance and Sexual Activity   Alcohol Use Yes    Comment: social drinker     Social History     Substance and Sexual Activity   Drug Use No     Social History     Tobacco Use   Smoking Status Former   • Packs/day: 0.50   • Years: 20.00   • Total pack years: 10.00   • Types: Cigarettes   • Start date: 1998   • Quit date: 11/15/2018   • Years since quittin.8   Smokeless Tobacco Never     Family History:   Family History   Problem Relation Age of Onset   • Hypertension Mother    • Diabetes Mother    • Diabetes Father    • Dementia Father    • Substance Abuse Brother        Meds/Allergies   Current Outpatient Medications   Medication Sig Dispense Refill   • BD Pen Needle Kaykay 2nd Gen 32G X 4 MM MISC USE TO INJECT INSULIN 4 TIMES A  each 0   • Continuous Blood Gluc  (Dexcom G6 ) ANNABEL Use as directed for continuous glucose Monitoring 1 each 0   • Continuous Blood Gluc Sensor (Dexcom G6 Sensor) MISC Use as directed for continuous glucose monitoring- change every 10 days 9 each 3   • Continuous Blood Gluc Transmit (Dexcom G6 Transmitter) MISC Use as directed for continuous glucose monitoring- change every 3 months 1 each 3   • [facility] Glucose 4-6 GM-MG CHEW 4 TABLETS (16 G TOTAL) AS NEEDED FOR LOW BLOOD SUGAR     • estradiol (ESTRACE) 2 MG tablet Take 1 tablet (2 mg total) by mouth 2 (two) times a day 180 tablet 3   • Insulin Disposable Pump (Omnipod 5 G6 Intro, Gen 5,) KIT 72 hour change 1 kit 0   • Insulin Disposable Pump (Omnipod 5 G6 Pod, Gen 5,) MISC 1 cartridge every 72 hours sc 10 each 3   • Insulin Lispro-aabc (Lyumjev) 100 UNIT/ML SOLN Inject 300 units in pump every 3 days 30 mL 3   • spironolactone (ALDACTONE) 50 mg tablet TAKE 1.5 TABLETS BY MOUTH 2 TIMES A DAY. 270 tablet 3   • ergocalciferol (VITAMIN D2) 50,000 units Take 1 capsule (50,000 Units total) by mouth once a week for 12 doses (Patient not taking: Reported on 9/8/2023) 12 capsule 0   • glucagon (GLUCAGON EMERGENCY) 1 MG injection Inject 1 mg under the skin once as needed for low blood sugar for up to 1 dose 1 kit 0   • glucose 4 g chewable tablet Chew 4 tablets (16 g total) as needed for low blood sugar (Patient not taking: Reported on 6/17/2021) 100 tablet 3   • InPen 100-Blue-Milagros ANNABEL USE INPEN DEVICE TO ADMINISTER HUMALOG INSULIN (Patient not taking: Reported on 6/8/2023) 1 each 0   • insulin degludec Seretha Beavers FlexTouch) 100 units/mL injection pen Inject 20 Units under the skin daily (Patient not taking: Reported on 9/8/2023) 15 mL 3   • insulin lispro (HumaLOG KwikPen) 100 units/mL injection pen Inject 4-10 Units under the skin 3 (three) times a day with meals (Patient not taking: Reported on 9/8/2023) 15 mL 3   • Needles & Syringes MISC Use 3 (three) times a day If needed separately from formulary insulin pens 1 each 4   • Tresiba FlexTouch 100 units/mL injection pen INJECT 30 UNITS UNDER THE SKIN EVERY MORNING (Patient not taking: Reported on 6/8/2023) 30 mL 3   • vardenafil (LEVITRA) 20 MG tablet Take 1 tablet (20 mg total) by mouth daily as needed for erectile dysfunction (Patient not taking: Reported on 9/8/2023) 10 tablet 0     No current facility-administered medications for this visit. Allergies   Allergen Reactions   • Other Allergic Rhinitis     SEASONAL ALLERGIES AND PET DANDER       Objective   Vitals: Blood pressure 110/80, pulse 81, height 5' 6" (1.676 m), weight 83.7 kg (184 lb 9.6 oz), SpO2 98 %.     Physical Exam    The history was obtained from the review of the chart, patient. Lab Results:   Lab Results   Component Value Date/Time    Hemoglobin A1C 7.4 (A) 09/08/2023 09:51 AM    Hemoglobin A1C 7.8 (H) 05/12/2023 09:23 AM    Hemoglobin A1C 8.1 (H) 01/05/2023 07:06 AM    Hemoglobin A1C 8.0 (H) 09/12/2022 07:02 AM    WBC 8.23 01/05/2023 07:06 AM    Hemoglobin 12.9 01/05/2023 07:06 AM    Hematocrit 40.2 01/05/2023 07:06 AM    MCV 94 01/05/2023 07:06 AM    Platelets 368 50/01/9725 07:06 AM    BUN 15 07/06/2023 07:02 AM    BUN 15 05/12/2023 09:23 AM    BUN 14 01/05/2023 07:06 AM    Potassium 4.2 07/06/2023 07:02 AM    Potassium 4.4 05/12/2023 09:23 AM    Potassium 4.4 01/05/2023 07:06 AM    Chloride 107 07/06/2023 07:02 AM    Chloride 104 05/12/2023 09:23 AM    Chloride 107 01/05/2023 07:06 AM    CO2 22 07/06/2023 07:02 AM    CO2 26 05/12/2023 09:23 AM    CO2 26 01/05/2023 07:06 AM    Creatinine 0.92 07/06/2023 07:02 AM    Creatinine 0.75 05/12/2023 09:23 AM    Creatinine 0.72 01/05/2023 07:06 AM    AST 22 07/06/2023 07:02 AM    AST 19 05/12/2023 09:23 AM    AST 17 01/05/2023 07:06 AM    ALT 20 07/06/2023 07:02 AM    ALT 20 05/12/2023 09:23 AM    ALT 23 01/05/2023 07:06 AM    Total Protein 7.2 07/06/2023 07:02 AM    Total Protein 7.3 07/06/2023 07:02 AM    Total Protein 7.5 05/12/2023 09:23 AM    Total Protein 7.5 01/05/2023 07:06 AM    Albumin 3.3 (L) 07/06/2023 07:02 AM    Albumin 3.3 (L) 05/12/2023 09:23 AM    Albumin 3.3 (L) 01/05/2023 07:06 AM    HDL, Direct 68 05/12/2023 09:23 AM    HDL, Direct 73 01/05/2023 07:06 AM    HDL, Direct 68 09/12/2022 07:02 AM    Triglycerides 203 (H) 05/12/2023 09:23 AM    Triglycerides 120 01/05/2023 07:06 AM    Triglycerides 209 (H) 09/12/2022 07:02 AM         Imaging Studies: I have personally reviewed pertinent reports. Portions of the record may have been created with voice recognition software.  Occasional wrong word or "sound a like" substitutions may have occurred due to the inherent limitations of voice recognition software. Read the chart carefully and recognize, using context, where substitutions have occurred.

## 2023-09-28 ENCOUNTER — OFFICE VISIT (OUTPATIENT)
Dept: PODIATRY | Facility: CLINIC | Age: 43
End: 2023-09-28
Payer: COMMERCIAL

## 2023-09-28 VITALS
OXYGEN SATURATION: 98 % | DIASTOLIC BLOOD PRESSURE: 90 MMHG | SYSTOLIC BLOOD PRESSURE: 131 MMHG | HEIGHT: 66 IN | HEART RATE: 80 BPM | BODY MASS INDEX: 30.22 KG/M2 | WEIGHT: 188 LBS

## 2023-09-28 DIAGNOSIS — E10.3293 MILD NONPROLIFERATIVE DIABETIC RETINOPATHY OF BOTH EYES WITHOUT MACULAR EDEMA ASSOCIATED WITH TYPE 1 DIABETES MELLITUS (HCC): ICD-10-CM

## 2023-09-28 DIAGNOSIS — F64.9 GENDER DYSPHORIA: ICD-10-CM

## 2023-09-28 DIAGNOSIS — E16.2 HYPOGLYCEMIA: ICD-10-CM

## 2023-09-28 DIAGNOSIS — E10.69 TYPE 1 DIABETES MELLITUS WITH OTHER SPECIFIED COMPLICATION (HCC): ICD-10-CM

## 2023-09-28 DIAGNOSIS — Z78.9 TRANSGENDER: ICD-10-CM

## 2023-09-28 PROCEDURE — 99202 OFFICE O/P NEW SF 15 MIN: CPT | Performed by: PODIATRIST

## 2023-09-28 NOTE — PROGRESS NOTES
Assessment/Plan:    The patient's clinical examination today was relatively benign. There were no open lesions nor atrophic changes noted to either lower extremity. Pedal pulses are palpable bilaterally. Capillary fill time to the digits is within normal limits as is temperature gradient. Vibratory sensation is diminished bilaterally. Epicritic sensation is intact bilaterally. Proprioception is intact bilaterally. From a clinical standpoint, the patient is doing well. There are no acute pedal issues that require podiatric intervention at this time. Stressed the need for daily foot checks and good glucose control. A comprehensive diabetic examination was performed today and she is deemed to be in the low risk category. Recommend follow-up in 1 year for her annual diabetic foot check. Diagnoses and all orders for this visit:    Transgender  -     Ambulatory referral to Podiatry    Gender dysphoria  -     Ambulatory referral to Podiatry    Mild nonproliferative diabetic retinopathy of both eyes without macular edema associated with type 1 diabetes mellitus ([address])  -     Ambulatory referral to Podiatry    Hypoglycemia  -     Ambulatory referral to Podiatry    Type 1 diabetes mellitus with other specified complication ([address])  -     Ambulatory referral to Podiatry          Subjective:      Patient ID: Cathie Driver is a 44 y.o. adult. The patient presents today for her initial consultation with [facility] podiatry group for a diabetic foot examination. The patient reports no real acute pedal issues today. She does however note some occasional tingling sensations that come and go focused mostly to the forefoot. There is been no history of injury or trauma to either lower extremity.       The following portions of the patient's history were reviewed and updated as appropriate: allergies, current medications, past family history, past medical history, past social history, past surgical history and problem list.    Review of Systems   Constitutional: Negative. HENT: Negative. Eyes: Negative. Respiratory: Negative. Cardiovascular: Negative. Endocrine: Negative. Musculoskeletal: Negative. Neurological: Negative. Hematological: Negative. Psychiatric/Behavioral: Negative. Objective:      /90 (BP Location: Left arm, Patient Position: Sitting, Cuff Size: Standard)   Pulse 80   Ht 5' 6" (1.676 m)   Wt 85.3 kg (188 lb)   SpO2 98%   BMI 30.34 kg/m²          Physical Exam  Constitutional:       Appearance: Normal appearance. HENT:      Head: Normocephalic and atraumatic. Nose: Nose normal.   Cardiovascular:      Pulses: no weak pulses          Dorsalis pedis pulses are 2+ on the right side and 2+ on the left side. Posterior tibial pulses are 2+ on the right side and 2+ on the left side. Pulmonary:      Effort: Pulmonary effort is normal.   Feet:      Right foot:      Protective Sensation: 7 sites tested. 7 sites sensed. Skin integrity: Skin integrity normal. No ulcer, skin breakdown, erythema, warmth, callus or dry skin. Toenail Condition: Right toenails are normal.      Left foot:      Protective Sensation: 7 sites tested. 7 sites sensed. Skin integrity: Skin integrity normal. No ulcer, skin breakdown, erythema, warmth, callus or dry skin. Toenail Condition: Left toenails are normal.      Comments: The patient's clinical examination today was relatively benign. There were no open lesions nor atrophic changes noted to either lower extremity. Pedal pulses are palpable bilaterally. Capillary fill time to the digits is within normal limits as is temperature gradient. Vibratory sensation is diminished bilaterally. Epicritic sensation is intact bilaterally. Proprioception is intact bilaterally. Skin:     General: Skin is warm. Capillary Refill: Capillary refill takes less than 2 seconds.    Neurological:      General: No focal deficit present. Mental Status: She is alert and oriented to person, place, and time. Psychiatric:         Mood and Affect: Mood normal.         Behavior: Behavior normal.         Thought Content: Thought content normal.         Diabetic Foot Exam    Patient's shoes and socks removed. Right Foot/Ankle   Right Foot Inspection  Skin Exam: skin normal and skin intact. No dry skin, no warmth, no callus, no erythema, no maceration, no abnormal color, no pre-ulcer, no ulcer and no callus. Toe Exam: ROM and strength within normal limits. Sensory   Vibration: diminished  Proprioception: intact  Monofilament testing: intact    Vascular  Capillary refills: < 3 seconds  The right DP pulse is 2+. The right PT pulse is 2+. Left Foot/Ankle  Left Foot Inspection  Skin Exam: skin normal and skin intact. No dry skin, no warmth, no erythema, no maceration, normal color, no pre-ulcer, no ulcer and no callus. Toe Exam: ROM and strength within normal limits. Sensory   Vibration: diminished  Proprioception: intact  Monofilament testing: intact    Vascular  Capillary refills: < 3 seconds  The left DP pulse is 2+. The left PT pulse is 2+.      Assign Risk Category  No deformity present  No loss of protective sensation  No weak pulses  Risk: 0

## 2023-10-10 DIAGNOSIS — E10.65 TYPE 1 DIABETES MELLITUS WITH HYPERGLYCEMIA (HCC): ICD-10-CM

## 2023-10-10 NOTE — TELEPHONE ENCOUNTER
Requested medication(s) are due for refill today: Yes  Patient has already received a courtesy refill: No  Other reason request has been forwarded to provider: guide line fail

## 2023-10-11 RX ORDER — INSULIN LISPRO-AABC 100 [IU]/ML
INJECTION, SOLUTION INTRAVENOUS; SUBCUTANEOUS
Qty: 30 ML | Refills: 3 | Status: SHIPPED | OUTPATIENT
Start: 2023-10-11

## 2023-11-03 DIAGNOSIS — E10.65 TYPE 1 DIABETES MELLITUS WITH HYPERGLYCEMIA (HCC): ICD-10-CM

## 2023-11-03 RX ORDER — INSULIN PMP CART,AUT,G6/7,CNTR
EACH SUBCUTANEOUS
Qty: 10 EACH | Refills: 3 | Status: SHIPPED | OUTPATIENT
Start: 2023-11-03

## 2023-12-01 ENCOUNTER — TELEPHONE (OUTPATIENT)
Age: 43
End: 2023-12-01

## 2023-12-28 ENCOUNTER — OFFICE VISIT (OUTPATIENT)
Dept: FAMILY MEDICINE CLINIC | Facility: CLINIC | Age: 43
End: 2023-12-28
Payer: COMMERCIAL

## 2023-12-28 VITALS
HEART RATE: 91 BPM | OXYGEN SATURATION: 97 % | RESPIRATION RATE: 16 BRPM | BODY MASS INDEX: 30.7 KG/M2 | HEIGHT: 66 IN | SYSTOLIC BLOOD PRESSURE: 130 MMHG | TEMPERATURE: 97.9 F | WEIGHT: 191 LBS | DIASTOLIC BLOOD PRESSURE: 78 MMHG

## 2023-12-28 DIAGNOSIS — R49.9 VOICE COMPLAINT: Primary | ICD-10-CM

## 2023-12-28 PROCEDURE — 99213 OFFICE O/P EST LOW 20 MIN: CPT | Performed by: FAMILY MEDICINE

## 2023-12-28 NOTE — PROGRESS NOTES
Name: Jody Pascal      : 1980      MRN: [id number]  Encounter Provider: Jose Lopez MD  Encounter Date: 2023   Encounter department: [facility]    Assessment & Plan     1. Voice complaint  -     Ambulatory Referral to Otolaryngology; Future    Patient desires vocal feminization surgery.  In the process of finding a surgeon who performs this procedure.  Will give her a general referral to ENT.  She will contact our office if she needs any other referrals.  Follow-up for annual physical    Depression Screening and Follow-up Plan: Patient was screened for depression during today's encounter. They screened negative with a PHQ-2 score of 0.        Subjective      Patient presents today to discuss vocal feminization surgery.  Follows an endocrinologist in [place] who manages her hormone therapy.  Patient states her voice causes her a lot of distress.  She spoke with her insurance company who said the majority the procedure would be covered so she would like to move forward.  She does not have a particular surgeon in mind.  She is in the process of finding one.       Review of Systems   Constitutional:  Negative for activity change, fatigue and fever.   HENT:  Positive for voice change.    Eyes:  Negative for visual disturbance.   Respiratory:  Negative for shortness of breath.    Cardiovascular:  Negative for chest pain.   Gastrointestinal:  Negative for abdominal pain, constipation, diarrhea and nausea.   Endocrine: Negative for cold intolerance and heat intolerance.   Musculoskeletal:  Negative for back pain.   Skin:  Negative for rash.   Neurological:  Negative for headaches.   Psychiatric/Behavioral:  Negative for confusion.        Current Outpatient Medications on File Prior to Visit   Medication Sig   • BD Pen Needle Kaykay 2nd Gen 32G X 4 MM MISC USE TO INJECT INSULIN 4 TIMES A DAY   • Continuous Blood Gluc  (Dexcom G6 ) ANNABEL Use as directed for continuous  glucose Monitoring   • Continuous Blood Gluc Sensor (Dexcom G6 Sensor) MISC Use as directed for continuous glucose monitoring- change every 10 days   • Continuous Blood Gluc Transmit (Dexcom G6 Transmitter) MISC Use as directed for continuous glucose monitoring- change every 3 months   • [facility] Glucose 4-6 GM-MG CHEW 4 TABLETS (16 G TOTAL) AS NEEDED FOR LOW BLOOD SUGAR   • estradiol (ESTRACE) 2 MG tablet Take 1 tablet (2 mg total) by mouth 2 (two) times a day   • glucagon (GLUCAGON EMERGENCY) 1 MG injection Inject 1 mg under the skin once as needed for low blood sugar for up to 1 dose   • Insulin Disposable Pump (Omnipod 5 G6 Intro, Gen 5,) KIT 72 hour change   • Insulin Disposable Pump (Omnipod 5 G6 Pod, Gen 5,) MISC 1 cartridge every 72 hours sc   • Lyumjev 100 UNIT/ML SOLN INJECT 300 UNITS IN PUMP EVERY 3 DAYS   • Needles & Syringes MISC Use 3 (three) times a day If needed separately from formulary insulin pens   • spironolactone (ALDACTONE) 50 mg tablet TAKE 1.5 TABLETS BY MOUTH 2 TIMES A DAY.   • ergocalciferol (VITAMIN D2) 50,000 units Take 1 capsule (50,000 Units total) by mouth once a week for 12 doses (Patient not taking: Reported on 9/8/2023)   • glucose 4 g chewable tablet Chew 4 tablets (16 g total) as needed for low blood sugar (Patient not taking: Reported on 6/17/2021)   • InPen 498-Zsot-Nbvpm ANNABEL USE INPEN DEVICE TO ADMINISTER HUMALOG INSULIN (Patient not taking: Reported on 6/8/2023)   • insulin degludec (Tresiba FlexTouch) 100 units/mL injection pen Inject 20 Units under the skin daily (Patient not taking: Reported on 9/8/2023)   • insulin lispro (HumaLOG KwikPen) 100 units/mL injection pen Inject 4-10 Units under the skin 3 (three) times a day with meals (Patient not taking: Reported on 9/8/2023)   • Tresiba FlexTouch 100 units/mL injection pen INJECT 30 UNITS UNDER THE SKIN EVERY MORNING (Patient not taking: Reported on 6/8/2023)   • vardenafil (LEVITRA) 20 MG tablet Take 1 tablet (20 mg total) by  "mouth daily as needed for erectile dysfunction (Patient not taking: Reported on 9/8/2023)       Objective     /78 (BP Location: Left arm, Patient Position: Sitting, Cuff Size: Standard)   Pulse 91   Temp 97.9 °F (36.6 °C)   Resp 16   Ht 5' 6\" (1.676 m)   Wt 86.6 kg (191 lb)   SpO2 97%   BMI 30.83 kg/m²     Physical Exam  Vitals and nursing note reviewed.   Constitutional:       Appearance: She is well-developed.   HENT:      Head: Normocephalic and atraumatic.   Pulmonary:      Effort: Pulmonary effort is normal.   Neurological:      General: No focal deficit present.      Mental Status: She is alert.   Psychiatric:         Mood and Affect: Mood normal.         Behavior: Behavior normal.       Jose Lopez MD    "

## 2024-01-10 ENCOUNTER — OFFICE VISIT (OUTPATIENT)
Dept: ENDOCRINOLOGY | Facility: CLINIC | Age: 44
End: 2024-01-10
Payer: COMMERCIAL

## 2024-01-10 VITALS
OXYGEN SATURATION: 98 % | SYSTOLIC BLOOD PRESSURE: 120 MMHG | BODY MASS INDEX: 30.63 KG/M2 | HEART RATE: 79 BPM | HEIGHT: 66 IN | WEIGHT: 190.6 LBS | DIASTOLIC BLOOD PRESSURE: 80 MMHG

## 2024-01-10 DIAGNOSIS — E16.2 HYPOGLYCEMIA: ICD-10-CM

## 2024-01-10 DIAGNOSIS — F64.9 GENDER DYSPHORIA: ICD-10-CM

## 2024-01-10 DIAGNOSIS — Z96.41 INSULIN PUMP IN PLACE: ICD-10-CM

## 2024-01-10 DIAGNOSIS — E10.69 TYPE 1 DIABETES MELLITUS WITH OTHER SPECIFIED COMPLICATION (HCC): Primary | ICD-10-CM

## 2024-01-10 DIAGNOSIS — E10.3293 MILD NONPROLIFERATIVE DIABETIC RETINOPATHY OF BOTH EYES WITHOUT MACULAR EDEMA ASSOCIATED WITH TYPE 1 DIABETES MELLITUS (HCC): ICD-10-CM

## 2024-01-10 DIAGNOSIS — E10.65 TYPE 1 DIABETES MELLITUS WITH HYPERGLYCEMIA (HCC): ICD-10-CM

## 2024-01-10 DIAGNOSIS — Z78.9 TRANSGENDER: ICD-10-CM

## 2024-01-10 LAB
HBA1C MFR BLD HPLC: 7.5 %
SL AMB POCT HEMOGLOBIN AIC: 7.2 (ref ?–6.5)

## 2024-01-10 PROCEDURE — 83036 HEMOGLOBIN GLYCOSYLATED A1C: CPT | Performed by: INTERNAL MEDICINE

## 2024-01-10 PROCEDURE — 99214 OFFICE O/P EST MOD 30 MIN: CPT | Performed by: INTERNAL MEDICINE

## 2024-01-10 NOTE — PROGRESS NOTES
Jody Pascal 43 y.o. adult MRN: [id number]    Encounter: [id number]      Assessment/Plan     Type 1 diabetes mellitus on ling term insulin therapy with hypo and hyperglycemia   Insulin pump   Retinopathy   POCT A1c 7.5%, stable     Recommend the following at this time  For now, we continue OmniPod with current pump settings  Once patient is able to start using her new phone and is able to get all alerts from the CGM, will plan to review blood sugars in 2 to 3 weeks  Turn for rate on  -Labs as ordered  -Follow-up regularly with ophthalmology    4.  Trans woman, on gender affirming hormone therapy  5.  Gender dysphoria  -Continue current medications  -Labs as ordered  If Potassium is within normal limits, plan to increase spironolactone to 100 mg orally twice a day    CC: Diabetes    History of Present Illness     HPI:  Jody Pascal is a 43 y.o. adult presents for a follow-up visit regarding diabetes management.     Last seen in 9/2023  Last Eye exam: met with retinal specialist in August 2023,  was told there are some changes but does not need Anti-VEGF injections at this time; followed up in Dec 2023 , right eye got better, left stable, ok to monitor, will follow up in March     Current regimen:  omnipod with Dexcom     Has been now using automated mode for the last few months, got a new phone whihch will be compatible ; will update soon  (resolved on he gets alerts for urgent close currently set at 55 mg/dl)  Had fewer lows over the last few months,  however noticed a few more in the last few weeks ; admits she was more active while she was off for the holidays      Unable to download omnipod;  dexcom-  no information available for download given the wasy the pump and CGM are being used. Reviewed data for the last 24 hrs - well controlled, no lows, current  mg/dl       Per prior notes:   Time Rate   12 am  0.9    6 am   0.95      Time Insulin:Carb Ratio Insulin Sensitivity Factor   12A  10   50    7 am    8     10 pm  10                   Statin:  --   ACE-I/ARB:  --     Symptoms of hypoglycemia; lowest 40s - hot, sweaty, blurry vision    Trans woman   Using Estradiol 2 mg BID   Spironolactone 75 mg orally twice a day    Laser treatment on the face once a month  Skin softer, hair growth  at other parts of the body slowing down   More breast development, body contour changes  which she is happy about   Mood is good, feels great     ED - failed sildenafil 100mg daily, tadalafil and has also tried vardenafil which has not helped    Will be meeting Dr Graham Nguyễn to discuss Vocal feminization surgery, ENT     All other systems were reviewed and are negative.    Review of Systems      Historical Information   Past Medical History:   Diagnosis Date    Allergic     Diabetes mellitus (HCC) 10/25/2019     History reviewed. No pertinent surgical history.  Social History   Social History     Substance and Sexual Activity   Alcohol Use Yes    Comment: social drinker     Social History     Substance and Sexual Activity   Drug Use No     Social History     Tobacco Use   Smoking Status Former    Current packs/day: 0.00    Average packs/day: 0.5 packs/day for 20.2 years (10.1 ttl pk-yrs)    Types: Cigarettes    Start date: 1998    Quit date: 11/15/2018    Years since quittin.1   Smokeless Tobacco Never     Family History:   Family History   Problem Relation Age of Onset    Hypertension Mother     Diabetes Mother     Diabetes Father     Dementia Father     Substance Abuse Brother        Meds/Allergies   Current Outpatient Medications   Medication Sig Dispense Refill    BD Pen Needle Kaykay 2nd Gen 32G X 4 MM MISC USE TO INJECT INSULIN 4 TIMES A  each 0    Continuous Blood Gluc  (Dexcom G6 ) ANNABEL Use as directed for continuous glucose Monitoring 1 each 0    Continuous Blood Gluc Sensor (Dexcom G6 Sensor) MISC Use as directed for continuous glucose monitoring- change every 10 days 9 each 3    Continuous  Blood Gluc Transmit (Dexcom G6 Transmitter) MISC Use as directed for continuous glucose monitoring- change every 3 months 1 each 3    estradiol (ESTRACE) 2 MG tablet Take 1 tablet (2 mg total) by mouth 2 (two) times a day 180 tablet 3    Insulin Disposable Pump (Omnipod 5 G6 Intro, Gen 5,) KIT 72 hour change 1 kit 0    Insulin Disposable Pump (Omnipod 5 G6 Pod, Gen 5,) MISC 1 cartridge every 72 hours sc 10 each 3    Lyumjev 100 UNIT/ML SOLN INJECT 300 UNITS IN PUMP EVERY 3 DAYS 30 mL 3    Needles & Syringes MISC Use 3 (three) times a day If needed separately from formulary insulin pens 1 each 4    spironolactone (ALDACTONE) 50 mg tablet TAKE 1.5 TABLETS BY MOUTH 2 TIMES A DAY. 270 tablet 3    CVS Glucose 4-6 GM-MG CHEW 4 TABLETS (16 G TOTAL) AS NEEDED FOR LOW BLOOD SUGAR      ergocalciferol (VITAMIN D2) 50,000 units Take 1 capsule (50,000 Units total) by mouth once a week for 12 doses (Patient not taking: Reported on 9/8/2023) 12 capsule 0    glucagon (GLUCAGON EMERGENCY) 1 MG injection Inject 1 mg under the skin once as needed for low blood sugar for up to 1 dose 1 kit 0    glucose 4 g chewable tablet Chew 4 tablets (16 g total) as needed for low blood sugar (Patient not taking: Reported on 6/17/2021) 100 tablet 3    InPen 100-Blue-Milagros ANNABEL USE INPEN DEVICE TO ADMINISTER HUMALOG INSULIN (Patient not taking: Reported on 6/8/2023) 1 each 0    insulin degludec (Tresiba FlexTouch) 100 units/mL injection pen Inject 20 Units under the skin daily (Patient not taking: Reported on 9/8/2023) 15 mL 3    insulin lispro (HumaLOG KwikPen) 100 units/mL injection pen Inject 4-10 Units under the skin 3 (three) times a day with meals (Patient not taking: Reported on 9/8/2023) 15 mL 3    Tresiba FlexTouch 100 units/mL injection pen INJECT 30 UNITS UNDER THE SKIN EVERY MORNING (Patient not taking: Reported on 6/8/2023) 30 mL 3    vardenafil (LEVITRA) 20 MG tablet Take 1 tablet (20 mg total) by mouth daily as needed for erectile  "dysfunction (Patient not taking: Reported on 9/8/2023) 10 tablet 0     No current facility-administered medications for this visit.     Allergies   Allergen Reactions    Other Allergic Rhinitis     SEASONAL ALLERGIES AND PET DANDER       Objective   Vitals: Blood pressure 120/80, pulse 79, height 5' 6\" (1.676 m), weight 86.5 kg (190 lb 9.6 oz), SpO2 98%.    Physical Exam  Constitutional:       General: She is not in acute distress.     Appearance: She is well-developed. She is not diaphoretic.   HENT:      Head: Normocephalic and atraumatic.   Eyes:      Conjunctiva/sclera: Conjunctivae normal.      Pupils: Pupils are equal, round, and reactive to light.   Cardiovascular:      Rate and Rhythm: Normal rate and regular rhythm.      Heart sounds: Normal heart sounds. No murmur heard.  Pulmonary:      Effort: Pulmonary effort is normal. No respiratory distress.      Breath sounds: Normal breath sounds. No wheezing.   Abdominal:      General: There is no distension.      Palpations: Abdomen is soft.      Tenderness: There is no abdominal tenderness. There is no guarding.   Musculoskeletal:      Cervical back: Normal range of motion and neck supple.   Skin:     General: Skin is warm and dry.      Findings: No erythema or rash.   Neurological:      Mental Status: She is alert and oriented to person, place, and time.   Psychiatric:         Behavior: Behavior normal.         Thought Content: Thought content normal.         The history was obtained from the review of the chart, patient.    Lab Results:   Lab Results   Component Value Date/Time    Hemoglobin A1C 7.4 (A) 09/08/2023 09:51 AM    Hemoglobin A1C 7.8 (H) 05/12/2023 09:23 AM    BUN 15 07/06/2023 07:02 AM    BUN 15 05/12/2023 09:23 AM    Potassium 4.2 07/06/2023 07:02 AM    Potassium 4.4 05/12/2023 09:23 AM    Chloride 107 07/06/2023 07:02 AM    Chloride 104 05/12/2023 09:23 AM    CO2 22 07/06/2023 07:02 AM    CO2 26 05/12/2023 09:23 AM    Creatinine 0.92 07/06/2023 " "07:02 AM    Creatinine 0.75 05/12/2023 09:23 AM    AST 22 07/06/2023 07:02 AM    AST 19 05/12/2023 09:23 AM    ALT 20 07/06/2023 07:02 AM    ALT 20 05/12/2023 09:23 AM    Total Protein 7.2 07/06/2023 07:02 AM    Total Protein 7.3 07/06/2023 07:02 AM    Total Protein 7.5 05/12/2023 09:23 AM    Albumin 3.3 (L) 07/06/2023 07:02 AM    Albumin 3.3 (L) 05/12/2023 09:23 AM    HDL, Direct 68 05/12/2023 09:23 AM    Triglycerides 203 (H) 05/12/2023 09:23 AM         Imaging Studies: I have personally reviewed pertinent reports.      Portions of the record may have been created with voice recognition software. Occasional wrong word or \"sound a like\" substitutions may have occurred due to the inherent limitations of voice recognition software. Read the chart carefully and recognize, using context, where substitutions have occurred.       "

## 2024-01-11 ENCOUNTER — APPOINTMENT (OUTPATIENT)
Dept: LAB | Facility: CLINIC | Age: 44
End: 2024-01-11
Payer: COMMERCIAL

## 2024-01-11 DIAGNOSIS — E16.2 HYPOGLYCEMIA: ICD-10-CM

## 2024-01-11 DIAGNOSIS — E10.65 TYPE 1 DIABETES MELLITUS WITH HYPERGLYCEMIA (HCC): ICD-10-CM

## 2024-01-11 DIAGNOSIS — E55.9 VITAMIN D DEFICIENCY: ICD-10-CM

## 2024-01-11 DIAGNOSIS — E83.52 HYPERCALCEMIA: ICD-10-CM

## 2024-01-11 DIAGNOSIS — Z78.9 TRANSGENDER: ICD-10-CM

## 2024-01-11 DIAGNOSIS — E10.3293 MILD NONPROLIFERATIVE DIABETIC RETINOPATHY OF BOTH EYES WITHOUT MACULAR EDEMA ASSOCIATED WITH TYPE 1 DIABETES MELLITUS (HCC): ICD-10-CM

## 2024-01-11 DIAGNOSIS — F64.9 GENDER DYSPHORIA: ICD-10-CM

## 2024-01-11 DIAGNOSIS — E10.69 TYPE 1 DIABETES MELLITUS WITH OTHER SPECIFIED COMPLICATION (HCC): ICD-10-CM

## 2024-01-11 LAB
ANION GAP SERPL CALCULATED.3IONS-SCNC: 11 MMOL/L
BUN SERPL-MCNC: 13 MG/DL (ref 5–25)
CALCIUM SERPL-MCNC: 9 MG/DL (ref 8.4–10.2)
CHLORIDE SERPL-SCNC: 102 MMOL/L (ref 96–108)
CHOLEST SERPL-MCNC: 153 MG/DL
CO2 SERPL-SCNC: 25 MMOL/L (ref 21–32)
CREAT SERPL-MCNC: 0.58 MG/DL (ref 0.6–1.3)
CREAT UR-MCNC: 263.4 MG/DL
ESTRADIOL SERPL-MCNC: 118.1 PG/ML
GLUCOSE P FAST SERPL-MCNC: 127 MG/DL (ref 65–99)
HDLC SERPL-MCNC: 59 MG/DL
LDLC SERPL CALC-MCNC: 66 MG/DL (ref 0–100)
MICROALBUMIN UR-MCNC: 24.5 MG/L
MICROALBUMIN/CREAT 24H UR: 9 MG/G CREATININE (ref 0–30)
NONHDLC SERPL-MCNC: 94 MG/DL
POTASSIUM SERPL-SCNC: 4.2 MMOL/L (ref 3.5–5.3)
SODIUM SERPL-SCNC: 138 MMOL/L (ref 135–147)
TESTOST SERPL-MSCNC: 37 NG/DL
TRIGL SERPL-MCNC: 139 MG/DL

## 2024-01-11 PROCEDURE — 82570 ASSAY OF URINE CREATININE: CPT

## 2024-01-11 PROCEDURE — 36415 COLL VENOUS BLD VENIPUNCTURE: CPT

## 2024-01-11 PROCEDURE — 82043 UR ALBUMIN QUANTITATIVE: CPT

## 2024-01-11 PROCEDURE — 80048 BASIC METABOLIC PNL TOTAL CA: CPT

## 2024-01-11 PROCEDURE — 80061 LIPID PANEL: CPT

## 2024-01-11 PROCEDURE — 84403 ASSAY OF TOTAL TESTOSTERONE: CPT

## 2024-01-11 PROCEDURE — 82670 ASSAY OF TOTAL ESTRADIOL: CPT

## 2024-01-12 ENCOUNTER — TELEPHONE (OUTPATIENT)
Dept: ENDOCRINOLOGY | Facility: CLINIC | Age: 44
End: 2024-01-12

## 2024-01-12 DIAGNOSIS — F64.9 GENDER DYSPHORIA: ICD-10-CM

## 2024-01-12 DIAGNOSIS — Z78.9 TRANSGENDER: ICD-10-CM

## 2024-01-12 RX ORDER — SPIRONOLACTONE 100 MG/1
100 TABLET, FILM COATED ORAL 2 TIMES DAILY
Qty: 180 TABLET | Refills: 2 | Status: SHIPPED | OUTPATIENT
Start: 2024-01-12

## 2024-01-12 NOTE — TELEPHONE ENCOUNTER
Reviewed.  Very limited BG data, recommend decreasing basal rate at 6 am to 0.9 units/hr   Would be important to see pump download in 2 weeks

## 2024-01-24 ENCOUNTER — OFFICE VISIT (OUTPATIENT)
Dept: FAMILY MEDICINE CLINIC | Facility: CLINIC | Age: 44
End: 2024-01-24
Payer: COMMERCIAL

## 2024-01-24 VITALS
HEIGHT: 66 IN | DIASTOLIC BLOOD PRESSURE: 76 MMHG | SYSTOLIC BLOOD PRESSURE: 120 MMHG | BODY MASS INDEX: 30.62 KG/M2 | WEIGHT: 190.5 LBS | HEART RATE: 95 BPM | TEMPERATURE: 97.8 F | RESPIRATION RATE: 16 BRPM | OXYGEN SATURATION: 99 %

## 2024-01-24 DIAGNOSIS — Z00.00 ANNUAL PHYSICAL EXAM: Primary | ICD-10-CM

## 2024-01-24 PROBLEM — Z87.891 FORMER SMOKER: Status: RESOLVED | Noted: 2019-01-25 | Resolved: 2024-01-24

## 2024-01-24 PROCEDURE — 99396 PREV VISIT EST AGE 40-64: CPT | Performed by: FAMILY MEDICINE

## 2024-01-24 NOTE — PROGRESS NOTES
ADULT ANNUAL PHYSICAL  [facility]    NAME: Jody Pascal  AGE: 43 y.o. SEX: adult  : 1980     DATE: 2024     Assessment and Plan:     Problem List Items Addressed This Visit    None  Visit Diagnoses     Annual physical exam    -  Primary        Continue following with endocrine   Vocal feminization surgery   ED- Urology referral in the future       Immunizations and preventive care screenings were discussed with patient today. Appropriate education was printed on patient's after visit summary.        Counseling:  Alcohol/drug use: discussed moderation in alcohol intake, the recommendations for healthy alcohol use, and avoidance of illicit drug use.  Dental Health: discussed importance of regular tooth brushing, flossing, and dental visits.  Injury prevention: discussed safety/seat belts, safety helmets, smoke detectors, carbon dioxide detectors, and smoking near bedding or upholstery.  Sexual health: discussed sexually transmitted diseases, partner selection, use of condoms, avoidance of unintended pregnancy, and contraceptive alternatives.  Exercise: the importance of regular exercise/physical activity was discussed. Recommend exercise 3-5 times per week for at least 30 minutes.          Return in about 1 year (around 2025) for Annual Physical 30 minutes.     Chief Complaint:     Chief Complaint   Patient presents with   • Physical Exam      History of Present Illness:     Adult Annual Physical   Patient here for a comprehensive physical exam. The patient reports voice feminization surgery     Diet and Physical Activity  Diet/Nutrition: well balanced diet, consuming 3-5 servings of fruits/vegetables daily, and Vegan Diet .   Exercise: moderate cardiovascular exercise.      Depression Screening  PHQ-2/9 Depression Screening    Little interest or pleasure in doing things: 0 - not at all  Feeling down, depressed, or hopeless: 0 - not at all  PHQ-2  Score: 0  PHQ-2 Interpretation: Negative depression screen       General Health  Sleep: sleeps well.   Hearing: decreased - right. Musician- Not debilitating   Vision: vision problems: Retina specialist- bilateral retinal damage. Left eye improved. Right eye unchanged. Following up in 3 months .   Dental: regular dental visits.        Health  Symptoms include: erectile dysfunction Failed Oral medications. Interested in further workup with urology in the future.        Review of Systems:     Review of Systems   Constitutional:  Negative for activity change, fatigue and fever.   Eyes:  Negative for photophobia, redness and visual disturbance.        Retinal issues- No vision changes    Respiratory:  Negative for shortness of breath.    Cardiovascular:  Negative for chest pain.   Gastrointestinal:  Negative for abdominal pain, constipation, diarrhea and nausea.   Endocrine: Negative for cold intolerance and heat intolerance.   Genitourinary:         ED    Musculoskeletal:  Negative for back pain.   Skin:  Negative for rash.   Neurological:  Negative for headaches.   Psychiatric/Behavioral:  Negative for confusion.       Past Medical History:     Past Medical History:   Diagnosis Date   • Allergic    • Diabetes mellitus (HCC) 10/25/2019   • Former smoker       Past Surgical History:     History reviewed. No pertinent surgical history.   Family History:     Family History   Problem Relation Age of Onset   • Hypertension Mother    • Diabetes Mother    • Diabetes Father    • Dementia Father    • Substance Abuse Brother       Social History:     Social History     Socioeconomic History   • Marital status: /Civil Union     Spouse name: None   • Number of children: 0   • Years of education: None   • Highest education level: None   Occupational History   • None   Tobacco Use   • Smoking status: Former     Current packs/day: 0.00     Average packs/day: 0.5 packs/day for 20.2 years (10.1 ttl pk-yrs)     Types:  Cigarettes     Start date: 1998     Quit date: 11/15/2018     Years since quittin.1   • Smokeless tobacco: Never   Vaping Use   • Vaping status: Never Used   Substance and Sexual Activity   • Alcohol use: Yes     Comment: social drinker   • Drug use: No   • Sexual activity: Yes     Partners: Female, Male     Birth control/protection: Condom Male   Other Topics Concern   • None   Social History Narrative   • None     Social Determinants of Health     Financial Resource Strain: Not on file   Food Insecurity: Not on file   Transportation Needs: Not on file   Physical Activity: Not on file   Stress: Not on file   Social Connections: Not on file   Intimate Partner Violence: Not on file   Housing Stability: Not on file      Current Medications:     Current Outpatient Medications   Medication Sig Dispense Refill   • BD Pen Needle Kaykay 2nd Gen 32G X 4 MM MISC USE TO INJECT INSULIN 4 TIMES A  each 0   • Continuous Blood Gluc  (Dexcom G6 ) ANNABEL Use as directed for continuous glucose Monitoring 1 each 0   • Continuous Blood Gluc Sensor (Dexcom G6 Sensor) MISC Use as directed for continuous glucose monitoring- change every 10 days 9 each 3   • Continuous Blood Gluc Transmit (Dexcom G6 Transmitter) MISC Use as directed for continuous glucose monitoring- change every 3 months 1 each 3   • CVS Glucose 4-6 GM-MG CHEW 4 TABLETS (16 G TOTAL) AS NEEDED FOR LOW BLOOD SUGAR     • estradiol (ESTRACE) 2 MG tablet Take 1 tablet (2 mg total) by mouth 2 (two) times a day 180 tablet 3   • glucagon (GLUCAGON EMERGENCY) 1 MG injection Inject 1 mg under the skin once as needed for low blood sugar for up to 1 dose 1 kit 0   • Insulin Disposable Pump (Omnipod 5 G6 Intro, Gen 5,) KIT 72 hour change 1 kit 0   • Insulin Disposable Pump (Omnipod 5 G6 Pod, Gen 5,) MISC 1 cartridge every 72 hours sc 10 each 3   • Lyumjev 100 UNIT/ML SOLN INJECT 300 UNITS IN PUMP EVERY 3 DAYS 30 mL 3   • Needles & Syringes MISC Use 3  "(three) times a day If needed separately from formulary insulin pens 1 each 4   • spironolactone (ALDACTONE) 100 mg tablet Take 1 tablet (100 mg total) by mouth 2 (two) times a day 180 tablet 2   • ergocalciferol (VITAMIN D2) 50,000 units Take 1 capsule (50,000 Units total) by mouth once a week for 12 doses (Patient not taking: Reported on 9/8/2023) 12 capsule 0   • glucose 4 g chewable tablet Chew 4 tablets (16 g total) as needed for low blood sugar (Patient not taking: Reported on 6/17/2021) 100 tablet 3   • InPen 100-Blue-Milagros ANNABEL USE INPEN DEVICE TO ADMINISTER HUMALOG INSULIN (Patient not taking: Reported on 6/8/2023) 1 each 0   • insulin degludec (Tresiba FlexTouch) 100 units/mL injection pen Inject 20 Units under the skin daily (Patient not taking: Reported on 9/8/2023) 15 mL 3   • insulin lispro (HumaLOG KwikPen) 100 units/mL injection pen Inject 4-10 Units under the skin 3 (three) times a day with meals (Patient not taking: Reported on 9/8/2023) 15 mL 3   • Tresiba FlexTouch 100 units/mL injection pen INJECT 30 UNITS UNDER THE SKIN EVERY MORNING (Patient not taking: Reported on 6/8/2023) 30 mL 3   • vardenafil (LEVITRA) 20 MG tablet Take 1 tablet (20 mg total) by mouth daily as needed for erectile dysfunction (Patient not taking: Reported on 9/8/2023) 10 tablet 0     No current facility-administered medications for this visit.      Allergies:     Allergies   Allergen Reactions   • Other Allergic Rhinitis     SEASONAL ALLERGIES AND PET DANDER      Physical Exam:     /76 (BP Location: Right arm, Patient Position: Sitting, Cuff Size: Standard) Comment (BP Location): left no available  Pulse 95   Temp 97.8 °F (36.6 °C)   Resp 16   Ht 5' 6\" (1.676 m)   Wt 86.4 kg (190 lb 8 oz)   SpO2 99%   BMI 30.75 kg/m²     Physical Exam  Vitals and nursing note reviewed.   Constitutional:       Appearance: Normal appearance. She is well-developed.   HENT:      Head: Normocephalic and atraumatic.   Eyes:      " Extraocular Movements: Extraocular movements intact.      Pupils: Pupils are equal, round, and reactive to light.   Cardiovascular:      Rate and Rhythm: Normal rate and regular rhythm.   Pulmonary:      Effort: Pulmonary effort is normal.      Breath sounds: Normal breath sounds.   Abdominal:      General: Bowel sounds are normal.      Palpations: Abdomen is soft.   Musculoskeletal:      Cervical back: Normal range of motion.   Skin:     General: Skin is warm and dry.   Neurological:      General: No focal deficit present.      Mental Status: She is alert and oriented to person, place, and time.   Psychiatric:         Mood and Affect: Mood normal.         Speech: Speech normal.         Behavior: Behavior normal.          Jose Lopez MD  [facility]

## 2024-02-01 DIAGNOSIS — E13.9 LADA (LATENT AUTOIMMUNE DIABETES IN ADULTS), MANAGED AS TYPE 1 (HCC): Primary | ICD-10-CM

## 2024-02-01 DIAGNOSIS — E10.65 TYPE 1 DIABETES MELLITUS WITH HYPERGLYCEMIA (HCC): ICD-10-CM

## 2024-02-01 RX ORDER — INSULIN LISPRO-AABC 100 [IU]/ML
INJECTION, SOLUTION INTRAVENOUS; SUBCUTANEOUS
Qty: 90 ML | Refills: 3 | Status: SHIPPED | OUTPATIENT
Start: 2024-02-01

## 2024-03-01 DIAGNOSIS — E10.65 TYPE 1 DIABETES MELLITUS WITH HYPERGLYCEMIA (HCC): ICD-10-CM

## 2024-03-01 RX ORDER — INSULIN PMP CART,AUT,G6/7,CNTR
EACH SUBCUTANEOUS
Qty: 10 EACH | Refills: 6 | Status: SHIPPED | OUTPATIENT
Start: 2024-03-01

## 2024-04-15 DIAGNOSIS — E10.65 TYPE 1 DIABETES MELLITUS WITH HYPERGLYCEMIA (HCC): ICD-10-CM

## 2024-04-15 RX ORDER — PROCHLORPERAZINE 25 MG/1
SUPPOSITORY RECTAL
Qty: 1 EACH | Refills: 0 | Status: SHIPPED | OUTPATIENT
Start: 2024-04-15

## 2024-04-15 RX ORDER — PROCHLORPERAZINE 25 MG/1
SUPPOSITORY RECTAL
Qty: 9 EACH | Refills: 1 | Status: SHIPPED | OUTPATIENT
Start: 2024-04-15

## 2024-04-15 NOTE — TELEPHONE ENCOUNTER
Reason for call:   [x] Refill   [] Prior Auth  [] Other:     Office:   [] PCP/Provider -   [x] Specialty/Provider - endo    Medication:     Does the patient have enough for 3 days?   [x] Yes   [] No - Send as HP to POD

## 2024-04-20 ENCOUNTER — TELEPHONE (OUTPATIENT)
Age: 44
End: 2024-04-20

## 2024-04-20 NOTE — TELEPHONE ENCOUNTER
PA for Dexcom G6 Transmitter      Reason  Not Required            Message sent to provider pool yes     independent

## 2024-04-20 NOTE — TELEPHONE ENCOUNTER
PA for Dexcom G6 Sensor not required     Reason Not Required            Message sent to provider pool yes

## 2024-04-22 ENCOUNTER — TELEPHONE (OUTPATIENT)
Age: 44
End: 2024-04-22

## 2024-04-22 NOTE — TELEPHONE ENCOUNTER
Pt called in wanting to reschedule her appt. Was told by office staff that Dr. Harden will not be in office that day. There are no available appts until October. Would there be a possibility of overbooking? Pt has not seen Dr. Harden since 01/10/2024

## 2024-04-23 ENCOUNTER — TELEPHONE (OUTPATIENT)
Dept: ENDOCRINOLOGY | Facility: CLINIC | Age: 44
End: 2024-04-23

## 2024-04-23 NOTE — TELEPHONE ENCOUNTER
Called Jody and left voicemail with options Dr. Harden has offered. She can see her in the [place] office Friday the 26th of April at 9:00 or 10:00. If Jody accepts the appointment please advise.

## 2024-04-23 NOTE — TELEPHONE ENCOUNTER
The pt called back and will take the 9am appt on Friday, April 26, 2024 at the [place] location. Address was given to the pt who verbalized understanding. Spoke with Holly who will have a manager override the appt time as I was not able to schedule for the pt.

## 2024-04-26 ENCOUNTER — OFFICE VISIT (OUTPATIENT)
Dept: ENDOCRINOLOGY | Facility: CLINIC | Age: 44
End: 2024-04-26
Payer: COMMERCIAL

## 2024-04-26 VITALS
BODY MASS INDEX: 30.25 KG/M2 | HEART RATE: 89 BPM | HEIGHT: 66 IN | DIASTOLIC BLOOD PRESSURE: 80 MMHG | SYSTOLIC BLOOD PRESSURE: 116 MMHG | OXYGEN SATURATION: 98 % | WEIGHT: 188.2 LBS

## 2024-04-26 DIAGNOSIS — Z78.9 TRANSGENDER: ICD-10-CM

## 2024-04-26 DIAGNOSIS — Z96.41 INSULIN PUMP IN PLACE: ICD-10-CM

## 2024-04-26 DIAGNOSIS — E55.9 VITAMIN D DEFICIENCY: Primary | ICD-10-CM

## 2024-04-26 DIAGNOSIS — F64.9 GENDER DYSPHORIA: ICD-10-CM

## 2024-04-26 DIAGNOSIS — E10.3293 MILD NONPROLIFERATIVE DIABETIC RETINOPATHY OF BOTH EYES WITHOUT MACULAR EDEMA ASSOCIATED WITH TYPE 1 DIABETES MELLITUS (HCC): ICD-10-CM

## 2024-04-26 DIAGNOSIS — E13.9 LADA (LATENT AUTOIMMUNE DIABETES IN ADULTS), MANAGED AS TYPE 1 (HCC): ICD-10-CM

## 2024-04-26 DIAGNOSIS — E10.65 TYPE 1 DIABETES MELLITUS WITH HYPERGLYCEMIA (HCC): ICD-10-CM

## 2024-04-26 DIAGNOSIS — E16.2 HYPOGLYCEMIA: ICD-10-CM

## 2024-04-26 PROCEDURE — 99214 OFFICE O/P EST MOD 30 MIN: CPT | Performed by: INTERNAL MEDICINE

## 2024-04-26 PROCEDURE — 95251 CONT GLUC MNTR ANALYSIS I&R: CPT | Performed by: INTERNAL MEDICINE

## 2024-04-26 NOTE — PROGRESS NOTES
Jody Pascal 43 y.o. adult MRN: [id number]    Encounter: [id number]      Assessment/Plan     1 diabetes mellitus on long-term insulin therapy with hypo and hyperglycemia  Insulin pump in place  Retinopathy  Pump settings chnaged as below  Time Rate   12 am  0.9    6 am   0.95 -->0.9 units/hr       Time Insulin:Carb Ratio Insulin Sensitivity Factor   12A  10   50    7 am   8     10 pm  10  -->9.5            Plan on review of blood sugars in 2 weeks  Advised to avoid high carbohydrate containing snacks late in the evening  Labs to be done now and in 3 months prior to follow-up    4.  Trans woman on gender affirming hormone therapy  -Continue current medications  Check BMP to monitor potassium levels    5.  History of vitamin D deficiency-check vitamin D level    CC:  diabetes mellitus     History of Present Illness     HPI:  Jody Pascal is a 43 y.o. adult who is here for a follow-up of diabetes mellitus and gender affirming hormone therapy.    Last seen in 1/2024  Following up with ophthalmology for diabetic retinopathy; met with retina physician 1 month ago. Left eye continues to improve, right - stable; follow up in 6 months     Insulin pump-OmniPod with Dexcom    Pump settings  Time Rate   12 am  0.9    6 am   0.95      Time Insulin:Carb Ratio Insulin Sensitivity Factor   12A  10   50    7 am   8     10 pm  10                   CGM interpretation  Dexcom  Time percentage CGM worn 93 %   Time in range 54 (goal >65-70%)  High 31%  Very high 14%  Low <1%  Very low 0%  SD 65 (goal <50)     Average glucose 186 mg/dL  GMI 7.8%       Trans woman  Using estradiol 2 mg twice daily, spironolactone 100 mg orally twice a day  Met with Dr Wiggins regarding voice feminization options in 3/2024, scheduled for surgery on July 7/5    Hair growth has slowed down   Geeting laser hair removal done as well  Breast development more in the last 6 months   mood has been good, st times emotional     All other systems were reviewed and  are negative.       Review of Systems    Historical Information   Past Medical History:   Diagnosis Date    Allergic     Diabetes mellitus (HCC) 10/25/2019    Former smoker      No past surgical history on file.  Social History   Social History     Substance and Sexual Activity   Alcohol Use Yes    Comment: social drinker     Social History     Substance and Sexual Activity   Drug Use No     Social History     Tobacco Use   Smoking Status Former    Current packs/day: 0.00    Average packs/day: 0.5 packs/day for 20.2 years (10.1 ttl pk-yrs)    Types: Cigarettes    Start date: 1998    Quit date: 11/15/2018    Years since quittin.4   Smokeless Tobacco Never     Family History:   Family History   Problem Relation Age of Onset    Hypertension Mother     Diabetes Mother     Diabetes Father     Dementia Father     Substance Abuse Brother        Meds/Allergies   Current Outpatient Medications   Medication Sig Dispense Refill    BD Pen Needle Kaykay 2nd Gen 32G X 4 MM MISC USE TO INJECT INSULIN 4 TIMES A  each 0    Continuous Blood Gluc  (Dexcom G6 ) ANNABEL Use as directed for continuous glucose Monitoring 1 each 0    Continuous Blood Gluc Sensor (Dexcom G6 Sensor) MISC Use as directed for continuous glucose monitoring- change every 10 days 9 each 1    Continuous Blood Gluc Transmit (Dexcom G6 Transmitter) MISC Use as directed for continuous glucose monitoring- change every 3 months 1 each 0    CVS Glucose 4-6 GM-MG CHEW 4 TABLETS (16 G TOTAL) AS NEEDED FOR LOW BLOOD SUGAR      ergocalciferol (VITAMIN D2) 50,000 units Take 1 capsule (50,000 Units total) by mouth once a week for 12 doses (Patient not taking: Reported on 2023) 12 capsule 0    estradiol (ESTRACE) 2 MG tablet Take 1 tablet (2 mg total) by mouth 2 (two) times a day 180 tablet 3    fluticasone (FLONASE) 50 mcg/act nasal spray SPRAY 2 SPRAYS INTO EACH NOSTRIL EVERY DAY 48 mL 4    glucagon (GLUCAGON EMERGENCY) 1 MG injection Inject 1  mg under the skin once as needed for low blood sugar for up to 1 dose 1 kit 0    glucose 4 g chewable tablet Chew 4 tablets (16 g total) as needed for low blood sugar (Patient not taking: Reported on 6/17/2021) 100 tablet 3    InPen 100-Blue-Milagros ANNABEL USE INPEN DEVICE TO ADMINISTER HUMALOG INSULIN (Patient not taking: Reported on 6/8/2023) 1 each 0    insulin degludec (Tresiba FlexTouch) 100 units/mL injection pen Inject 20 Units under the skin daily (Patient not taking: Reported on 9/8/2023) 15 mL 3    Insulin Disposable Pump (Omnipod 5 G6 Pods, Gen 5,) MISC 1 cartridge every 72 hours sc 10 each 6    insulin lispro (HumaLOG KwikPen) 100 units/mL injection pen Inject 4-10 Units under the skin 3 (three) times a day with meals (Patient not taking: Reported on 9/8/2023) 15 mL 3    Insulin Lispro-aabc (Lyumjev) 100 UNIT/ML SOLN Inject 300 units in pump every 3 days 90 mL 3    Needles & Syringes MISC Use 3 (three) times a day If needed separately from formulary insulin pens 1 each 4    spironolactone (ALDACTONE) 100 mg tablet Take 1 tablet (100 mg total) by mouth 2 (two) times a day 180 tablet 2    Tresiba FlexTouch 100 units/mL injection pen INJECT 30 UNITS UNDER THE SKIN EVERY MORNING (Patient not taking: Reported on 6/8/2023) 30 mL 3    vardenafil (LEVITRA) 20 MG tablet Take 1 tablet (20 mg total) by mouth daily as needed for erectile dysfunction (Patient not taking: Reported on 9/8/2023) 10 tablet 0     No current facility-administered medications for this visit.     Allergies   Allergen Reactions    Other Allergic Rhinitis     SEASONAL ALLERGIES AND PET DANDER       Objective   Vitals: There were no vitals taken for this visit.    Physical Exam  Constitutional:       General: She is not in acute distress.     Appearance: She is well-developed. She is not diaphoretic.   HENT:      Head: Normocephalic and atraumatic.   Eyes:      Conjunctiva/sclera: Conjunctivae normal.   Cardiovascular:      Rate and Rhythm: Normal  "rate and regular rhythm.      Heart sounds: Normal heart sounds. No murmur heard.  Pulmonary:      Effort: Pulmonary effort is normal. No respiratory distress.      Breath sounds: Normal breath sounds. No wheezing.   Abdominal:      General: There is no distension.      Palpations: Abdomen is soft.      Tenderness: There is no abdominal tenderness. There is no guarding.   Musculoskeletal:      Cervical back: Normal range of motion and neck supple.   Skin:     General: Skin is warm and dry.      Findings: No erythema or rash.   Neurological:      Mental Status: She is alert and oriented to person, place, and time.   Psychiatric:         Behavior: Behavior normal.         Thought Content: Thought content normal.         The history was obtained from the review of the chart, patient.    Lab Results:   Lab Results   Component Value Date/Time    TSH 3RD GENERATON 1.640 05/12/2023 09:23 AM    Free T4 0.99 05/12/2023 09:23 AM     Lab Results   Component Value Date    WBC 8.23 01/05/2023    HGB 12.9 01/05/2023    HCT 40.2 01/05/2023    MCV 94 01/05/2023     01/05/2023     Lab Results   Component Value Date    CREATININE 0.58 (L) 01/11/2024    BUN 13 01/11/2024    K 4.2 01/11/2024     01/11/2024    CO2 25 01/11/2024     Lab Results   Component Value Date    HGBA1C 7.5 01/10/2024         Imaging Studies:       I have personally reviewed pertinent reports.      Portions of the record may have been created with voice recognition software. Occasional wrong word or \"sound a like\" substitutions may have occurred due to the inherent limitations of voice recognition software. Read the chart carefully and recognize, using context, where substitutions have occurred.    "

## 2024-04-26 NOTE — PATIENT INSTRUCTIONS
Please get labs done as ordered  Plan for pump download in 2 weeks  Follow-up in 3-4 months with repeat labs

## 2024-04-27 PROBLEM — R06.83 SNORING: Status: ACTIVE | Noted: 2024-04-27

## 2024-07-01 ENCOUNTER — APPOINTMENT (OUTPATIENT)
Dept: LAB | Facility: CLINIC | Age: 44
End: 2024-07-01
Payer: COMMERCIAL

## 2024-07-01 ENCOUNTER — ANESTHESIA EVENT (OUTPATIENT)
Dept: PERIOP | Facility: HOSPITAL | Age: 44
End: 2024-07-01
Payer: COMMERCIAL

## 2024-07-01 ENCOUNTER — APPOINTMENT (OUTPATIENT)
Dept: LAB | Facility: HOSPITAL | Age: 44
End: 2024-07-01
Payer: COMMERCIAL

## 2024-07-01 DIAGNOSIS — E55.9 VITAMIN D DEFICIENCY: ICD-10-CM

## 2024-07-01 DIAGNOSIS — R49.0 DYSPHONIA: ICD-10-CM

## 2024-07-01 DIAGNOSIS — Z78.9 TRANSGENDER: ICD-10-CM

## 2024-07-01 DIAGNOSIS — Z96.41 INSULIN PUMP IN PLACE: ICD-10-CM

## 2024-07-01 DIAGNOSIS — E10.65 TYPE 1 DIABETES MELLITUS WITH HYPERGLYCEMIA (HCC): ICD-10-CM

## 2024-07-01 DIAGNOSIS — E13.9 LADA (LATENT AUTOIMMUNE DIABETES IN ADULTS), MANAGED AS TYPE 1 (HCC): ICD-10-CM

## 2024-07-01 DIAGNOSIS — E10.69 TYPE 1 DIABETES MELLITUS WITH OTHER SPECIFIED COMPLICATION (HCC): ICD-10-CM

## 2024-07-01 DIAGNOSIS — F64.9 GENDER DYSPHORIA: ICD-10-CM

## 2024-07-01 DIAGNOSIS — E16.2 HYPOGLYCEMIA: ICD-10-CM

## 2024-07-01 DIAGNOSIS — E83.52 HYPERCALCEMIA: ICD-10-CM

## 2024-07-01 DIAGNOSIS — E10.3293 MILD NONPROLIFERATIVE DIABETIC RETINOPATHY OF BOTH EYES WITHOUT MACULAR EDEMA ASSOCIATED WITH TYPE 1 DIABETES MELLITUS (HCC): ICD-10-CM

## 2024-07-01 LAB
25(OH)D3 SERPL-MCNC: 67.6 NG/ML (ref 30–100)
ALBUMIN SERPL BCG-MCNC: 3.9 G/DL (ref 3.5–5)
ALP SERPL-CCNC: 41 U/L (ref 34–104)
ALT SERPL W P-5'-P-CCNC: 15 U/L (ref 7–52)
ANION GAP SERPL CALCULATED.3IONS-SCNC: 10 MMOL/L (ref 4–13)
AST SERPL W P-5'-P-CCNC: 18 U/L (ref 5–45)
BASOPHILS # BLD AUTO: 0.01 THOUSANDS/ÂΜL (ref 0–0.1)
BASOPHILS NFR BLD AUTO: 0 % (ref 0–1)
BILIRUB SERPL-MCNC: 0.29 MG/DL (ref 0.2–1)
BUN SERPL-MCNC: 8 MG/DL (ref 5–25)
CALCIUM SERPL-MCNC: 9.3 MG/DL (ref 8.4–10.2)
CHLORIDE SERPL-SCNC: 103 MMOL/L (ref 96–108)
CHOLEST SERPL-MCNC: 159 MG/DL
CO2 SERPL-SCNC: 24 MMOL/L (ref 21–32)
CREAT SERPL-MCNC: 0.74 MG/DL (ref 0.6–1.3)
EOSINOPHIL # BLD AUTO: 0.06 THOUSAND/ÂΜL (ref 0–0.61)
EOSINOPHIL NFR BLD AUTO: 1 % (ref 0–6)
ERYTHROCYTE [DISTWIDTH] IN BLOOD BY AUTOMATED COUNT: 12.3 % (ref 11.6–15.1)
EST. AVERAGE GLUCOSE BLD GHB EST-MCNC: 180 MG/DL
ESTRADIOL SERPL-MCNC: 240.9 PG/ML
GLUCOSE P FAST SERPL-MCNC: 167 MG/DL (ref 65–99)
HBA1C MFR BLD: 7.9 %
HCT VFR BLD AUTO: 39.6 % (ref 36.5–46.1)
HDLC SERPL-MCNC: 55 MG/DL
HGB BLD-MCNC: 13 G/DL (ref 12–15.4)
IMM GRANULOCYTES # BLD AUTO: 0.02 THOUSAND/UL (ref 0–0.2)
IMM GRANULOCYTES NFR BLD AUTO: 0 % (ref 0–2)
LDLC SERPL CALC-MCNC: 74 MG/DL (ref 0–100)
LYMPHOCYTES # BLD AUTO: 1.1 THOUSANDS/ÂΜL (ref 0.6–4.47)
LYMPHOCYTES NFR BLD AUTO: 18 % (ref 14–44)
MCH RBC QN AUTO: 30.6 PG (ref 26.8–34.3)
MCHC RBC AUTO-ENTMCNC: 32.8 G/DL (ref 31.4–37.4)
MCV RBC AUTO: 93 FL (ref 82–98)
MONOCYTES # BLD AUTO: 0.69 THOUSAND/ÂΜL (ref 0.17–1.22)
MONOCYTES NFR BLD AUTO: 11 % (ref 4–12)
NEUTROPHILS # BLD AUTO: 4.4 THOUSANDS/ÂΜL (ref 1.85–7.62)
NEUTS SEG NFR BLD AUTO: 70 % (ref 43–75)
NONHDLC SERPL-MCNC: 104 MG/DL
NRBC BLD AUTO-RTO: 0 /100 WBCS
PLATELET # BLD AUTO: 326 THOUSANDS/UL (ref 149–390)
PMV BLD AUTO: 10.9 FL (ref 8.9–12.7)
POTASSIUM SERPL-SCNC: 4.5 MMOL/L (ref 3.5–5.3)
PROT SERPL-MCNC: 7.1 G/DL (ref 6.4–8.4)
RBC # BLD AUTO: 4.25 MILLION/UL (ref 3.88–5.12)
SODIUM SERPL-SCNC: 137 MMOL/L (ref 135–147)
TRIGL SERPL-MCNC: 149 MG/DL
WBC # BLD AUTO: 6.28 THOUSAND/UL (ref 4.31–10.16)

## 2024-07-01 PROCEDURE — 82670 ASSAY OF TOTAL ESTRADIOL: CPT

## 2024-07-01 PROCEDURE — 83036 HEMOGLOBIN GLYCOSYLATED A1C: CPT

## 2024-07-01 PROCEDURE — 80053 COMPREHEN METABOLIC PANEL: CPT

## 2024-07-01 PROCEDURE — 82306 VITAMIN D 25 HYDROXY: CPT

## 2024-07-01 PROCEDURE — 84403 ASSAY OF TOTAL TESTOSTERONE: CPT

## 2024-07-01 PROCEDURE — 80061 LIPID PANEL: CPT

## 2024-07-01 PROCEDURE — 85025 COMPLETE CBC W/AUTO DIFF WBC: CPT

## 2024-07-01 PROCEDURE — 84402 ASSAY OF FREE TESTOSTERONE: CPT

## 2024-07-01 PROCEDURE — 36415 COLL VENOUS BLD VENIPUNCTURE: CPT

## 2024-07-02 DIAGNOSIS — Z78.9 TRANSGENDER: ICD-10-CM

## 2024-07-02 DIAGNOSIS — E10.65 TYPE 1 DIABETES MELLITUS WITH HYPERGLYCEMIA (HCC): ICD-10-CM

## 2024-07-02 DIAGNOSIS — E13.9 LADA (LATENT AUTOIMMUNE DIABETES IN ADULTS), MANAGED AS TYPE 1 (HCC): Primary | ICD-10-CM

## 2024-07-02 LAB
TESTOST FREE SERPL-MCNC: 0.9 PG/ML (ref 6.8–21.5)
TESTOST SERPL-MCNC: 8 NG/DL (ref 264–916)

## 2024-07-05 ENCOUNTER — ANESTHESIA (OUTPATIENT)
Dept: PERIOP | Facility: HOSPITAL | Age: 44
End: 2024-07-05
Payer: COMMERCIAL

## 2024-07-05 ENCOUNTER — HOSPITAL ENCOUNTER (OUTPATIENT)
Facility: HOSPITAL | Age: 44
Setting detail: OUTPATIENT SURGERY
Discharge: HOME | End: 2024-07-05
Attending: OTOLARYNGOLOGY | Admitting: OTOLARYNGOLOGY
Payer: COMMERCIAL

## 2024-07-05 VITALS
HEART RATE: 87 BPM | HEIGHT: 66 IN | OXYGEN SATURATION: 96 % | RESPIRATION RATE: 16 BRPM | DIASTOLIC BLOOD PRESSURE: 70 MMHG | WEIGHT: 180 LBS | BODY MASS INDEX: 28.93 KG/M2 | TEMPERATURE: 97 F | SYSTOLIC BLOOD PRESSURE: 121 MMHG

## 2024-07-05 LAB
GLUCOSE SERPL-MCNC: 198 MG/DL (ref 65–140)
GLUCOSE SERPL-MCNC: 234 MG/DL (ref 65–140)

## 2024-07-05 PROCEDURE — L8607 INJ VOCAL CORD BULKING AGENT: HCPCS | Performed by: OTOLARYNGOLOGY

## 2024-07-05 PROCEDURE — 82948 REAGENT STRIP/BLOOD GLUCOSE: CPT

## 2024-07-05 RX ORDER — ONDANSETRON 2 MG/ML
INJECTION INTRAMUSCULAR; INTRAVENOUS AS NEEDED
Status: DISCONTINUED | OUTPATIENT
Start: 2024-07-05 | End: 2024-07-05

## 2024-07-05 RX ORDER — ONDANSETRON 2 MG/ML
4 INJECTION INTRAMUSCULAR; INTRAVENOUS ONCE
Status: DISCONTINUED | OUTPATIENT
Start: 2024-07-05 | End: 2024-07-05 | Stop reason: HOSPADM

## 2024-07-05 RX ORDER — HYDROMORPHONE HCL/PF 1 MG/ML
SYRINGE (ML) INJECTION AS NEEDED
Status: DISCONTINUED | OUTPATIENT
Start: 2024-07-05 | End: 2024-07-05

## 2024-07-05 RX ORDER — FENTANYL CITRATE/PF 50 MCG/ML
50 SYRINGE (ML) INJECTION
Status: DISCONTINUED | OUTPATIENT
Start: 2024-07-05 | End: 2024-07-05 | Stop reason: HOSPADM

## 2024-07-05 RX ORDER — SODIUM CHLORIDE, SODIUM LACTATE, POTASSIUM CHLORIDE, CALCIUM CHLORIDE 600; 310; 30; 20 MG/100ML; MG/100ML; MG/100ML; MG/100ML
125 INJECTION, SOLUTION INTRAVENOUS CONTINUOUS
Status: DISCONTINUED | OUTPATIENT
Start: 2024-07-05 | End: 2024-07-05 | Stop reason: HOSPADM

## 2024-07-05 RX ORDER — DEXAMETHASONE SODIUM PHOSPHATE 10 MG/ML
INJECTION, SOLUTION INTRAMUSCULAR; INTRAVENOUS AS NEEDED
Status: DISCONTINUED | OUTPATIENT
Start: 2024-07-05 | End: 2024-07-05

## 2024-07-05 RX ORDER — MIDAZOLAM HYDROCHLORIDE 2 MG/2ML
INJECTION, SOLUTION INTRAMUSCULAR; INTRAVENOUS AS NEEDED
Status: DISCONTINUED | OUTPATIENT
Start: 2024-07-05 | End: 2024-07-05

## 2024-07-05 RX ORDER — FENTANYL CITRATE 50 UG/ML
INJECTION, SOLUTION INTRAMUSCULAR; INTRAVENOUS AS NEEDED
Status: DISCONTINUED | OUTPATIENT
Start: 2024-07-05 | End: 2024-07-05

## 2024-07-05 RX ORDER — LIDOCAINE HYDROCHLORIDE 20 MG/ML
INJECTION, SOLUTION EPIDURAL; INFILTRATION; INTRACAUDAL; PERINEURAL AS NEEDED
Status: DISCONTINUED | OUTPATIENT
Start: 2024-07-05 | End: 2024-07-05 | Stop reason: HOSPADM

## 2024-07-05 RX ORDER — PROPOFOL 10 MG/ML
INJECTION, EMULSION INTRAVENOUS AS NEEDED
Status: DISCONTINUED | OUTPATIENT
Start: 2024-07-05 | End: 2024-07-05

## 2024-07-05 RX ORDER — ROCURONIUM BROMIDE 10 MG/ML
INJECTION, SOLUTION INTRAVENOUS AS NEEDED
Status: DISCONTINUED | OUTPATIENT
Start: 2024-07-05 | End: 2024-07-05

## 2024-07-05 RX ORDER — LIDOCAINE HYDROCHLORIDE 10 MG/ML
0.5 INJECTION, SOLUTION EPIDURAL; INFILTRATION; INTRACAUDAL; PERINEURAL ONCE AS NEEDED
Status: DISCONTINUED | OUTPATIENT
Start: 2024-07-05 | End: 2024-07-05 | Stop reason: HOSPADM

## 2024-07-05 RX ORDER — ACETAMINOPHEN 325 MG/1
650 TABLET ORAL EVERY 6 HOURS PRN
Status: DISCONTINUED | OUTPATIENT
Start: 2024-07-05 | End: 2024-07-05 | Stop reason: HOSPADM

## 2024-07-05 RX ORDER — IBUPROFEN 400 MG/1
600 TABLET ORAL EVERY 6 HOURS PRN
Status: DISCONTINUED | OUTPATIENT
Start: 2024-07-05 | End: 2024-07-05 | Stop reason: HOSPADM

## 2024-07-05 RX ORDER — ONDANSETRON 2 MG/ML
4 INJECTION INTRAMUSCULAR; INTRAVENOUS ONCE AS NEEDED
Status: DISCONTINUED | OUTPATIENT
Start: 2024-07-05 | End: 2024-07-05 | Stop reason: HOSPADM

## 2024-07-05 RX ORDER — OXYMETAZOLINE HYDROCHLORIDE 0.05 G/100ML
SPRAY NASAL AS NEEDED
Status: DISCONTINUED | OUTPATIENT
Start: 2024-07-05 | End: 2024-07-05 | Stop reason: HOSPADM

## 2024-07-05 RX ADMIN — ROCURONIUM BROMIDE 10 MG: 10 INJECTION, SOLUTION INTRAVENOUS at 09:02

## 2024-07-05 RX ADMIN — Medication 0.5 MG: at 10:22

## 2024-07-05 RX ADMIN — ROCURONIUM BROMIDE 20 MG: 10 INJECTION, SOLUTION INTRAVENOUS at 08:36

## 2024-07-05 RX ADMIN — DEXAMETHASONE SODIUM PHOSPHATE 10 MG: 10 INJECTION, SOLUTION INTRAMUSCULAR; INTRAVENOUS at 08:04

## 2024-07-05 RX ADMIN — PROPOFOL 200 MG: 10 INJECTION, EMULSION INTRAVENOUS at 08:04

## 2024-07-05 RX ADMIN — FENTANYL CITRATE 100 MCG: 50 INJECTION, SOLUTION INTRAMUSCULAR; INTRAVENOUS at 08:04

## 2024-07-05 RX ADMIN — MIDAZOLAM HYDROCHLORIDE 2 MG: 2 INJECTION, SOLUTION INTRAMUSCULAR; INTRAVENOUS at 07:58

## 2024-07-05 RX ADMIN — ROCURONIUM BROMIDE 10 MG: 10 INJECTION, SOLUTION INTRAVENOUS at 09:33

## 2024-07-05 RX ADMIN — PROPOFOL 50 MG: 10 INJECTION, EMULSION INTRAVENOUS at 08:08

## 2024-07-05 RX ADMIN — SODIUM CHLORIDE, SODIUM LACTATE, POTASSIUM CHLORIDE, AND CALCIUM CHLORIDE: .6; .31; .03; .02 INJECTION, SOLUTION INTRAVENOUS at 07:55

## 2024-07-05 RX ADMIN — ROCURONIUM BROMIDE 50 MG: 10 INJECTION, SOLUTION INTRAVENOUS at 08:04

## 2024-07-05 RX ADMIN — ONDANSETRON 4 MG: 2 INJECTION INTRAMUSCULAR; INTRAVENOUS at 10:16

## 2024-07-05 NOTE — ANESTHESIA PREPROCEDURE EVALUATION
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         Additional Monitors:     Airway Plan: ETT.    Comment: Risks of general anesthesia discussed including likely possibility of PONV and sore throat, as well as the rare possibilities of aspiration, dental/oropharyngeal/ocular injuries, or grave/life threatening anesthetic and surgical emergencies..       Plan Factors-Exercise tolerance (METS): >4 METS.    Chart reviewed.    Patient summary reviewed.      Patient instructed to abstain from smoking on day of procedure. Patient did not smoke on day of surgery.            Induction- intravenous.    Postoperative Plan- Plan for postoperative opioid use. Planned trial extubation        Informed Consent- Anesthetic plan and risks discussed with patient.  I personally reviewed this patient with the CRNA. Discussed and agreed on the Anesthesia Plan with the CRNA..

## 2024-07-05 NOTE — INTERIM OP NOTE
INTERIM OP NOTE    PATIENT NAME: Jody Pascal  : 1980  MRN: [id number]  [facility] OR ROOM 05    Surgery Date: 2024    Preop Diagnosis:  Dysphonia [R49.0]  Transgender [Z78.9]  Gender dysphoria [F64.9]    Post-Op Diagnosis Codes:     * Dysphonia [R49.0]     * Transgender [Z78.9]     * Gender dysphoria [F64.9]    PROCEDURE:  MICRO DIRECT LARYNGOSCOPY  GLOTTOPLASTY   BILATERAL VOCAL FOLD INJECTION    Surgeons and Role:     * Graham Wiggins MD - Primary     * Hay Beck MD    Specimens:  * No specimens in log *    Estimated Blood Loss:   Minimal    Anesthesia Type:   General     Findings:   No overt pathology identified in the larynx.  Cristóbal anterior-commissure created after bilateral anterior de-epithelialization, two interrupted 2-0 Vicryl sutures.   Bilateral vocal fold injection with Prolaryn Gel.    Complications:   None      SIGNATURE: Hay Beck MD   DATE: 2024   TIME: 11:00 AM

## 2024-07-05 NOTE — DISCHARGE INSTR - AVS FIRST PAGE
Absolute voice rest x 1 week  No talking, shouting, whispering  Avoid straining, heavy lifting, cough, throat clearing    If cough, use OTC cough suppressant or hydrocodone medication    Resume previous diet    PLEASE DO NOT USE CPAP UNTIL YOU ARE SEEN BY DR. WIGGINS.    Follow up with Dr. Wiggins in 1 week

## 2024-07-05 NOTE — H&P
Surgery Pre-op note/Updated History and Physical    Date of service: 7/5/20247:24 AM      No changes from most recent clinic H&P note. Patient to OR for MDL, glottoplasty, vf injection.      Pmh: Reviewed  Pshx: Reviewed  Social history: Reviewed  Medications: Reviewed  ROS: as above      Vitals:    07/05/24 0703   BP: 124/88   Pulse: 83   Resp: 16   Temp: (!) 97 °F (36.1 °C)   SpO2: 99%       ENT: oral cavity patent  Chest/Heart/Lungs: Breathing, perfused, unremarkable  Abd: Unremarkable  Ext: Unremarkable        The procedure was discussed with the patient, including risks, benefits, and alternatives and all questions were answered. Consent signed and in the chart.    Hay Beck MD PGY-4  [facility] Otolaryngology - Head and Neck Surgery  Available on Epic Secure Chat.  Please contact ENT Resident Epic Secure chat role for any questions or concerns.    7/5/2024 7:24 AM

## 2024-07-05 NOTE — ANESTHESIA POSTPROCEDURE EVALUATION
Post-Op Assessment Note    CV Status:  Stable  Pain Score: 0    Pain management: adequate       Mental Status:  Sleepy   Hydration Status:  Euvolemic   PONV Controlled:  Controlled   Airway Patency:  Patent     Post Op Vitals Reviewed: Yes    No anethesia notable event occurred.    Staff: Anesthesiologist, CRNA               /86 (07/05/24 1030)    Temp (!) 96.5 °F (35.8 °C) (07/05/24 1032)    Pulse 62 (07/05/24 1032)   Resp 20 (07/05/24 1030)    SpO2 99 % (07/05/24 1030)

## 2024-07-15 NOTE — OP NOTE
OPERATIVE REPORT  PATIENT NAME: Jody Pascal    :  1980  MRN: [id number]  Pt Location:  OR ROOM 05    SURGERY DATE: 2024    Surgeons and Role:     * Graham Wiggins MD - Primary     * Hay Beck MD    Preop Diagnosis:  Dysphonia [R49.0]  Transgender [Z78.9]  Gender dysphoria [F64.9]    Post-Op Diagnosis Codes:     * Dysphonia [R49.0]     * Transgender [Z78.9]     * Gender dysphoria [F64.9]    Procedure(s):  MICRO DIRECT LARYNGOSCOPY  GLOTTOPLASTY   BILATERAL VOCAL FOLD INJECTION    Specimen(s):  None    Estimated Blood Loss:   < 5 cc    Anesthesia Type:   General    Operative Indications:  Dysphonia [R49.0]  Transgender [Z78.9]  Gender dysphoria [F64.9]      Operative Findings:  No overt pathology identified in the larynx.  Cristóbal anterior-commissure created after bilateral anterior de-epithelialization, two interrupted 2-0 Vicryl sutures.   Bilateral vocal fold injection with Prolaryn Gel.      Complications:   None    Procedure and Technique:  After obtaining informed consent, patient was taken to the operating room by the anesthesia team.  Patient was placed in the supine position on the operating room table.  Time out was performed to confirm patient's name, ID, and procedure.      General endotracheal anesthesia was induced and size 5-0 ETT was used.  Patient was turned 90 degrees.  Eyes were protected with tape, upper teeth/gingiva were protected with tooth guard.  Medium female laryngoscope was used to obtain view of the larynx and was suspended.  Views were obtained with zero and 70 degree Luis sergio telescopes and microscope.    GLOTTOPLASTY WAS PERFORMED AS FOLLOWS:  The free edge of the left vocal fold was gasped with a forceps and was de-epithelialized using a combination of Sataloff sharp knife and micro scissors. Care was taken to leave approximately 2 mm of epithelium just posterior to the anterior commissure. A similar process was performed on the right side. The cristóbal-anterior  commissure was created by using two 2-0 Vicryl sutures in an interrupted fashion through each vocal fold at the level of the de-epithelialized portion.    BILATERAL VOCAL FOLD INJECTION WAS PERFORMED AS FOLLOWS:  The vocal fold was injected just lateral to the vocalis muscle on the left side.  Appropriate bulking and medialization were achieved.  Excess was suctioned. The same process was repeated on the right vocal fold.    Oxymetazoline soaked pledgets were used for hemostasis.    The vocal folds were sprayed with 2% lidocaine and excess suctioned.    The laryngoscope and tooth guard/gauze were then removed and care of patient was returned to anesthesia for extubation.  Patient was then taken to the postoperative anesthesia care unit for recovery.          Patient Disposition:  PACU         SIGNATURE: Hay Beck MD  DATE: July 15, 2024  TIME: 7:36 AM

## 2024-07-26 ENCOUNTER — TELEPHONE (OUTPATIENT)
Dept: ENDOCRINOLOGY | Facility: CLINIC | Age: 44
End: 2024-07-26

## 2024-07-26 NOTE — TELEPHONE ENCOUNTER
Called patient and left a voicemail in reference to providers schedule change. Replace with 10-4-2024 @ 8:40. If patient excepts please let us know. Otherwise please  schedule first available and wait list. Will review also for cancellations daily to accommodate. Please advise

## 2024-08-02 DIAGNOSIS — E10.65 TYPE 1 DIABETES MELLITUS WITH HYPERGLYCEMIA (HCC): ICD-10-CM

## 2024-08-02 RX ORDER — PROCHLORPERAZINE 25 MG/1
SUPPOSITORY RECTAL
Qty: 1 EACH | Refills: 0 | Status: SHIPPED | OUTPATIENT
Start: 2024-08-02

## 2024-08-02 NOTE — TELEPHONE ENCOUNTER
Reason for call:   [x] Refill   [] Prior Auth  [] Other:     Office:   [] PCP/Provider -   [x] Specialty/Provider - Endocrinology - Dr Harden     Medication: Continuous Blood Gluc Transmit (Dexcom G6 Transmitter) MISC     Dose/Frequency: Use as directed for continuous glucose monitoring- change every 3 months,     Quantity: 3    Pharmacy: [id number]    Does the patient have enough for 3 days?   [x] Yes   [] No - Send as HP to POD

## 2024-08-05 ENCOUNTER — PROCEDURE VISIT (OUTPATIENT)
Dept: DERMATOLOGY | Facility: CLINIC | Age: 44
End: 2024-08-05

## 2024-08-05 VITALS — HEIGHT: 66 IN | WEIGHT: 180 LBS | BODY MASS INDEX: 28.93 KG/M2 | TEMPERATURE: 97.3 F

## 2024-08-05 DIAGNOSIS — F64.9 GENDER DYSPHORIA: Primary | ICD-10-CM

## 2024-08-05 NOTE — PROGRESS NOTES
"[facility]at[facility] Clinic Note     Patient Name: Jody Pascal  Encounter Date: 08/05/2024     Have you been cared for by a [facility] Dermatologist in the last 3 years and, if so, which description applies to you?    Yes.  I have been here within the last 3 years, and my medical history has NOT changed since that time.  I am FEMALE/of non child-bearing potential.    REVIEW OF SYSTEMS:  Have you recently had or currently have any of the following? No changes in my recent health.   PAST MEDICAL HISTORY:  Have you personally ever had or currently have any of the following?  If \"YES,\" then please provide more detail. No changes in my medical history.   HISTORY OF IMMUNOSUPPRESSION: Do you have a history of any of the following:  Systemic Immunosuppression such as Diabetes, Biologic or Immunotherapy, Chemotherapy, Organ Transplantation, Bone Marrow Transplantation?  No     Answering \"YES\" requires the addition of the dotphrase \"IMMUNOSUPPRESSED\" as the first diagnosis of the patient's visit.   FAMILY HISTORY:  Any \"first degree relatives\" (parent, brother, sister, or child) with the following?    No changes in my family's known health.   PATIENT EXPERIENCE:    Do you want the Dermatologist to perform a COMPLETE skin exam today including a clinical examination under the \"bra and underwear\" areas?  NO  If necessary, do we have your permission to call and leave a detailed message on your Preferred Phone number that includes your specific medical information?  Yes      Allergies   Allergen Reactions    Other Allergic Rhinitis     SEASONAL ALLERGIES AND PET DANDER      Current Outpatient Medications:     [facility] Glucose 4-6 GM-MG, CHEW 4 TABLETS (16 G TOTAL) AS NEEDED FOR LOW BLOOD SUGAR, Disp: , Rfl:     estradiol (ESTRACE) 2 MG tablet, Take 1 tablet (2 mg total) by mouth 2 (two) times a day, Disp: 180 tablet, Rfl: 3    fluticasone (FLONASE) 50 mcg/act nasal spray, SPRAY 2 SPRAYS INTO EACH NOSTRIL EVERY DAY, Disp: 48 mL, " Rfl: 4    glucagon (GLUCAGON EMERGENCY) 1 MG injection, Inject 1 mg under the skin once as needed for low blood sugar for up to 1 dose, Disp: 1 kit, Rfl: 0    insulin degludec (Tresiba FlexTouch) 100 units/mL injection pen, Inject 20 Units under the skin daily, Disp: 15 mL, Rfl: 3    Insulin Disposable Pump (Omnipod 5 G6 Pods, Gen 5,) MISC, 1 cartridge every 72 hours sc, Disp: 10 each, Rfl: 6    spironolactone (ALDACTONE) 100 mg tablet, Take 1 tablet (100 mg total) by mouth 2 (two) times a day, Disp: 180 tablet, Rfl: 2    acetaminophen-codeine (TYLENOL/CODEINE #3) 300-30 MG per tablet, Take 1 tablet by mouth every 4 (four) hours as needed for moderate pain (or cough) (Patient not taking: Reported on 7/29/2024), Disp: 15 tablet, Rfl: 0    BD Pen Needle Kaykay 2nd Gen 32G X 4 MM MISC, USE TO INJECT INSULIN 4 TIMES A DAY, Disp: 400 each, Rfl: 0    Continuous Blood Gluc  (Dexcom G6 ) ANNABEL, Use as directed for continuous glucose Monitoring, Disp: 1 each, Rfl: 0    Continuous Blood Gluc Sensor (Dexcom G6 Sensor) MISC, Use as directed for continuous glucose monitoring- change every 10 days, Disp: 9 each, Rfl: 1    Continuous Glucose Transmitter (Dexcom G6 Transmitter) MISC, Use as directed for continuous glucose monitoring- change every 3 months, Disp: 1 each, Rfl: 0    ergocalciferol (VITAMIN D2) 50,000 units, Take 1 capsule (50,000 Units total) by mouth once a week for 12 doses (Patient not taking: Reported on 9/8/2023), Disp: 12 capsule, Rfl: 0    famotidine (PEPCID) 40 MG tablet, TAKE 1 TABLET BY MOUTH DAILY AT BEDTIME (Patient not taking: Reported on 8/5/2024), Disp: 90 tablet, Rfl: 4    glucose 4 g chewable tablet, Chew 4 tablets (16 g total) as needed for low blood sugar (Patient not taking: Reported on 6/17/2021), Disp: 100 tablet, Rfl: 3    InPen 926-[id number]-Dywfe ANNABEL, USE INPEN DEVICE TO ADMINISTER HUMALOG INSULIN (Patient not taking: Reported on 6/8/2023), Disp: 1 each, Rfl: 0    insulin lispro  (HumaLOG KwikPen) 100 units/mL injection pen, Inject 4-10 Units under the skin 3 (three) times a day with meals (Patient not taking: Reported on 9/8/2023), Disp: 15 mL, Rfl: 3    Insulin Lispro-aabc (Lyumjev) 100 UNIT/ML SOLN, Inject 300 units in pump every 3 days, Disp: 90 mL, Rfl: 3    Needles & Syringes MISC, Use 3 (three) times a day If needed separately from formulary insulin pens, Disp: 1 each, Rfl: 4    Tresiba FlexTouch 100 units/mL injection pen, INJECT 30 UNITS UNDER THE SKIN EVERY MORNING (Patient not taking: Reported on 6/8/2023), Disp: 30 mL, Rfl: 3    vardenafil (LEVITRA) 20 MG tablet, Take 1 tablet (20 mg total) by mouth daily as needed for erectile dysfunction, Disp: 10 tablet, Rfl: 0          Whom besides the patient is providing clinical information about today's encounter?   NO ADDITIONAL HISTORIAN (patient alone provided history)    Physical Exam and Assessment/Plan by Diagnosis:    Alexandrite Laser Treatment  8/5/2024    Device: Kalyn Gentle Max Pro  Place of Treatment: [facility]    Surgeon and :  Dr Michael  Assistant: Melina Carson. DCA    Treatment number: 3  Site of treatment: face and neck  Skin type: Lambert II    Pre-operative Diagnosis:   Indications for Surgery:    Post-operative Diagnosis: same as pre-operative    Anesthetic: Victoriano cooler     Safety Precautions:  Fire extinguisher persent/Window covered/Staff and patient eyes covered/Warning sign posted     Interim History/Comments:  none  Percent Improvement: N/A    Parameters: Laser was set to 755nm    Fluence: 11 J/cm2  Pulse duration: 3 msec  Spot size: 20 mm  Pulse count: 174     Procedure Note:   After discussing potential procedure related risks including but not limited to pain, purpura, blistering, scarring, discoloration, “footprinting,” recurrence, inefficacy, need for additional treatment, and undesirable cosmetic results, written and verbal consent were obtained.  Patient was brought back to  the operating room. Time out was performed.  Patient's name, identification and intended procedure were verified. Prior to the procedure, the patient cleansed the treatment area. The treatment area was re-cleansed with alcohol. Eye coverings eye shield inserted/placed.     The cooling device was set to 40 msec msec pre / 40 msec delay/      Tolerance: Patient tolerated the procedure well with no immediate significant complications and left in stable condition.   Complications: none  Other procedures: none  Photography: no    Post-op Care: Aftercare was discussed. Continue to ice at home and keep the area moist with a gentle moisturizer. Advised the patient to avoid exercise for the next 24 hours and also to be cautious with sun exposure over the next week. Advised patient to call the office if there is excessive swelling.   Follow-up:  Patient is aware that it will take multiple treatments to treat this condition. Return to clinic for re-treatment in 4 weeks.     BILL INSURANCE FOR CPT CODES 04537, 56499.      Scribe Attestation      I,:  Melina Carson MA am acting as a scribe while in the presence of the attending physician.:       I,:  Soren Michael MD personally performed the services described in this documentation    as scribed in my presence.:

## 2024-08-13 ENCOUNTER — TELEPHONE (OUTPATIENT)
Dept: ENDOCRINOLOGY | Facility: CLINIC | Age: 44
End: 2024-08-13

## 2024-08-13 NOTE — TELEPHONE ENCOUNTER
Spoke to Jody and she accepted the change in schedule. 8- @ 9:33am. She was a reschedule from Dr. Harden 10- to 10-4-2024.

## 2024-09-03 ENCOUNTER — PROCEDURE VISIT (OUTPATIENT)
Dept: DERMATOLOGY | Facility: CLINIC | Age: 44
End: 2024-09-03
Payer: COMMERCIAL

## 2024-09-03 DIAGNOSIS — Z78.9 TRANSGENDER: ICD-10-CM

## 2024-09-03 DIAGNOSIS — F64.9 GENDER DYSPHORIA: Primary | ICD-10-CM

## 2024-09-03 PROCEDURE — 17999 UNLISTD PX SKN MUC MEMB SUBQ: CPT | Performed by: DERMATOLOGY

## 2024-09-03 NOTE — PROGRESS NOTES
"/[facility] Dermatology Clinic Note     Patient Name: Jody Pascal  Encounter Date: 09/03/24     Have you been cared for by a [facility] Dermatologist in the last 3 years and, if so, which description applies to you?    Yes.  I have been here within the last 3 years, and my medical history has NOT changed since that time.  I am FEMALE/of child-bearing potential.    REVIEW OF SYSTEMS:  Have you recently had or currently have any of the following? No changes in my recent health.   PAST MEDICAL HISTORY:  Have you personally ever had or currently have any of the following?  If \"YES,\" then please provide more detail. No changes in my medical history.   HISTORY OF IMMUNOSUPPRESSION: Do you have a history of any of the following:  Systemic Immunosuppression such as Diabetes, Biologic or Immunotherapy, Chemotherapy, Organ Transplantation, Bone Marrow Transplantation or Prednisone?  No     Answering \"YES\" requires the addition of the dotphrase \"IMMUNOSUPPRESSED\" as the first diagnosis of the patient's visit.   FAMILY HISTORY:  Any \"first degree relatives\" (parent, brother, sister, or child) with the following?    No changes in my family's known health.   PATIENT EXPERIENCE:    Do you want the Dermatologist to perform a COMPLETE skin exam today including a clinical examination under the \"bra and underwear\" areas?  NO  If necessary, do we have your permission to call and leave a detailed message on your Preferred Phone number that includes your specific medical information?  Yes      Allergies   Allergen Reactions    Other Allergic Rhinitis     SEASONAL ALLERGIES AND PET DANDER      Current Outpatient Medications:     acetaminophen-codeine (TYLENOL/CODEINE #3) 300-30 MG per tablet, Take 1 tablet by mouth every 4 (four) hours as needed for moderate pain (or cough) (Patient not taking: Reported on 7/29/2024), Disp: 15 tablet, Rfl: 0    BD Pen Needle Kaykay 2nd Gen 32G X 4 MM MISC, USE TO INJECT INSULIN 4 TIMES A DAY, Disp: 400 " each, Rfl: 0    Continuous Blood Gluc  (Dexcom G6 ) ANNABEL, Use as directed for continuous glucose Monitoring, Disp: 1 each, Rfl: 0    Continuous Blood Gluc Sensor (Dexcom G6 Sensor) MISC, Use as directed for continuous glucose monitoring- change every 10 days, Disp: 9 each, Rfl: 1    Continuous Glucose Transmitter (Dexcom G6 Transmitter) MISC, Use as directed for continuous glucose monitoring- change every 3 months, Disp: 1 each, Rfl: 0    [facility] Glucose 4-6 GM-MG, CHEW 4 TABLETS (16 G TOTAL) AS NEEDED FOR LOW BLOOD SUGAR, Disp: , Rfl:     ergocalciferol (VITAMIN D2) 50,000 units, Take 1 capsule (50,000 Units total) by mouth once a week for 12 doses (Patient not taking: Reported on 9/8/2023), Disp: 12 capsule, Rfl: 0    estradiol (ESTRACE) 2 MG tablet, Take 1 tablet (2 mg total) by mouth 2 (two) times a day, Disp: 180 tablet, Rfl: 3    famotidine (PEPCID) 40 MG tablet, TAKE 1 TABLET BY MOUTH DAILY AT BEDTIME (Patient not taking: Reported on 8/5/2024), Disp: 90 tablet, Rfl: 4    fluticasone (FLONASE) 50 mcg/act nasal spray, SPRAY 2 SPRAYS INTO EACH NOSTRIL EVERY DAY, Disp: 48 mL, Rfl: 4    glucagon (GLUCAGON EMERGENCY) 1 MG injection, Inject 1 mg under the skin once as needed for low blood sugar for up to 1 dose, Disp: 1 kit, Rfl: 0    glucose 4 g chewable tablet, Chew 4 tablets (16 g total) as needed for low blood sugar (Patient not taking: Reported on 6/17/2021), Disp: 100 tablet, Rfl: 3    InPen 405-Okkx-Inbeq ANNABEL, USE INPEN DEVICE TO ADMINISTER HUMALOG INSULIN (Patient not taking: Reported on 6/8/2023), Disp: 1 each, Rfl: 0    insulin degludec (Tresiba FlexTouch) 100 units/mL injection pen, Inject 20 Units under the skin daily, Disp: 15 mL, Rfl: 3    Insulin Disposable Pump (Omnipod 5 G6 Pods, Gen 5,) MISC, 1 cartridge every 72 hours sc, Disp: 10 each, Rfl: 6    insulin lispro (HumaLOG KwikPen) 100 units/mL injection pen, Inject 4-10 Units under the skin 3 (three) times a day with meals (Patient not  taking: Reported on 9/8/2023), Disp: 15 mL, Rfl: 3    Insulin Lispro-aabc (Lyumjev) 100 UNIT/ML SOLN, Inject 300 units in pump every 3 days, Disp: 90 mL, Rfl: 3    Needles & Syringes MISC, Use 3 (three) times a day If needed separately from formulary insulin pens, Disp: 1 each, Rfl: 4    predniSONE 20 mg tablet, Take 1 tablet (20 mg total) by mouth daily, Disp: 7 tablet, Rfl: 0    spironolactone (ALDACTONE) 100 mg tablet, Take 1 tablet (100 mg total) by mouth 2 (two) times a day, Disp: 180 tablet, Rfl: 2    Tresiba FlexTouch 100 units/mL injection pen, INJECT 30 UNITS UNDER THE SKIN EVERY MORNING (Patient not taking: Reported on 6/8/2023), Disp: 30 mL, Rfl: 3    vardenafil (LEVITRA) 20 MG tablet, Take 1 tablet (20 mg total) by mouth daily as needed for erectile dysfunction, Disp: 10 tablet, Rfl: 0          Whom besides the patient is providing clinical information about today's encounter?   NO ADDITIONAL HISTORIAN (patient alone provided history)    Physical Exam and Assessment/Plan by Diagnosis:    Alexandrite Laser Treatment  9-3-2024     Device: Kalyn Gentle Max Pro  Place of Treatment: [facility]     Surgeon and :  Dr Michael     Treatment number: 4  Site of treatment: face and neck  Skin type: Lambert II    Pre-operative Diagnosis:   Indications for Surgery:    Post-operative Diagnosis: same as pre-operative     Anesthetic: Victoriano cooler     Safety Precautions:  Fire extinguisher persent/Window covered/Staff and patient eyes covered/Warning sign posted     Interim History/Comments: no adverse effects from previous procedures      Parameters: Laser was set to 755nm     Fluence: 14 J/cm2  Pulse duration: 3 msec  Spot size: 18 mm       Procedure Note:   After discussing potential procedure related risks including but not limited to pain, purpura, blistering, scarring, discoloration, “footprinting,” recurrence, inefficacy, need for additional treatment, and undesirable cosmetic results, written and  verbal consent were obtained.  Patient was brought back to the operating room. Time out was performed.  Patient's name, identification and intended procedure were verified. Prior to the procedure, the patient cleansed the treatment area. The treatment area was re-cleansed with alcohol. Eye coverings eye shield inserted/placed.      The cooling device was set to 40 msec msec pre / 40 msec delay/      Tolerance: Patient tolerated the procedure well with no immediate significant complications and left in stable condition.   Complications: none  Other procedures: none  Photography: no     Post-op Care: Aftercare was discussed. Continue to ice at home and keep the area moist with a gentle moisturizer. Advised the patient to avoid exercise for the next 24 hours and also to be cautious with sun exposure over the next week. Advised patient to call the office if there is excessive swelling.   Follow-up:  Patient is aware that it will take multiple treatments to treat this condition. Return to clinic for re-treatment in 4 weeks.     BILL INSURANCE FOR CPT CODES 97627, 21840.

## 2024-09-12 LAB
LEFT EYE DIABETIC RETINOPATHY: POSITIVE
RIGHT EYE DIABETIC RETINOPATHY: POSITIVE
SEVERITY (EYE EXAM): NORMAL

## 2024-09-14 DIAGNOSIS — E10.65 TYPE 1 DIABETES MELLITUS WITH HYPERGLYCEMIA (HCC): ICD-10-CM

## 2024-09-16 RX ORDER — INSULIN PMP CART,AUT,G6/7,CNTR
EACH SUBCUTANEOUS
Qty: 10 EACH | Refills: 0 | Status: SHIPPED | OUTPATIENT
Start: 2024-09-16

## 2024-09-30 ENCOUNTER — APPOINTMENT (OUTPATIENT)
Dept: LAB | Facility: CLINIC | Age: 44
End: 2024-09-30
Payer: COMMERCIAL

## 2024-09-30 ENCOUNTER — TELEPHONE (OUTPATIENT)
Dept: ADMINISTRATIVE | Facility: OTHER | Age: 44
End: 2024-09-30

## 2024-09-30 DIAGNOSIS — E10.65 TYPE 1 DIABETES MELLITUS WITH HYPERGLYCEMIA (HCC): ICD-10-CM

## 2024-09-30 DIAGNOSIS — F64.9 GENDER DYSPHORIA: ICD-10-CM

## 2024-09-30 DIAGNOSIS — Z78.9 TRANSGENDER: ICD-10-CM

## 2024-09-30 DIAGNOSIS — E13.9 LADA (LATENT AUTOIMMUNE DIABETES IN ADULTS), MANAGED AS TYPE 1 (HCC): ICD-10-CM

## 2024-09-30 DIAGNOSIS — Z96.41 INSULIN PUMP IN PLACE: ICD-10-CM

## 2024-09-30 LAB
ALBUMIN SERPL BCG-MCNC: 3.9 G/DL (ref 3.5–5)
ALP SERPL-CCNC: 44 U/L (ref 34–104)
ALT SERPL W P-5'-P-CCNC: 14 U/L (ref 7–52)
ANION GAP SERPL CALCULATED.3IONS-SCNC: 11 MMOL/L (ref 4–13)
AST SERPL W P-5'-P-CCNC: 14 U/L (ref 5–45)
BILIRUB SERPL-MCNC: 0.59 MG/DL (ref 0.2–1)
BUN SERPL-MCNC: 8 MG/DL (ref 5–25)
CALCIUM SERPL-MCNC: 9.3 MG/DL (ref 8.4–10.2)
CHLORIDE SERPL-SCNC: 99 MMOL/L (ref 96–108)
CHOLEST SERPL-MCNC: 165 MG/DL
CO2 SERPL-SCNC: 28 MMOL/L (ref 21–32)
CREAT SERPL-MCNC: 0.75 MG/DL (ref 0.6–1.3)
EST. AVERAGE GLUCOSE BLD GHB EST-MCNC: 163 MG/DL
ESTRADIOL SERPL-MCNC: 177.1 PG/ML
GLUCOSE P FAST SERPL-MCNC: 180 MG/DL (ref 65–99)
HBA1C MFR BLD: 7.3 %
HDLC SERPL-MCNC: 74 MG/DL
LDLC SERPL CALC-MCNC: 52 MG/DL (ref 0–100)
NONHDLC SERPL-MCNC: 91 MG/DL
POTASSIUM SERPL-SCNC: 4.1 MMOL/L (ref 3.5–5.3)
PROT SERPL-MCNC: 7.1 G/DL (ref 6.4–8.4)
SODIUM SERPL-SCNC: 138 MMOL/L (ref 135–147)
TESTOST SERPL-MSCNC: 45 NG/DL
TRIGL SERPL-MCNC: 196 MG/DL

## 2024-09-30 PROCEDURE — 84403 ASSAY OF TOTAL TESTOSTERONE: CPT

## 2024-09-30 PROCEDURE — 80053 COMPREHEN METABOLIC PANEL: CPT

## 2024-09-30 PROCEDURE — 36415 COLL VENOUS BLD VENIPUNCTURE: CPT

## 2024-09-30 PROCEDURE — 80061 LIPID PANEL: CPT

## 2024-09-30 PROCEDURE — 82670 ASSAY OF TOTAL ESTRADIOL: CPT

## 2024-09-30 PROCEDURE — 83036 HEMOGLOBIN GLYCOSYLATED A1C: CPT

## 2024-09-30 NOTE — TELEPHONE ENCOUNTER
Upon review of the In Basket request we were able to locate, review, and update the patient chart as requested for Diabetic Eye Exam.    Any additional questions or concerns should be emailed to the Practice Liaisons via the appropriate education email address, please do not reply via In Basket.    Thank you  Mitali Burton MA   [facility]

## 2024-09-30 NOTE — TELEPHONE ENCOUNTER
----- Message from Jose Lopez MD sent at 9/27/2024 11:43 AM EDT -----  Regarding: Diabetic eye  Diabetic eye exam in media.  Please close care gap and update

## 2024-10-03 ENCOUNTER — TELEPHONE (OUTPATIENT)
Age: 44
End: 2024-10-03

## 2024-10-03 ENCOUNTER — TELEPHONE (OUTPATIENT)
Dept: ENDOCRINOLOGY | Facility: CLINIC | Age: 44
End: 2024-10-03

## 2024-10-03 NOTE — TELEPHONE ENCOUNTER
Patient called back to reschedule his appointment  that was cancel for  Friday . I call the office that they said that he could keep the appointment that was schedule  for Friday  at 8:20 she confirm that she would come in.

## 2024-10-03 NOTE — TELEPHONE ENCOUNTER
Called and left a voicemail that we need to reschedule 10-4-2024.Apologized an said I would call her when we have a time for her possibility of a virtual.

## 2024-10-04 ENCOUNTER — OFFICE VISIT (OUTPATIENT)
Dept: ENDOCRINOLOGY | Facility: CLINIC | Age: 44
End: 2024-10-04
Payer: COMMERCIAL

## 2024-10-04 VITALS
HEIGHT: 66 IN | BODY MASS INDEX: 29.57 KG/M2 | HEART RATE: 87 BPM | SYSTOLIC BLOOD PRESSURE: 120 MMHG | OXYGEN SATURATION: 98 % | WEIGHT: 184 LBS | DIASTOLIC BLOOD PRESSURE: 80 MMHG

## 2024-10-04 DIAGNOSIS — Z78.9 TRANSGENDER: ICD-10-CM

## 2024-10-04 DIAGNOSIS — Z79.899 TRANSGENDER WOMAN ON HORMONE THERAPY: ICD-10-CM

## 2024-10-04 DIAGNOSIS — E10.65 TYPE 1 DIABETES MELLITUS WITH HYPERGLYCEMIA (HCC): Primary | ICD-10-CM

## 2024-10-04 DIAGNOSIS — Z96.41 INSULIN PUMP IN PLACE: ICD-10-CM

## 2024-10-04 DIAGNOSIS — E13.9 LADA (LATENT AUTOIMMUNE DIABETES IN ADULTS), MANAGED AS TYPE 1 (HCC): ICD-10-CM

## 2024-10-04 DIAGNOSIS — F64.0 TRANSGENDER WOMAN ON HORMONE THERAPY: ICD-10-CM

## 2024-10-04 PROCEDURE — 95251 CONT GLUC MNTR ANALYSIS I&R: CPT | Performed by: INTERNAL MEDICINE

## 2024-10-04 PROCEDURE — 99214 OFFICE O/P EST MOD 30 MIN: CPT | Performed by: INTERNAL MEDICINE

## 2024-10-04 NOTE — PROGRESS NOTES
Assessment and Plan:  1. Type 1 diabetes mellitus with hyperglycemia (HCC)  Assessment & Plan:    Lab Results   Component Value Date    HGBA1C 7.3 (H) 09/30/2024     Uncontrolled type 1 diabetes mellitus with hyperglycemia, complications include retinopathy  Regimen: Insulin pump OmniPod 5 system - settings (changes noted in plan)  On Automated mode   Basal rate: 0.9/h   I:C ratio: 12am 10 g/unit - 7am 8 g/unit - 6pm 9.5 g/unit  ISF: 50 mg/dL   Correction threshold = 180 mg/dL   Target = 110 mg/dL   Active insulin time = 3.5 H   Glucose checks-Dexcom G6 report reviewed  GMI 7.9  Interpretation of data:  Hyperglycemia post-prandially, mostly after dinner which is her largest meal. Overnight, tends to return to euglycemia.   Hyperglycemic events: When above 200- symptoms include blurry vision, shortness of breath, increased thirst  Diabetic foot exam completed today, risk category score = 0  Labs reviewed-September 2024 , creatinine 0.75 protein baseline creatinine, triglycerides 196, LDL 52 at goal, A1c 7.3%.  January 2024 albumin/creatinine ratio negative for microalbuminuria.  Underwent eye examination September 2024-positive for bilateral retinopathy, patient reports that it was reported as improved from prior, more on the left eye when compared to the right    Insulin pump setting changes (underlined/bolded text):  On Automated mode   Basal rate: 0.9/h   I:C ratio: 12am 10 g/unit - 7am 8 g/unit - 6pm 9 g/unit  ISF: 50 mg/dL   Correction threshold = 180 mg/dL   Target = 110 mg/dL   Active insulin time = 3.5 H   Counseled to decrease total amount of carb intake with her meals  Recommended to check if her pump is compatible with Dexcom G7  Sample for Dexcom G7 provided  Plan for download of pump report in 2 weeks  Labs to be completed in 3 months hemoglobin A1c and BMP  Follow-up in 3 months  Orders:  -     Ambulatory Referral to Podiatry; Future  -     Hemoglobin A1C With EAG; Future; Expected date:  01/04/2025  -     Basic metabolic panel; Future; Expected date: 01/04/2025  2. 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 Insulin pump in place  4. Transgender  5. Transgender woman on hormone therapy  Assessment & Plan:  Follow-up again NephrAmine rubia, 60s pronouns are she her  On hormone therapy with estradiol 2 mg twice a day and spironolactone 100 mg twice a day-reports feeling well on therapy and is pleats with the changes   Underwent voice surgery with ENT and is pending further surgical interventions  Also following with dermatology for management of facial hair  Most recent chemistry panel with a potassium of 4.1 within normal limits  Labs reviewed-estradiol at 177.1 (100-200) which is at goal and testosterone at 45 which is also at goal (<50)    Continue with current therapy of estradiol 2 mg twice a day and spironolactone 100 mg twice a day  Will plan for follow-up in 3 months for diabetic care and at that time labs for repeat assessment after 6 months from last estradiol and testosterone can be ordered     Nutrition Assessment and Intervention:     Reviewed food recall journal    New Nutrition Prescription completed with patient      Physical Activity Assessment and Intervention:    Activity journal reviewed         HPI:   Jody Pascal   - 44 y.o. female that arrives to clinic for follow-up on type 1 diabetes mellitus/SIA on insulin pump and gender affirming care. Patient reports overall doing well.     For Gender affirming care - Jody's pronouns are she/her. Reports doing well and feeling good with hormone therapy - is prescribed estradiol 2 mg twice a day and spironolactone 100 mg twice a day. Is pleased with the changes that have occurred with her body. Recently underwent voice surgery by ENT and reports further surgery to occur. Follows with dermatology - currently facial hair is the most bothersome. Reports that body hair has changed, is becoming thinner and is  pleased with this change. Reports her mood is stable.       For Type II DM is managed with OmniPod 5 insulin pump - settings:  On Automated mode   Basal rate: 0.9/h   I:C ratio: 12am 10 g/unit - 7am 8 g/unit - 6pm 9.5 g/unit  ISF: 50 mg/dL   Correction threshold = 180 mg/dL   Target = 110 mg/dL   Active insulin time = 3.5 H     Positive family history of diabetes mellitus-dad, diagnosis type II and she reports likely is type I as well    Glucose checks-Device used Dexcom G6    More than 72 hours of data was reviewed. Report to be scanned to chart.   Date Range: Sept 20 - Oct 3, 2024  Analysis of data:   -% time CGM used: 100%  -Average Glucose: 191  -GMI 7.9   -SD : 71   -Time in Target Range: 56%   -Time Above Range: High 22%, very high 22%   -Time Below Range: 0%, 0%    Interpretation of data:  Hyperglycemia post-prandially, mostly after dinner which is her largest meal. Overnight, tends to return to euglycemia.     Hyperglycemic events: When above 200- symptoms include blurry vision, shortness of breath, increased thirst    Patient's diet -follows a vegan diet.  Consumes 2 meals per day.  Brunch-bagel or chili.  Dinner-rice and beans, topicals, baked pasta.  Has participated in diabetic education in the past  Patient's exercise regimen/physical activity -does 30 minutes of yoga every day and recently started running.   Patient's alcohol intake is casual, and does not make use of tobacco or recreational drugs.  She quit smoking 5 years ago    Patient is up-to-date on eye exam, she reports completed 2 months ago showing improvement in her retinopathy more on the left than compared to the right, foot exam completed today.   ACEi/ARB: None  Statin: None      Patient has no other complaints at this time.      Subjective:  Review of Systems   Constitutional:  Negative for chills, diaphoresis, fatigue and unexpected weight change.   Eyes:  Positive for visual disturbance.   Respiratory:  Positive for shortness of  breath. Negative for chest tightness.    Cardiovascular:  Negative for chest pain and palpitations.   Gastrointestinal:  Negative for abdominal pain, constipation, diarrhea, nausea and vomiting.   Endocrine: Positive for polydipsia. Negative for polyphagia and polyuria.   Skin:  Negative for color change and wound.   Neurological:  Negative for tremors, weakness, numbness and headaches.   Psychiatric/Behavioral:  Negative for dysphoric mood. The patient is not nervous/anxious.         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    Mild nonproliferative diabetic retinopathy associated with type 1 diabetes mellitus (HCC)    Type 1 diabetes mellitus with hyperglycemia (HCC)    Transgender    Gender dysphoria    Hypoglycemia    Erectile dysfunction    Insulin pump in place    Hypercalcemia    Vitamin D deficiency    Snoring    Transgender woman on hormone therapy        Current Outpatient Medications   Medication Sig Dispense Refill    Continuous Blood Gluc  (Dexcom G6 ) ANNABEL Use as directed for continuous glucose Monitoring 1 each 0    Continuous Glucose Transmitter (Dexcom G6 Transmitter) MISC Use as directed for continuous glucose monitoring- change every 3 months 1 each 0    estradiol (ESTRACE) 2 MG tablet Take 1 tablet (2 mg total) by mouth 2 (two) times a day 180 tablet 3    Insulin Disposable Pump (Omnipod 5 G6 Pods, Gen 5,) MISC 1 cartridge every 72 hours sc 10 each 0    Insulin Lispro-aabc (Lyumjev) 100 UNIT/ML SOLN Inject 300 units in pump every 3 days 90 mL 3    Needles & Syringes MISC Use 3 (three) times a day If needed separately from formulary insulin pens 1 each 4    spironolactone (ALDACTONE) 100 mg tablet Take 1 tablet (100 mg total) by mouth 2 (two) times a day 180 tablet 2     acetaminophen-codeine (TYLENOL/CODEINE #3) 300-30 MG per tablet Take 1 tablet by mouth every 4 (four) hours as needed for moderate pain (or cough) 15 tablet 0    BD Pen Needle Kaykay 2nd Gen 32G X 4 MM MISC USE TO INJECT INSULIN 4 TIMES A  each 0    Continuous Blood Gluc Sensor (Dexcom G6 Sensor) MISC Use as directed for continuous glucose monitoring- change every 10 days 9 each 1    [employer] Glucose 4-6 GM-MG CHEW 4 TABLETS (16 G TOTAL) AS NEEDED FOR LOW BLOOD SUGAR      ergocalciferol (VITAMIN D2) 50,000 units Take 1 capsule (50,000 Units total) by mouth once a week for 12 doses 12 capsule 0    famotidine (PEPCID) 40 MG tablet TAKE 1 TABLET BY MOUTH DAILY AT BEDTIME 90 tablet 4    fluticasone (FLONASE) 50 mcg/act nasal spray SPRAY 2 SPRAYS INTO EACH NOSTRIL EVERY DAY 48 mL 4    glucagon (GLUCAGON EMERGENCY) 1 MG injection Inject 1 mg under the skin once as needed for low blood sugar for up to 1 dose 1 kit 0    glucose 4 g chewable tablet Chew 4 tablets (16 g total) as needed for low blood sugar 100 tablet 3    InPen 100-Blue-Milagros ANNABEL USE INPEN DEVICE TO ADMINISTER HUMALOG INSULIN (Patient not taking: Reported on 6/8/2023) 1 each 0    insulin degludec (Tresiba FlexTouch) 100 units/mL injection pen Inject 20 Units under the skin daily 15 mL 3    insulin lispro (HumaLOG KwikPen) 100 units/mL injection pen Inject 4-10 Units under the skin 3 (three) times a day with meals (Patient not taking: Reported on 9/8/2023) 15 mL 3    predniSONE 20 mg tablet Take 1 tablet (20 mg total) by mouth daily 7 tablet 0    Tresiba FlexTouch 100 units/mL injection pen INJECT 30 UNITS UNDER THE SKIN EVERY MORNING 30 mL 3    vardenafil (LEVITRA) 20 MG tablet Take 1 tablet (20 mg total) by mouth daily as needed for erectile dysfunction (Patient not taking: Reported on 10/4/2024) 10 tablet 0     No current facility-administered medications for this visit.        Allergies   Allergen Reactions    Other Allergic Rhinitis     SEASONAL  "ALLERGIES AND PET DANDER        The following portions of the patient's history were reviewed and updated as appropriate: allergies, current medications, past family history, past medical history, past social history, past surgical history and problem list.             Objective:  /80 (BP Location: Left arm, Patient Position: Sitting, Cuff Size: Large)   Pulse 87   Ht 5' 6\" (1.676 m)   Wt 83.5 kg (184 lb)   SpO2 98%   BMI 29.70 kg/m²      Body mass index is 29.7 kg/m².     Physical Exam  Vitals reviewed.   Constitutional:       Appearance: Normal appearance. She is overweight. She is not ill-appearing or diaphoretic.   HENT:      Head: Normocephalic and atraumatic.   Eyes:      General: No scleral icterus.     Conjunctiva/sclera: Conjunctivae normal.   Cardiovascular:      Rate and Rhythm: Normal rate and regular rhythm.      Pulses: Normal pulses. no weak pulses.           Dorsalis pedis pulses are 2+ on the right side and 2+ on the left side.        Posterior tibial pulses are 2+ on the right side and 2+ on the left side.      Heart sounds: Normal heart sounds. No murmur heard.     No gallop.   Pulmonary:      Effort: Pulmonary effort is normal. No respiratory distress.      Breath sounds: Normal breath sounds. No wheezing or rales.   Abdominal:      General: Bowel sounds are normal. There is no distension.      Palpations: Abdomen is soft.   Musculoskeletal:         General: Normal range of motion.      Cervical back: Normal range of motion and neck supple.      Right lower leg: No edema.      Left lower leg: No edema.   Feet:      Right foot:      Skin integrity: Dry skin present. No ulcer, skin breakdown, erythema, warmth or callus.      Left foot:      Skin integrity: Dry skin present. No ulcer, skin breakdown, erythema, warmth or callus.   Skin:     General: Skin is warm and dry.      Coloration: Skin is not jaundiced or pale.   Neurological:      General: No focal deficit present.      Mental " Status: She is alert and oriented to person, place, and time. Mental status is at baseline.      Sensory: No sensory deficit.   Psychiatric:         Mood and Affect: Mood normal.         Behavior: Behavior normal.      Diabetic Foot Exam    Patient's shoes and socks removed.    Right Foot/Ankle   Right Foot Inspection  Skin Exam: skin normal, skin intact and dry skin. No warmth, no callus, no erythema, no maceration, no abnormal color, no pre-ulcer, no ulcer and no callus.     Toe Exam: Erythema and  no right toe deformity    Sensory   Monofilament testing: intact    Vascular  Capillary refills: elevated  The right DP pulse is 2+. The right PT pulse is 2+.     Left Foot/Ankle  Left Foot Inspection  Skin Exam: skin normal, skin intact and dry skin. No warmth, no erythema, no maceration, normal color, no pre-ulcer, no ulcer and no callus.     Toe Exam: No erythema and no left toe deformity.     Sensory   Monofilament testing: intact    Vascular  Capillary refills: elevated  The left DP pulse is 2+. The left PT pulse is 2+.     Assign Risk Category  No deformity present  No loss of protective sensation  No weak pulses  Risk: 0      Labs:  I have personally reviewed the following labs.   Latest Reference Range & Units 01/11/24 07:18 07/01/24 09:17 07/05/24 07:10 07/05/24 10:36 09/30/24 08:37   Sodium 135 - 147 mmol/L 138 137   138   Potassium 3.5 - 5.3 mmol/L 4.2 4.5   4.1   Chloride 96 - 108 mmol/L 102 103   99   Carbon Dioxide 21 - 32 mmol/L 25 24   28   ANION GAP 4 - 13 mmol/L 11 10   11   BUN 5 - 25 mg/dL 13 8   8   Creatinine 0.60 - 1.30 mg/dL 0.58 (L) 0.74   0.75   GLUCOSE, FASTING 65 - 99 mg/dL 127 (H) 167 (H)   180 (H)   Calcium 8.4 - 10.2 mg/dL 9.0 9.3   9.3   AST 5 - 45 U/L  18   14   ALT 7 - 52 U/L  15   14   ALK PHOS 34 - 104 U/L  41   44   Total Protein 6.4 - 8.4 g/dL  7.1   7.1   Albumin 3.5 - 5.0 g/dL  3.9   3.9   Total Bilirubin 0.20 - 1.00 mg/dL  0.29   0.59   GFR, Calculated  See Comment See Comment    See Comment   Cholesterol See Comment mg/dL 153 159   165   Triglycerides See Comment mg/dL 139 149   196 (H)   HDL >=40 mg/dL 59 55   74   Non-HDL Cholesterol mg/dl 94 104   91   LDL Calculated 0 - 100 mg/dL 66 74   52   VITAMIN D, 25-HYDROXY 30.0 - 100.0 ng/mL  67.6      Hemoglobin A1C Normal 4.0-5.6%; PreDiabetic 5.7-6.4%; Diabetic >=6.5%; Glycemic control for adults with diabetes <7.0% %  7.9 (H)   7.3 (H)   eAG, EST AVG Glucose mg/dl  180   163   POC Glucose 65 - 140 mg/dl   234 (H) 198 (H)    ESTRADIOL LEVEL See Comment pg/mL 118.1 240.9   177.1   TESTOSTERONE TOTAL See Comment ng/dL 37    45   Testosterone, Total, LC/ - 916 ng/dL  8 (L)      TESTOSTERONE FREE 6.8 - 21.5 pg/mL  0.9 (L)      Albumin Creat Ratio 0 - 30 mg/g creatinine 9       (L): Data is abnormally low  (H): Data is abnormally high     Imaging:  I have personally reviewed the following imaging.   Diabetes Eye Exam  Order: [id number]   Status: Final result       Visible to patient: Yes (seen)       Next appt: 12/17/2024 at 11:00 AM in Otolaryngology (Graham Wiggins MD)    0 Result Notes       1  Topic      Component 9/12/24 12:03 PM   Severity Alert   Right Eye Diabetic Retinopathy Positive   Left Eye Diabetic Retinopathy Positive               Last Resulted: 09/12/24 12:03 PM                  Michael Oliveros MD  Endocrinology Fellow, PGY-4

## 2024-10-04 NOTE — ASSESSMENT & PLAN NOTE
Follow-up again NephrAmine care, 60s pronouns are she her  On hormone therapy with estradiol 2 mg twice a day and spironolactone 100 mg twice a day-reports feeling well on therapy and is pleats with the changes   Underwent voice surgery with ENT and is pending further surgical interventions  Also following with dermatology for management of facial hair  Most recent chemistry panel with a potassium of 4.1 within normal limits  Labs reviewed-estradiol at 177.1 (100-200) which is at goal and testosterone at 45 which is also at goal (<50)    Continue with current therapy of estradiol 2 mg twice a day and spironolactone 100 mg twice a day  Will plan for follow-up in 3 months for diabetic care and at that time labs for repeat assessment after 6 months from last estradiol and testosterone can be ordered

## 2024-10-04 NOTE — ASSESSMENT & PLAN NOTE
Lab Results   Component Value Date    HGBA1C 7.3 (H) 09/30/2024     Uncontrolled type 1 diabetes mellitus with hyperglycemia, complications include retinopathy  Regimen: Insulin pump OmniPod 5 system - settings (changes noted in plan)  On Automated mode   Basal rate: 0.9/h   I:C ratio: 12am 10 g/unit - 7am 8 g/unit - 6pm 9.5 g/unit  ISF: 50 mg/dL   Correction threshold = 180 mg/dL   Target = 110 mg/dL   Active insulin time = 3.5 H   Glucose checks-Dexcom G6 report reviewed  GMI 7.9  Interpretation of data:  Hyperglycemia post-prandially, mostly after dinner which is her largest meal. Overnight, tends to return to euglycemia.   Hyperglycemic events: When above 200- symptoms include blurry vision, shortness of breath, increased thirst  Diabetic foot exam completed today, risk category score = 0  Labs reviewed-September 2024 , creatinine 0.75 protein baseline creatinine, triglycerides 196, LDL 52 at goal, A1c 7.3%.  January 2024 albumin/creatinine ratio negative for microalbuminuria.  Underwent eye examination September 2024-positive for bilateral retinopathy, patient reports that it was reported as improved from prior, more on the left eye when compared to the right    Insulin pump setting changes (underlined/bolded text):  On Automated mode   Basal rate: 0.9/h   I:C ratio: 12am 10 g/unit - 7am 8 g/unit - 6pm 9 g/unit  ISF: 50 mg/dL   Correction threshold = 180 mg/dL   Target = 110 mg/dL   Active insulin time = 3.5 H   Counseled to decrease total amount of carb intake with her meals  Recommended to check if her pump is compatible with Dexcom G7  Sample for Dexcom G7 provided  Plan for download of pump report in 2 weeks  Labs to be completed in 3 months hemoglobin A1c and BMP  Follow-up in 3 months

## 2024-10-04 NOTE — PATIENT INSTRUCTIONS
Settings as changed, change carb to insulin ratio to 9 at 6 PM  Please enter all carbohydrates including small carbohydrate containing snacks into the pump  Please let me know if your OmniPod is compatible with G7 so that we can order those for you  Plan for pump download in 2 weeks  Follow-up in 3 months with repeat labs

## 2024-10-07 ENCOUNTER — TELEPHONE (OUTPATIENT)
Age: 44
End: 2024-10-07

## 2024-10-09 DIAGNOSIS — F64.9 GENDER DYSPHORIA: ICD-10-CM

## 2024-10-09 DIAGNOSIS — E10.65 TYPE 1 DIABETES MELLITUS WITH HYPERGLYCEMIA (HCC): Primary | ICD-10-CM

## 2024-10-09 DIAGNOSIS — Z78.9 TRANSGENDER: ICD-10-CM

## 2024-10-09 RX ORDER — ACYCLOVIR 400 MG/1
1 TABLET ORAL
Qty: 9 EACH | Refills: 3 | Status: SHIPPED | OUTPATIENT
Start: 2024-10-09

## 2024-10-09 RX ORDER — INSULIN PMP CART,AUT,G6/7,CNTR
EACH SUBCUTANEOUS
Qty: 30 EACH | Refills: 3 | Status: SHIPPED | OUTPATIENT
Start: 2024-10-09

## 2024-10-09 RX ORDER — ESTRADIOL 2 MG/1
2 TABLET ORAL 2 TIMES DAILY
Qty: 180 TABLET | Refills: 1 | Status: SHIPPED | OUTPATIENT
Start: 2024-10-09

## 2024-10-09 RX ORDER — SPIRONOLACTONE 100 MG/1
100 TABLET, FILM COATED ORAL 2 TIMES DAILY
Qty: 180 TABLET | Refills: 1 | Status: SHIPPED | OUTPATIENT
Start: 2024-10-09

## 2024-10-10 ENCOUNTER — TELEPHONE (OUTPATIENT)
Dept: OTHER | Facility: HOSPITAL | Age: 44
End: 2024-10-10

## 2024-10-10 NOTE — TELEPHONE ENCOUNTER
Pharmacy comment: Alternative Requested:[place].     Reason for call:   [] Refill   [x] Prior Auth  [] Other:     Office:   [] PCP/Provider -   [x] Specialty/Provider - Endo    Medication:   Insulin Disposable Pump (Omnipod 5 G7 Pods, Gen 5,) MISC     Dose/Frequency:  USE ONE POD EVERY 3 DAYS     Quantity: 30 each    Pharmacy: [id number] - [place] [place] - [address]      Does the patient have enough for 3 days?   [] Yes   [] No - Send as HP to POD

## 2024-10-11 ENCOUNTER — TELEPHONE (OUTPATIENT)
Age: 44
End: 2024-10-11

## 2024-10-11 NOTE — TELEPHONE ENCOUNTER
PA for Continuous Glucose Sensor (Dexcom G7 Sensor)   NOT REQUIRED     Reason CALLED INSURANCE THEY STATED PA NOT REQUIRED.          Patient advised by          [] MyChart Message  [] Phone call   []LMOM  []L/M to call office as no active Communication consent on file  []Unable to leave detailed message as VM not approved on Communication consent       Pharmacy advised by    []Fax  []Phone call   PHARMACY RAN CLAIM ON 10/10/24

## 2024-10-11 NOTE — TELEPHONE ENCOUNTER
PA for Insulin Disposable Pump (Omnipod 5 G7 Pods, Gen 5,) MISC  NOT REQUIRED     Reason CALLED INSURANCE AND THEY STATED THAT PA  NOT REQUIRED AT THE QUANTITY OF 10 FOR 30.           Patient advised by          [] MyChart Message  [] Phone call   []LMOM  []L/M to call office as no active Communication consent on file  []Unable to leave detailed message as VM not approved on Communication consent       Pharmacy advised by    []Fax  []Phone call    PHARMACY RAN THE CLAIM 10/11/24

## 2024-10-14 ENCOUNTER — TELEPHONE (OUTPATIENT)
Dept: ENDOCRINOLOGY | Facility: CLINIC | Age: 44
End: 2024-10-14

## 2024-10-14 NOTE — TELEPHONE ENCOUNTER
Encounter for forms         Please refer to the following information:       Type of Form: Omnipod 5    Date of Visit (if applicable): 10/4/2024    Doctor: Dr. Harden     Fax number: [phone]    Patient phone number: [phone]      Original on Dr. Harden desk to be completed.

## 2024-10-18 ENCOUNTER — TELEPHONE (OUTPATIENT)
Dept: ENDOCRINOLOGY | Facility: CLINIC | Age: 44
End: 2024-10-18

## 2024-10-18 NOTE — TELEPHONE ENCOUNTER
"Please resubmit the prior Auth for the omnipod  5 G7. Omnipod rep stated to Please use the following language when completing Prior Auth and attach office notes documenting increased insulin needs.     \"Patient current Total Daily Dose is greater than 80u/d. Pod holds 200u of insulin. Please approve 48hr change\".     Thank you,  Olimpia  "

## 2024-11-21 ENCOUNTER — OFFICE VISIT (OUTPATIENT)
Dept: PODIATRY | Facility: CLINIC | Age: 44
End: 2024-11-21
Payer: COMMERCIAL

## 2024-11-21 VITALS
HEIGHT: 66 IN | BODY MASS INDEX: 30.37 KG/M2 | SYSTOLIC BLOOD PRESSURE: 122 MMHG | WEIGHT: 189 LBS | DIASTOLIC BLOOD PRESSURE: 81 MMHG | HEART RATE: 101 BPM | OXYGEN SATURATION: 98 %

## 2024-11-21 DIAGNOSIS — E10.65 TYPE 1 DIABETES MELLITUS WITH HYPERGLYCEMIA (HCC): ICD-10-CM

## 2024-11-21 DIAGNOSIS — B35.3 TINEA PEDIS OF BOTH FEET: Primary | ICD-10-CM

## 2024-11-21 DIAGNOSIS — E11.9 ENCOUNTER FOR DIABETIC FOOT EXAM (HCC): ICD-10-CM

## 2024-11-21 PROCEDURE — 99213 OFFICE O/P EST LOW 20 MIN: CPT | Performed by: PODIATRIST

## 2024-11-21 RX ORDER — KETOCONAZOLE 20 MG/G
CREAM TOPICAL DAILY
Qty: 60 G | Refills: 3 | Status: SHIPPED | OUTPATIENT
Start: 2024-11-21

## 2024-11-21 NOTE — PROGRESS NOTES
Assessment/Plan:     The patient's clinical examination today significant for some dry scaling skin to the distal aspect of the dorsal forefoot bilaterally.  Some mild dry scaling skin is also noted in the anterior digital spaces bilaterally.  There is no pruritus.  There are no open lesions.  Pedal pulses are palpable.  Muscle power is 5 out of 5 in all directions.  Proprioception and vibratory sensations are intact bilaterally.    A diabetic foot examination was performed and the patient is deemed to be in the low risk category.  Her most recent hemoglobin A1c from September 2024 was 7.3, prior to that was 7.9 in July 2024.  We discussed the importance of daily foot checks and to look for open wounds or signs of irritation or inflammation to include redness swelling and heat.    From podiatric standpoint, she is doing well.  Recommend follow-up annually for routine diabetic foot examination.     Diagnoses and all orders for this visit:    Tinea pedis of both feet  -     ketoconazole (NIZORAL) 2 % cream; Apply topically daily    Type 1 diabetes mellitus with hyperglycemia (HCC)  -     Ambulatory Referral to Podiatry    Encounter for diabetic foot exam (HCC)          Subjective:     Patient ID: Jody Pascal is a 44 y.o. adult.    The patient presents today for initial consultation with [facility] for diabetic foot examination.  Patient was referred to us by her endocrinologist.  The patient notes no acute pedal issues today.  She does however note some occasional tingling sensations that are transient in nature that typically occurs across the ball of her foot usually within the day.  She notes no pain or discomfort to either lower extremity.      PAST MEDICAL HISTORY:  Past Medical History:   Diagnosis Date    Allergic     Diabetes mellitus (HCC) 10/25/2019    Former smoker 1/25/2019       PAST SURGICAL HISTORY:  Past Surgical History:   Procedure Laterality Date    IL LARGSC W/NJX VOCAL CORD THER  W/MICRO/TELESCOPE Bilateral 7/5/2024    Procedure: micro direct laryngoscopy, glotto plasty, vocal fold injection;  Surgeon: Graham Wiggins MD;  Location: [facility];  Service: ENT    VOCAL CORD INJECTION N/A 7/5/2024    Procedure: MICROLARYNGOSCOPY FOR VOCAL CORD STRIPPING WITH INJECTION;  Surgeon: Graham Wiggins MD;  Location: [facility];  Service: ENT        ALLERGIES:  Other    MEDICATIONS:  Current Outpatient Medications   Medication Sig Dispense Refill    acetaminophen-codeine (TYLENOL/CODEINE #3) 300-30 MG per tablet Take 1 tablet by mouth every 4 (four) hours as needed for moderate pain (or cough) 15 tablet 0    BD Pen Needle Kaykay 2nd Gen 32G X 4 MM MISC USE TO INJECT INSULIN 4 TIMES A  each 0    Continuous Glucose Sensor (Dexcom G7 Sensor) Use 1 Device every 10 days 9 each 3    CVS Glucose 4-6 GM-MG CHEW 4 TABLETS (16 G TOTAL) AS NEEDED FOR LOW BLOOD SUGAR      estradiol (ESTRACE) 2 MG tablet TAKE 1 TABLET BY MOUTH 2 TIMES A DAY. 180 tablet 1    famotidine (PEPCID) 40 MG tablet TAKE 1 TABLET BY MOUTH DAILY AT BEDTIME 90 tablet 4    fluticasone (FLONASE) 50 mcg/act nasal spray SPRAY 2 SPRAYS INTO EACH NOSTRIL EVERY DAY 48 mL 4    glucagon (GLUCAGON EMERGENCY) 1 MG injection Inject 1 mg under the skin once as needed for low blood sugar for up to 1 dose 1 kit 0    glucose 4 g chewable tablet Chew 4 tablets (16 g total) as needed for low blood sugar 100 tablet 3    insulin degludec (Tresiba FlexTouch) 100 units/mL injection pen Inject 20 Units under the skin daily 15 mL 3    Insulin Disposable Pump (Omnipod 5 G7 Pods, Gen 5,) MISC USE ONE POD EVERY 3 DAYS 30 each 3    Insulin Lispro-aabc (Lyumjev) 100 UNIT/ML SOLN Inject 300 units in pump every 3 days 90 mL 3    ketoconazole (NIZORAL) 2 % cream Apply topically daily 60 g 3    Needles & Syringes MISC Use 3 (three) times a day If needed separately from formulary insulin pens 1 each 4    spironolactone (ALDACTONE) 100 mg tablet TAKE 1 TABLET BY MOUTH TWICE  A  tablet 1    Tresiba FlexTouch 100 units/mL injection pen INJECT 30 UNITS UNDER THE SKIN EVERY MORNING 30 mL 3    ergocalciferol (VITAMIN D2) 50,000 units Take 1 capsule (50,000 Units total) by mouth once a week for 12 doses 12 capsule 0    InPen 100-Blue-Milagros ANNABEL USE INPEN DEVICE TO ADMINISTER HUMALOG INSULIN (Patient not taking: No sig reported) 1 each 0    insulin lispro (HumaLOG KwikPen) 100 units/mL injection pen Inject 4-10 Units under the skin 3 (three) times a day with meals (Patient not taking: Reported on 2023) 15 mL 3    predniSONE 20 mg tablet Take 1 tablet (20 mg total) by mouth daily 7 tablet 0    vardenafil (LEVITRA) 20 MG tablet Take 1 tablet (20 mg total) by mouth daily as needed for erectile dysfunction (Patient not taking: Reported on 10/4/2024) 10 tablet 0     No current facility-administered medications for this visit.       SOCIAL HISTORY:  Social History     Socioeconomic History    Marital status: /Civil Union     Spouse name: None    Number of children: 0    Years of education: None    Highest education level: None   Occupational History    None   Tobacco Use    Smoking status: Former     Current packs/day: 0.00     Average packs/day: 0.5 packs/day for 20.2 years (10.1 ttl pk-yrs)     Types: Cigarettes     Start date: 1998     Quit date: 11/15/2018     Years since quittin.0    Smokeless tobacco: Never   Vaping Use    Vaping status: Never Used   Substance and Sexual Activity    Alcohol use: Yes     Comment: social drinker    Drug use: No    Sexual activity: Yes     Partners: Female, Male     Birth control/protection: Condom Male   Other Topics Concern    None   Social History Narrative    None     Social Drivers of Health     Financial Resource Strain: Not on file   Food Insecurity: Not on file   Transportation Needs: Not on file   Physical Activity: Not on file   Stress: Not on file   Social Connections: Not on file   Intimate Partner Violence: Not on file    Housing Stability: Not on file        Review of Systems   Constitutional: Negative.    HENT: Negative.     Eyes: Negative.    Respiratory: Negative.     Cardiovascular: Negative.    Endocrine: Negative.    Musculoskeletal: Negative.    Neurological: Negative.    Hematological: Negative.    Psychiatric/Behavioral: Negative.           Objective:     Physical Exam  Constitutional:       Appearance: Normal appearance.   HENT:      Head: Normocephalic and atraumatic.      Nose: Nose normal.   Cardiovascular:      Pulses: no weak pulses.           Dorsalis pedis pulses are 2+ on the right side and 2+ on the left side.        Posterior tibial pulses are 2+ on the right side and 2+ on the left side.   Pulmonary:      Effort: Pulmonary effort is normal.   Feet:      Right foot:      Protective Sensation: 7 sites tested.  7 sites sensed.      Skin integrity: Dry skin present. No ulcer, skin breakdown, erythema, warmth or callus.      Left foot:      Protective Sensation: 7 sites tested.  7 sites sensed.      Skin integrity: Dry skin present. No ulcer, skin breakdown, erythema, warmth or callus.      Comments: The patient's clinical examination today significant for some dry scaling skin to the distal aspect of the dorsal forefoot bilaterally.  Some mild dry scaling skin is also noted in the anterior digital spaces bilaterally.  There is no pruritus.  There are no open lesions.  Pedal pulses are palpable.  Muscle power is 5 out of 5 in all directions.  Proprioception and vibratory sensations are intact bilaterally.  Skin:     General: Skin is warm.      Capillary Refill: Capillary refill takes less than 2 seconds.   Neurological:      General: No focal deficit present.      Mental Status: She is alert and oriented to person, place, and time.   Psychiatric:         Mood and Affect: Mood normal.         Behavior: Behavior normal.         Thought Content: Thought content normal.           Diabetic Foot Exam    Patient's shoes  and socks removed.    Right Foot/Ankle   Right Foot Inspection  Skin Exam: skin normal, skin intact and dry skin. No warmth, no callus, no erythema, no maceration, no abnormal color, no pre-ulcer, no ulcer and no callus.     Toe Exam: ROM and strength within normal limits.     Sensory   Proprioception: intact  Monofilament testing: intact    Vascular  Capillary refills: < 3 seconds  The right DP pulse is 2+. The right PT pulse is 2+.     Left Foot/Ankle  Left Foot Inspection  Skin Exam: skin normal, skin intact and dry skin. No warmth, no erythema, no maceration, normal color, no pre-ulcer, no ulcer and no callus.     Toe Exam: ROM and strength within normal limits.     Sensory   Proprioception: intact  Monofilament testing: intact    Vascular  Capillary refills: < 3 seconds  The left DP pulse is 2+. The left PT pulse is 2+.     Assign Risk Category  No deformity present  No loss of protective sensation  No weak pulses  Risk: 0

## 2024-12-17 ENCOUNTER — PROCEDURE VISIT (OUTPATIENT)
Dept: DERMATOLOGY | Facility: CLINIC | Age: 44
End: 2024-12-17

## 2024-12-17 VITALS — WEIGHT: 189 LBS | BODY MASS INDEX: 30.37 KG/M2 | TEMPERATURE: 97.7 F | HEIGHT: 66 IN

## 2024-12-17 DIAGNOSIS — F64.9 GENDER DYSPHORIA: Primary | ICD-10-CM

## 2024-12-17 NOTE — PROGRESS NOTES
"[facility] Note     Patient Name: Jody Pascal  Encounter Date: 12/17/2024     Have you been cared for by a [facility] Dermatologist in the last 3 years and, if so, which description applies to you?    Yes.  I have been here within the last 3 years, and my medical history has NOT changed since that time.  I am FEMALE/of child-bearing potential.    REVIEW OF SYSTEMS:  Have you recently had or currently have any of the following? No changes in my recent health.   PAST MEDICAL HISTORY:  Have you personally ever had or currently have any of the following?  If \"YES,\" then please provide more detail. No changes in my medical history.   HISTORY OF IMMUNOSUPPRESSION: Do you have a history of any of the following:  Systemic Immunosuppression such as Diabetes, Biologic or Immunotherapy, Chemotherapy, Organ Transplantation, Bone Marrow Transplantation or Prednisone?  Diabetes     Answering \"YES\" requires the addition of the dotphrase \"IMMUNOSUPPRESSED\" as the first diagnosis of the patient's visit.   FAMILY HISTORY:  Any \"first degree relatives\" (parent, brother, sister, or child) with the following?    No changes in my family's known health.   PATIENT EXPERIENCE:    Do you want the Dermatologist to perform a COMPLETE skin exam today including a clinical examination under the \"bra and underwear\" areas?  NO  If necessary, do we have your permission to call and leave a detailed message on your Preferred Phone number that includes your specific medical information?  Yes      Allergies   Allergen Reactions    Other Allergic Rhinitis     SEASONAL ALLERGIES AND PET DANDER      Current Outpatient Medications:     acetaminophen-codeine (TYLENOL/CODEINE #3) 300-30 MG per tablet, Take 1 tablet by mouth every 4 (four) hours as needed for moderate pain (or cough), Disp: 15 tablet, Rfl: 0    BD Pen Needle Kaykay 2nd Gen 32G X 4 MM MISC, USE TO INJECT INSULIN 4 TIMES A DAY, Disp: 400 each, Rfl: 0    Continuous Glucose " Sensor (Dexcom G7 Sensor), Use 1 Device every 10 days, Disp: 9 each, Rfl: 3    CVS Glucose 4-6 GM-MG, CHEW 4 TABLETS (16 G TOTAL) AS NEEDED FOR LOW BLOOD SUGAR, Disp: , Rfl:     estradiol (ESTRACE) 2 MG tablet, TAKE 1 TABLET BY MOUTH 2 TIMES A DAY., Disp: 180 tablet, Rfl: 1    famotidine (PEPCID) 40 MG tablet, TAKE 1 TABLET BY MOUTH DAILY AT BEDTIME, Disp: 90 tablet, Rfl: 4    fluticasone (FLONASE) 50 mcg/act nasal spray, SPRAY 2 SPRAYS INTO EACH NOSTRIL EVERY DAY, Disp: 48 mL, Rfl: 4    glucagon (GLUCAGON EMERGENCY) 1 MG injection, Inject 1 mg under the skin once as needed for low blood sugar for up to 1 dose, Disp: 1 kit, Rfl: 0    glucose 4 g chewable tablet, Chew 4 tablets (16 g total) as needed for low blood sugar, Disp: 100 tablet, Rfl: 3    InPen 513-Fzbh-Gbjim ANNABEL, USE INPEN DEVICE TO ADMINISTER HUMALOG INSULIN, Disp: 1 each, Rfl: 0    insulin degludec (Tresiba FlexTouch) 100 units/mL injection pen, Inject 20 Units under the skin daily, Disp: 15 mL, Rfl: 3    Insulin Disposable Pump (Omnipod 5 G7 Pods, Gen 5,) MISC, USE ONE POD EVERY 3 DAYS, Disp: 30 each, Rfl: 3    insulin lispro (HumaLOG KwikPen) 100 units/mL injection pen, Inject 4-10 Units under the skin 3 (three) times a day with meals, Disp: 15 mL, Rfl: 3    Insulin Lispro-aabc (Lyumjev) 100 UNIT/ML SOLN, Inject 300 units in pump every 3 days, Disp: 90 mL, Rfl: 3    ketoconazole (NIZORAL) 2 % cream, Apply topically daily, Disp: 60 g, Rfl: 3    Needles & Syringes MISC, Use 3 (three) times a day If needed separately from formulary insulin pens, Disp: 1 each, Rfl: 4    spironolactone (ALDACTONE) 100 mg tablet, TAKE 1 TABLET BY MOUTH TWICE A DAY, Disp: 180 tablet, Rfl: 1    Tresiba FlexTouch 100 units/mL injection pen, INJECT 30 UNITS UNDER THE SKIN EVERY MORNING, Disp: 30 mL, Rfl: 3    ergocalciferol (VITAMIN D2) 50,000 units, Take 1 capsule (50,000 Units total) by mouth once a week for 12 doses, Disp: 12 capsule, Rfl: 0    predniSONE 20 mg tablet, Take  1 tablet (20 mg total) by mouth daily, Disp: 7 tablet, Rfl: 0    vardenafil (LEVITRA) 20 MG tablet, Take 1 tablet (20 mg total) by mouth daily as needed for erectile dysfunction (Patient not taking: Reported on 10/4/2024), Disp: 10 tablet, Rfl: 0          Whom besides the patient is providing clinical information about today's encounter?   NO ADDITIONAL HISTORIAN (patient alone provided history)    Physical Exam and Assessment/Plan by Diagnosis:      [facility] Laser Treatment  12/17/2024     Device: Kalyn Gentle Max Pro  Place of Treatment: [facility]     Surgeon and :  Dr Michael     Treatment number: 5  Site of treatment: face and neck  Skin type: Lambert II     Pre-operative Diagnosis:   Indications for Surgery:    Post-operative Diagnosis: same as pre-operative     Anesthetic: Victoriano cooler     Safety Precautions:  Fire extinguisher persent/Window covered/Staff and patient eyes covered/Warning sign posted     Interim History/Comments: no adverse effects from previous procedures      Parameters: Laser was set to 755nm     Fluence: 12 J/cm2  Pulse duration: 3 msec  Spot size: 18 mm        Procedure Note:   After discussing potential procedure related risks including but not limited to pain, purpura, blistering, scarring, discoloration, “footprinting,” recurrence, inefficacy, need for additional treatment, and undesirable cosmetic results, written and verbal consent were obtained.  Patient was brought back to the operating room. Time out was performed.  Patient's name, identification and intended procedure were verified. Prior to the procedure, the patient cleansed the treatment area. The treatment area was re-cleansed with alcohol. Eye coverings eye shield inserted/placed.      The cooling device was set to 40 msec msec pre / 40 msec delay/      Tolerance: Patient tolerated the procedure well with no immediate significant complications and left in stable condition.   Complications: none  Other  procedures: none  Photography: no     Post-op Care: Aftercare was discussed. Continue to ice at home and keep the area moist with a gentle moisturizer. Advised the patient to avoid exercise for the next 24 hours and also to be cautious with sun exposure over the next week. Advised patient to call the office if there is excessive swelling.   Follow-up:  Patient is aware that it will take multiple treatments to treat this condition. Return to clinic for re-treatment in 4 weeks.      BILL INSURANCE FOR CPT CODES 68870, [id number].        Scribe Attestation      I,:  Madisyn Vernon am acting as a scribe while in the presence of the attending physician.:       I,:  Soren Michael MD personally performed the services described in this documentation    as scribed in my presence.:

## 2024-12-17 NOTE — PATIENT INSTRUCTIONS
Post-op Care: Aftercare was discussed. Continue to ice at home and keep the area moist with a gentle moisturizer. Advised the patient to avoid exercise for the next 24 hours and also to be cautious with sun exposure over the next week. Advised patient to call the office if there is excessive swelling.   Follow-up:  Patient is aware that it will take multiple treatments to treat this condition. Return to clinic for re-treatment in 4 weeks.

## 2025-01-15 ENCOUNTER — APPOINTMENT (OUTPATIENT)
Dept: LAB | Facility: CLINIC | Age: 45
End: 2025-01-15
Payer: COMMERCIAL

## 2025-01-15 DIAGNOSIS — E10.65 TYPE 1 DIABETES MELLITUS WITH HYPERGLYCEMIA (HCC): ICD-10-CM

## 2025-01-15 DIAGNOSIS — R49.0 DYSPHONIA: ICD-10-CM

## 2025-01-15 DIAGNOSIS — J38.3 GLOTTIC INSUFFICIENCY: ICD-10-CM

## 2025-01-15 LAB
ANION GAP SERPL CALCULATED.3IONS-SCNC: 9 MMOL/L (ref 4–13)
BUN SERPL-MCNC: 12 MG/DL (ref 5–25)
CALCIUM SERPL-MCNC: 9.6 MG/DL (ref 8.4–10.2)
CHLORIDE SERPL-SCNC: 101 MMOL/L (ref 96–108)
CO2 SERPL-SCNC: 27 MMOL/L (ref 21–32)
CREAT SERPL-MCNC: 0.85 MG/DL (ref 0.6–1.3)
EST. AVERAGE GLUCOSE BLD GHB EST-MCNC: 171 MG/DL
GLUCOSE P FAST SERPL-MCNC: 142 MG/DL (ref 65–99)
HBA1C MFR BLD: 7.6 %
POTASSIUM SERPL-SCNC: 4.6 MMOL/L (ref 3.5–5.3)
SODIUM SERPL-SCNC: 137 MMOL/L (ref 135–147)

## 2025-01-15 PROCEDURE — 80048 BASIC METABOLIC PNL TOTAL CA: CPT

## 2025-01-15 PROCEDURE — 83036 HEMOGLOBIN GLYCOSYLATED A1C: CPT

## 2025-01-15 PROCEDURE — 36415 COLL VENOUS BLD VENIPUNCTURE: CPT

## 2025-01-16 ENCOUNTER — RESULTS FOLLOW-UP (OUTPATIENT)
Dept: ENDOCRINOLOGY | Facility: CLINIC | Age: 45
End: 2025-01-16

## 2025-01-17 ENCOUNTER — OFFICE VISIT (OUTPATIENT)
Dept: ENDOCRINOLOGY | Facility: CLINIC | Age: 45
End: 2025-01-17
Payer: COMMERCIAL

## 2025-01-17 VITALS
HEART RATE: 78 BPM | DIASTOLIC BLOOD PRESSURE: 80 MMHG | WEIGHT: 193.6 LBS | OXYGEN SATURATION: 98 % | HEIGHT: 66 IN | SYSTOLIC BLOOD PRESSURE: 120 MMHG | BODY MASS INDEX: 31.12 KG/M2

## 2025-01-17 DIAGNOSIS — E13.9 LADA (LATENT AUTOIMMUNE DIABETES IN ADULTS), MANAGED AS TYPE 1 (HCC): ICD-10-CM

## 2025-01-17 DIAGNOSIS — Z96.41 INSULIN PUMP IN PLACE: ICD-10-CM

## 2025-01-17 DIAGNOSIS — E10.65 TYPE 1 DIABETES MELLITUS WITH HYPERGLYCEMIA (HCC): Primary | ICD-10-CM

## 2025-01-17 DIAGNOSIS — E10.3293 MILD NONPROLIFERATIVE DIABETIC RETINOPATHY OF BOTH EYES WITHOUT MACULAR EDEMA ASSOCIATED WITH TYPE 1 DIABETES MELLITUS (HCC): ICD-10-CM

## 2025-01-17 DIAGNOSIS — F64.0 TRANSGENDER WOMAN ON HORMONE THERAPY: ICD-10-CM

## 2025-01-17 DIAGNOSIS — Z79.899 TRANSGENDER WOMAN ON HORMONE THERAPY: ICD-10-CM

## 2025-01-17 DIAGNOSIS — E55.9 VITAMIN D DEFICIENCY: ICD-10-CM

## 2025-01-17 PROCEDURE — 95251 CONT GLUC MNTR ANALYSIS I&R: CPT | Performed by: INTERNAL MEDICINE

## 2025-01-17 PROCEDURE — 99214 OFFICE O/P EST MOD 30 MIN: CPT | Performed by: INTERNAL MEDICINE

## 2025-01-17 NOTE — PROGRESS NOTES
Jody Pascal 44 y.o. adult MRN: [id number]    Encounter: [id number]      Assessment & Plan     Assessment:  This is a 44 y.o.-year-old adult with diabetes with hyperglycemia and gender affirming hormone therapy    #T1 DM with hyperglycemia, SIA  Last visit 10/04/24  No active complains today including polydipsia, polyuria, polyphagia  Pt was diagnosed in 2018, pt's dad was diagnosed with T2 DM and was on insulin  Appetite is good, weight up by 9 lb since the last visit  Pt admits some diet indiscretion during the holidays  Most recent A1C with slight uptrend to 7.6  Pt is using OmniPod 5 with DexCom6  PPM, CGM uploaded  Mode automated 92 %  Basal range 0.9 (max 1.4)  Changes made today: ICR 10pm-7 am 1:10,7 am -6 pm 1:8.5  ISF 50  Correction threshold 180  Target glucose  110  Active insulin time 3.5 h  Summary of pt's DexCom upload:   Average glucose in 170s  Very high 13 %  High - 22%  In Range 65%  Low 0 %  Very low 0 %  Diet: was affected by holidays   Exercise daily running about 1 mile however not during the holidays  Last seen by opt Sept 2024 Retinopathy b/l  Last seen by podiatry was several months ago, no issus  Hypoglycemia once or twice a month, low 50s, any times of the day or night  A lot of insulin pump pauses noticed due to low glucose however pt mentioned that he back to her routine after the holidays and would like to hold of on changing her basal rate at this time. ICR after breakfast adjusted  Plan:  Follow blood work: Hgb A1C  Changes made today ICR:   10 pm -7 am 1:10  7 am - 6 pm 1:8.5  Stay compliant with your diet and carbs count and recording into your pump  Stay compliant with diet  Stay compliant with exercises  Manage hypoglycemia if any with juice, glucose tablet or glucagon if needed        # 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 on Gender affirming hormone therapy  Pt has no complains today  Current regimen:   Estradiol 2 mg BID  Spironolactone 100 mg BID  S/p voice feminizing surgery 7/5,  second round planned for march  Admits less Hair growth  Denies Mood swings  Plan:  Repeat blood work: estradiol and testosterone  C/w Estradiol 2 mg BID  C/w Spironolactone 100 mg BID    # H/o Vit D deficiency  Currently is only on OTC vitamins, not sure about the dose  Repeat Vit D level        CC: Diabetes    History of Present Illness     HPI:   A 45 yo male to 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 with PMH of Type 1 DM, SIA, obesity, on hormone affirming therapy who is presents for follow up. Pt was diagnosed in 2018 with SIA type 1 DM, currently is on insulin pump and using omni pod with DexCom 6.    Last visit Oct 4th 2024    Today: pt has no active complains, she denies significant thirst, polydipsia, polyuria, polyphagia. Pt informs that she didn't follow her diet as she suppose to and didn't exercise during the holiday season however reports that she got on track now. Pt reports several episodes of low glucose in 50s managed with juice. Pt denies any side effects from her hormonal therapy. She informs that the second round  of her voice feminizing surgery is scheduled for March.         Review of Systems   Constitutional:  Negative for chills and fever.   HENT:  Negative for ear pain and sore throat.    Eyes:  Negative for pain and visual disturbance.   Respiratory:  Negative for cough and shortness of breath.    Cardiovascular:  Negative for chest pain and palpitations.   Gastrointestinal:  Negative for abdominal pain and vomiting.   Genitourinary:  Negative for dysuria and hematuria.   Musculoskeletal:  Negative for arthralgias and back pain.   Skin:  Negative for color change and rash.   Neurological:  Negative for seizures and syncope.   All other systems reviewed and are negative.      Historical Information   Past Medical History:   Diagnosis Date    Allergic     Diabetes mellitus (HCC) 10/25/2019    Former smoker 1/25/2019     Past Surgical History:   Procedure Laterality Date    NM LARGSC W/NJX VOCAL CORD THER  W/MICRO/TELESCOPE Bilateral 2024    Procedure: micro direct laryngoscopy, glotto plasty, vocal fold injection;  Surgeon: Graham Wiggins MD;  Location: [facility];  Service: ENT    VOCAL CORD INJECTION N/A 2024    Procedure: MICROLARYNGOSCOPY FOR VOCAL CORD STRIPPING WITH INJECTION;  Surgeon: Graham Wiggins MD;  Location: [facility];  Service: ENT     Social History   Social History     Substance and Sexual Activity   Alcohol Use Yes    Comment: social drinker     Social History     Substance and Sexual Activity   Drug Use No     Social History     Tobacco Use   Smoking Status Former    Current packs/day: 0.00    Average packs/day: 0.5 packs/day for 20.2 years (10.1 ttl pk-yrs)    Types: Cigarettes    Start date: 1998    Quit date: 11/15/2018    Years since quittin.1   Smokeless Tobacco Never     Family History:   Family History   Problem Relation Age of Onset    Hypertension Mother     Diabetes Mother     Diabetes Father     Dementia Father     Substance Abuse Brother        Meds/Allergies   Current Outpatient Medications   Medication Sig Dispense Refill    BD Pen Needle Kaykay 2nd Gen 32G X 4 MM MISC USE TO INJECT INSULIN 4 TIMES A  each 0    Continuous Glucose Sensor (Dexcom G7 Sensor) Use 1 Device every 10 days 9 each 3    estradiol (ESTRACE) 2 MG tablet TAKE 1 TABLET BY MOUTH 2 TIMES A DAY. 180 tablet 1    Insulin Disposable Pump (Omnipod 5 G7 Pods, Gen 5,) MISC USE ONE POD EVERY 3 DAYS 30 each 3    Insulin Lispro-aabc (Lyumjev) 100 UNIT/ML SOLN Inject 300 units in pump every 3 days 90 mL 3    spironolactone (ALDACTONE) 100 mg tablet TAKE 1 TABLET BY MOUTH TWICE A  tablet 1    CVS Glucose 4-6 GM-MG CHEW 4 TABLETS (16 G TOTAL) AS NEEDED FOR LOW BLOOD SUGAR      famotidine (PEPCID) 40 MG tablet TAKE 1 TABLET BY MOUTH DAILY AT BEDTIME 90 tablet 4    fluticasone (FLONASE) 50 mcg/act nasal spray SPRAY 2 SPRAYS INTO EACH NOSTRIL EVERY DAY 48 mL 4    glucagon (GLUCAGON EMERGENCY) 1 MG  "injection Inject 1 mg under the skin once as needed for low blood sugar for up to 1 dose 1 kit 0    glucose 4 g chewable tablet Chew 4 tablets (16 g total) as needed for low blood sugar 100 tablet 3    InPen 100-Blue-Milagros ANNABEL USE INPEN DEVICE TO ADMINISTER HUMALOG INSULIN 1 each 0    insulin lispro (HumaLOG KwikPen) 100 units/mL injection pen Inject 4-10 Units under the skin 3 (three) times a day with meals 15 mL 3    ketoconazole (NIZORAL) 2 % cream Apply topically daily 60 g 3    Needles & Syringes MISC Use 3 (three) times a day If needed separately from formulary insulin pens 1 each 4    Tresiba FlexTouch 100 units/mL injection pen INJECT 30 UNITS UNDER THE SKIN EVERY MORNING 30 mL 3    vardenafil (LEVITRA) 20 MG tablet Take 1 tablet (20 mg total) by mouth daily as needed for erectile dysfunction (Patient not taking: Reported on 10/4/2024) 10 tablet 0     No current facility-administered medications for this visit.     Allergies   Allergen Reactions    Other Allergic Rhinitis     SEASONAL ALLERGIES AND PET DANDER       Objective   Vitals: Blood pressure 120/80, pulse 78, height 5' 6\" (1.676 m), weight 87.8 kg (193 lb 9.6 oz), SpO2 98%.    Physical Exam  Constitutional:       Appearance: Normal appearance. She is obese. She is not ill-appearing.      Comments: Male 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   HENT:      Head: Normocephalic and atraumatic.      Nose: Nose normal.      Mouth/Throat:      Mouth: Mucous membranes are moist.   Eyes:      Conjunctiva/sclera: Conjunctivae normal.   Cardiovascular:      Rate and Rhythm: Normal rate and regular rhythm.      Pulses: Normal pulses.      Heart sounds: Normal heart sounds.   Pulmonary:      Effort: Pulmonary effort is normal. No respiratory distress.      Breath sounds: No wheezing or rales.   Abdominal:      General: Abdomen is flat. Bowel sounds are normal. There is no distension.      Palpations: Abdomen is soft.      Tenderness: There is no abdominal tenderness. There is no " guarding.   Musculoskeletal:      Cervical back: Normal range of motion.      Right lower leg: No edema.      Left lower leg: No edema.   Skin:     General: Skin is warm and dry.      Comments: Multiple tattoos, no signs of inflammation    Neurological:      General: No focal deficit present.      Mental Status: She is alert.   Psychiatric:         Mood and Affect: Mood normal.         Behavior: Behavior normal.         Thought Content: Thought content normal.         Judgment: Judgment normal.         The history was obtained from the review of the chart, patient.    Lab Results:   Lab Results   Component Value Date/Time    Hemoglobin A1C 7.6 (H) 01/15/2025 07:19 AM    Hemoglobin A1C 7.3 (H) 09/30/2024 08:37 AM    Hemoglobin A1C 7.9 (H) 07/01/2024 09:17 AM    WBC 6.28 07/01/2024 09:17 AM    Hemoglobin 13.0 07/01/2024 09:17 AM    Hematocrit 39.6 07/01/2024 09:17 AM    MCV 93 07/01/2024 09:17 AM    Platelets 326 07/01/2024 09:17 AM    BUN 12 01/15/2025 07:19 AM    BUN 8 09/30/2024 08:37 AM    BUN 8 07/01/2024 09:17 AM    Potassium 4.6 01/15/2025 07:19 AM    Potassium 4.1 09/30/2024 08:37 AM    Potassium 4.5 07/01/2024 09:17 AM    Chloride 101 01/15/2025 07:19 AM    Chloride 99 09/30/2024 08:37 AM    Chloride 103 07/01/2024 09:17 AM    CO2 27 01/15/2025 07:19 AM    CO2 28 09/30/2024 08:37 AM    CO2 24 07/01/2024 09:17 AM    Creatinine 0.85 01/15/2025 07:19 AM    Creatinine 0.75 09/30/2024 08:37 AM    Creatinine 0.74 07/01/2024 09:17 AM    AST 14 09/30/2024 08:37 AM    AST 18 07/01/2024 09:17 AM    ALT 14 09/30/2024 08:37 AM    ALT 15 07/01/2024 09:17 AM    Total Protein 7.1 09/30/2024 08:37 AM    Total Protein 7.1 07/01/2024 09:17 AM    Albumin 3.9 09/30/2024 08:37 AM    Albumin 3.9 07/01/2024 09:17 AM    HDL, Direct 74 09/30/2024 08:37 AM    HDL, Direct 55 07/01/2024 09:17 AM    Triglycerides 196 (H) 09/30/2024 08:37 AM    Triglycerides 149 07/01/2024 09:17 AM           Imaging Studies: Results Review Statement: No  "pertinent imaging studies reviewed.    Portions of the record may have been created with voice recognition software. Occasional wrong word or \"sound a like\" substitutions may have occurred due to the inherent limitations of voice recognition software. Read the chart carefully and recognize, using context, where substitutions have occurred.    "

## 2025-01-21 ENCOUNTER — PROCEDURE VISIT (OUTPATIENT)
Dept: DERMATOLOGY | Facility: CLINIC | Age: 45
End: 2025-01-21

## 2025-01-21 VITALS — HEIGHT: 66 IN | TEMPERATURE: 97.7 F | WEIGHT: 193.6 LBS | BODY MASS INDEX: 31.12 KG/M2

## 2025-01-21 DIAGNOSIS — F64.9 GENDER DYSPHORIA: Primary | ICD-10-CM

## 2025-01-21 NOTE — PROGRESS NOTES
"[facility] Dermatology Clinic Note     Patient Name: Jody Pascal  Encounter Date: 1/21/2025     Have you been cared for by a [facility] Dermatologist in the last 3 years and, if so, which description applies to you?    Yes.  I have been here within the last 3 years, and my medical history has NOT changed since that time.  I am FEMALE/of child-bearing potential.    REVIEW OF SYSTEMS:  Have you recently had or currently have any of the following? No changes in my recent health.   PAST MEDICAL HISTORY:  Have you personally ever had or currently have any of the following?  If \"YES,\" then please provide more detail. No changes in my medical history.   HISTORY OF IMMUNOSUPPRESSION: Do you have a history of any of the following:  Systemic Immunosuppression such as Diabetes, Biologic or Immunotherapy, Chemotherapy, Organ Transplantation, Bone Marrow Transplantation or Prednisone?  YES, Diabetic     Answering \"YES\" requires the addition of the dotphrase \"IMMUNOSUPPRESSED\" as the first diagnosis of the patient's visit.   FAMILY HISTORY:  Any \"first degree relatives\" (parent, brother, sister, or child) with the following?    No changes in my family's known health.   PATIENT EXPERIENCE:    Do you want the Dermatologist to perform a COMPLETE skin exam today including a clinical examination under the \"bra and underwear\" areas?  NO  If necessary, do we have your permission to call and leave a detailed message on your Preferred Phone number that includes your specific medical information?  Yes      Allergies   Allergen Reactions    Other Allergic Rhinitis     SEASONAL ALLERGIES AND PET DANDER      Current Outpatient Medications:     BD Pen Needle Kaykay 2nd Gen 32G X 4 MM MISC, USE TO INJECT INSULIN 4 TIMES A DAY, Disp: 400 each, Rfl: 0    Continuous Glucose Sensor (Dexcom G7 Sensor), Use 1 Device every 10 days, Disp: 9 each, Rfl: 3    CVS Glucose 4-6 GM-MG, CHEW 4 TABLETS (16 G TOTAL) AS NEEDED FOR LOW BLOOD SUGAR, Disp: , Rfl:    "  estradiol (ESTRACE) 2 MG tablet, TAKE 1 TABLET BY MOUTH 2 TIMES A DAY., Disp: 180 tablet, Rfl: 1    famotidine (PEPCID) 40 MG tablet, TAKE 1 TABLET BY MOUTH DAILY AT BEDTIME, Disp: 90 tablet, Rfl: 4    fluticasone (FLONASE) 50 mcg/act nasal spray, SPRAY 2 SPRAYS INTO EACH NOSTRIL EVERY DAY, Disp: 48 mL, Rfl: 4    glucagon (GLUCAGON EMERGENCY) 1 MG injection, Inject 1 mg under the skin once as needed for low blood sugar for up to 1 dose, Disp: 1 kit, Rfl: 0    glucose 4 g chewable tablet, Chew 4 tablets (16 g total) as needed for low blood sugar, Disp: 100 tablet, Rfl: 3    InPen 827-Xmjp-Fgqum ANNABEL, USE INPEN DEVICE TO ADMINISTER HUMALOG INSULIN, Disp: 1 each, Rfl: 0    Insulin Disposable Pump (Omnipod 5 G7 Pods, Gen 5,) MISC, USE ONE POD EVERY 3 DAYS, Disp: 30 each, Rfl: 3    insulin lispro (HumaLOG KwikPen) 100 units/mL injection pen, Inject 4-10 Units under the skin 3 (three) times a day with meals, Disp: 15 mL, Rfl: 3    Insulin Lispro-aabc (Lyumjev) 100 UNIT/ML SOLN, Inject 300 units in pump every 3 days, Disp: 90 mL, Rfl: 3    ketoconazole (NIZORAL) 2 % cream, Apply topically daily, Disp: 60 g, Rfl: 3    Needles & Syringes MISC, Use 3 (three) times a day If needed separately from formulary insulin pens, Disp: 1 each, Rfl: 4    spironolactone (ALDACTONE) 100 mg tablet, TAKE 1 TABLET BY MOUTH TWICE A DAY, Disp: 180 tablet, Rfl: 1    Tresiba FlexTouch 100 units/mL injection pen, INJECT 30 UNITS UNDER THE SKIN EVERY MORNING, Disp: 30 mL, Rfl: 3    vardenafil (LEVITRA) 20 MG tablet, Take 1 tablet (20 mg total) by mouth daily as needed for erectile dysfunction, Disp: 10 tablet, Rfl: 0          Whom besides the patient is providing clinical information about today's encounter?   NO ADDITIONAL HISTORIAN (patient alone provided history)    Physical Exam and Assessment/Plan by Diagnosis:        Alexandrite Laser Treatment  1/21/2025     Device: Kalyn Gentle Max Pro  Place of Treatment: [place]     Surgeon and Safety  Officer:  Dr Michael     Treatment number: 6  Site of treatment: face and neck  Skin type: Lambert II     Pre-operative Diagnosis:   Indications for Surgery:    Post-operative Diagnosis: same as pre-operative     Anesthetic: Victoriano cooler     Safety Precautions:  Fire extinguisher persent/Window covered/Staff and patient eyes covered/Warning sign posted     Interim History/Comments: no adverse effects from previous procedures      Parameters: Laser was set to 755nm     Fluence: 26 J/cm2  Pulse duration: 3 msec  Spot size: 12 mm        Procedure Note:   After discussing potential procedure related risks including but not limited to pain, purpura, blistering, scarring, discoloration, “footprinting,” recurrence, inefficacy, need for additional treatment, and undesirable cosmetic results, written and verbal consent were obtained.  Patient was brought back to the operating room. Time out was performed.  Patient's name, identification and intended procedure were verified. Prior to the procedure, the patient cleansed the treatment area. The treatment area was re-cleansed with alcohol. Eye coverings eye shield inserted/placed.      The cooling device was set to 40 msec msec pre / 40 msec delay/      Tolerance: Patient tolerated the procedure well with no immediate significant complications and left in stable condition.   Complications: none  Other procedures: none  Photography: no     Post-op Care: Aftercare was discussed. Continue to ice at home and keep the area moist with a gentle moisturizer. Advised the patient to avoid exercise for the next 24 hours and also to be cautious with sun exposure over the next week. Advised patient to call the office if there is excessive swelling.   Follow-up:  Patient is aware that it will take multiple treatments to treat this condition. Return to clinic for re-treatment in 4 weeks.      BILL INSURANCE FOR CPT CODES 32639, 91741.

## 2025-03-04 ENCOUNTER — PROCEDURE VISIT (OUTPATIENT)
Dept: DERMATOLOGY | Facility: CLINIC | Age: 45
End: 2025-03-04

## 2025-03-04 DIAGNOSIS — F64.9 GENDER DYSPHORIA: Primary | ICD-10-CM

## 2025-03-04 NOTE — PROGRESS NOTES
"[facility] Dermatology Clinic Note     Patient Name: Jody Pascal  Encounter Date: 3/4/2025     Have you been cared for by a [facility] Dermatologist in the last 3 years and, if so, which description applies to you?     Yes.  I have been here within the last 3 years, and my medical history has NOT changed since that time.  I am FEMALE/of child-bearing potential.    REVIEW OF SYSTEMS:  Have you recently had or currently have any of the following? No changes in my recent health.   PAST MEDICAL HISTORY:  Have you personally ever had or currently have any of the following?  If \"YES,\" then please provide more detail. No changes in my medical history.   HISTORY OF IMMUNOSUPPRESSION: Do you have a history of any of the following:  Systemic Immunosuppression such as Diabetes, Biologic or Immunotherapy, Chemotherapy, Organ Transplantation, Bone Marrow Transplantation or Prednisone?  YES, Diabetic      Answering \"YES\" requires the addition of the dotphrase \"IMMUNOSUPPRESSED\" as the first diagnosis of the patient's visit.   FAMILY HISTORY:  Any \"first degree relatives\" (parent, brother, sister, or child) with the following?    No changes in my family's known health.   PATIENT EXPERIENCE:    Do you want the Dermatologist to perform a COMPLETE skin exam today including a clinical examination under the \"bra and underwear\" areas?  NO  If necessary, do we have your permission to call and leave a detailed message on your Preferred Phone number that includes your specific medical information?  Yes      Allergies         Allergies   Allergen Reactions    Other Allergic Rhinitis       SEASONAL ALLERGIES AND PET DANDER         Current Medications      Current Outpatient Medications:     BD Pen Needle Kaykay 2nd Gen 32G X 4 MM MISC, USE TO INJECT INSULIN 4 TIMES A DAY, Disp: 400 each, Rfl: 0    Continuous Glucose Sensor (Dexcom G7 Sensor), Use 1 Device every 10 days, Disp: 9 each, Rfl: 3    CVS Glucose 4-6 GM-MG, CHEW 4 TABLETS (16 G " TOTAL) AS NEEDED FOR LOW BLOOD SUGAR, Disp: , Rfl:     estradiol (ESTRACE) 2 MG tablet, TAKE 1 TABLET BY MOUTH 2 TIMES A DAY., Disp: 180 tablet, Rfl: 1    famotidine (PEPCID) 40 MG tablet, TAKE 1 TABLET BY MOUTH DAILY AT BEDTIME, Disp: 90 tablet, Rfl: 4    fluticasone (FLONASE) 50 mcg/act nasal spray, SPRAY 2 SPRAYS INTO EACH NOSTRIL EVERY DAY, Disp: 48 mL, Rfl: 4    glucagon (GLUCAGON EMERGENCY) 1 MG injection, Inject 1 mg under the skin once as needed for low blood sugar for up to 1 dose, Disp: 1 kit, Rfl: 0    glucose 4 g chewable tablet, Chew 4 tablets (16 g total) as needed for low blood sugar, Disp: 100 tablet, Rfl: 3    InPen 319-Hvag-Urcmt ANNABEL, USE INPEN DEVICE TO ADMINISTER HUMALOG INSULIN, Disp: 1 each, Rfl: 0    Insulin Disposable Pump (Omnipod 5 G7 Pods, Gen 5,) MISC, USE ONE POD EVERY 3 DAYS, Disp: 30 each, Rfl: 3    insulin lispro (HumaLOG KwikPen) 100 units/mL injection pen, Inject 4-10 Units under the skin 3 (three) times a day with meals, Disp: 15 mL, Rfl: 3    Insulin Lispro-aabc (Lyumjev) 100 UNIT/ML SOLN, Inject 300 units in pump every 3 days, Disp: 90 mL, Rfl: 3    ketoconazole (NIZORAL) 2 % cream, Apply topically daily, Disp: 60 g, Rfl: 3    Needles & Syringes MISC, Use 3 (three) times a day If needed separately from formulary insulin pens, Disp: 1 each, Rfl: 4    spironolactone (ALDACTONE) 100 mg tablet, TAKE 1 TABLET BY MOUTH TWICE A DAY, Disp: 180 tablet, Rfl: 1    Tresiba FlexTouch 100 units/mL injection pen, INJECT 30 UNITS UNDER THE SKIN EVERY MORNING, Disp: 30 mL, Rfl: 3    vardenafil (LEVITRA) 20 MG tablet, Take 1 tablet (20 mg total) by mouth daily as needed for erectile dysfunction, Disp: 10 tablet, Rfl: 0               Whom besides the patient is providing clinical information about today's encounter?   NO ADDITIONAL HISTORIAN (patient alone provided history)     Physical Exam and Assessment/Plan by Diagnosis:          Alexandrite Laser Treatment     Device: Kalyn Gentle Max  Pro  Place of Treatment: [place]     Surgeon and :  Dr Michael     Treatment number: 7  Site of treatment: face and neck  Skin type: Lambert II     Pre-operative Diagnosis:   Indications for Surgery:    Post-operative Diagnosis: same as pre-operative     Anesthetic: Victoriano cooler     Safety Precautions:  Fire extinguisher persent/Window covered/Staff and patient eyes covered/Warning sign posted     Interim History/Comments: no adverse effects from previous procedures      Parameters: Laser was set to 755nm     Fluence: 28 J/cm2  Pulse duration: 3 msec  Spot size: 12 mm        Procedure Note:   After discussing potential procedure related risks including but not limited to pain, purpura, blistering, scarring, discoloration, “footprinting,” recurrence, inefficacy, need for additional treatment, and undesirable cosmetic results, written and verbal consent were obtained.  Patient was brought back to the operating room. Time out was performed.  Patient's name, identification and intended procedure were verified. Prior to the procedure, the patient cleansed the treatment area. The treatment area was re-cleansed with alcohol. Eye coverings eye shield inserted/placed.      The cooling device was set to 40 msec msec pre / 40 msec delay/      Tolerance: Patient tolerated the procedure well with no immediate significant complications and left in stable condition.   Complications: none  Other procedures: none  Photography: no     Post-op Care: Aftercare was discussed. Continue to ice at home and keep the area moist with a gentle moisturizer. Advised the patient to avoid exercise for the next 24 hours and also to be cautious with sun exposure over the next week. Advised patient to call the office if there is excessive swelling.   Follow-up:  Patient is aware that it will take multiple treatments to treat this condition. Return to clinic for re-treatment in 4 weeks.      BILL INSURANCE FOR CPT CODES 52174,  [id number].

## 2025-04-01 ENCOUNTER — PROCEDURE VISIT (OUTPATIENT)
Dept: DERMATOLOGY | Facility: CLINIC | Age: 45
End: 2025-04-01

## 2025-04-01 VITALS — BODY MASS INDEX: 31.15 KG/M2 | TEMPERATURE: 97.8 F | WEIGHT: 193 LBS

## 2025-04-01 DIAGNOSIS — F64.9 GENDER DYSPHORIA: Primary | ICD-10-CM

## 2025-04-01 NOTE — PROGRESS NOTES
"[facility] Dermatology Clinic Note     Patient Name: Jody Pascal  Encounter Date: 4/1/2025     Have you been cared for by a [facility] Dermatologist in the last 3 years and, if so, which description applies to you?    Yes.  I have been here within the last 3 years, and my medical history has NOT changed since that time.  I am FEMALE/of child-bearing potential.    REVIEW OF SYSTEMS:  Have you recently had or currently have any of the following? No changes in my recent health.   PAST MEDICAL HISTORY:  Have you personally ever had or currently have any of the following?  If \"YES,\" then please provide more detail. No changes in my medical history.   HISTORY OF IMMUNOSUPPRESSION: Do you have a history of any of the following:  Systemic Immunosuppression such as Diabetes, Biologic or Immunotherapy, Chemotherapy, Organ Transplantation, Bone Marrow Transplantation or Prednisone?  No     Answering \"YES\" requires the addition of the dotphrase \"IMMUNOSUPPRESSED\" as the first diagnosis of the patient's visit.   FAMILY HISTORY:  Any \"first degree relatives\" (parent, brother, sister, or child) with the following?    No changes in my family's known health.   PATIENT EXPERIENCE:    Do you want the Dermatologist to perform a COMPLETE skin exam today including a clinical examination under the \"bra and underwear\" areas?  NO  If necessary, do we have your permission to call and leave a detailed message on your Preferred Phone number that includes your specific medical information?  Yes      Allergies   Allergen Reactions    Other Allergic Rhinitis     SEASONAL ALLERGIES AND PET DANDER      Current Outpatient Medications:     BD Pen Needle Kaykay 2nd Gen 32G X 4 MM MISC, USE TO INJECT INSULIN 4 TIMES A DAY, Disp: 400 each, Rfl: 0    Continuous Glucose Sensor (Dexcom G7 Sensor), Use 1 Device every 10 days, Disp: 9 each, Rfl: 3    CVS Glucose 4-6 GM-MG, CHEW 4 TABLETS (16 G TOTAL) AS NEEDED FOR LOW BLOOD SUGAR, Disp: , Rfl:     estradiol " (ESTRACE) 2 MG tablet, TAKE 1 TABLET BY MOUTH 2 TIMES A DAY., Disp: 180 tablet, Rfl: 1    famotidine (PEPCID) 40 MG tablet, TAKE 1 TABLET BY MOUTH DAILY AT BEDTIME, Disp: 90 tablet, Rfl: 4    fluticasone (FLONASE) 50 mcg/act nasal spray, SPRAY 2 SPRAYS INTO EACH NOSTRIL EVERY DAY, Disp: 48 mL, Rfl: 4    glucagon (GLUCAGON EMERGENCY) 1 MG injection, Inject 1 mg under the skin once as needed for low blood sugar for up to 1 dose, Disp: 1 kit, Rfl: 0    glucose 4 g chewable tablet, Chew 4 tablets (16 g total) as needed for low blood sugar, Disp: 100 tablet, Rfl: 3    InPen 908-Ksgp-Lpvdy ANNABEL, USE INPEN DEVICE TO ADMINISTER HUMALOG INSULIN, Disp: 1 each, Rfl: 0    Insulin Disposable Pump (Omnipod 5 G7 Pods, Gen 5,) MISC, USE ONE POD EVERY 3 DAYS, Disp: 30 each, Rfl: 3    insulin lispro (HumaLOG KwikPen) 100 units/mL injection pen, Inject 4-10 Units under the skin 3 (three) times a day with meals, Disp: 15 mL, Rfl: 3    Insulin Lispro-aabc (Lyumjev) 100 UNIT/ML SOLN, Inject 300 units in pump every 3 days, Disp: 90 mL, Rfl: 3    ketoconazole (NIZORAL) 2 % cream, Apply topically daily, Disp: 60 g, Rfl: 3    Needles & Syringes MISC, Use 3 (three) times a day If needed separately from formulary insulin pens, Disp: 1 each, Rfl: 4    spironolactone (ALDACTONE) 100 mg tablet, TAKE 1 TABLET BY MOUTH TWICE A DAY, Disp: 180 tablet, Rfl: 1    Tresiba FlexTouch 100 units/mL injection pen, INJECT 30 UNITS UNDER THE SKIN EVERY MORNING, Disp: 30 mL, Rfl: 3    vardenafil (LEVITRA) 20 MG tablet, Take 1 tablet (20 mg total) by mouth daily as needed for erectile dysfunction, Disp: 10 tablet, Rfl: 0          Whom besides the patient is providing clinical information about today's encounter?   NO ADDITIONAL HISTORIAN (patient alone provided history)    Physical Exam and Assessment/Plan by Diagnosis:        [facility] Laser Treatment     Device: Kalyn Gentle Max Pro  Place of Treatment: [facility]     Surgeon and :  Dr Michael      Treatment number: 8  Site of treatment: face and neck  Skin type: Lambert II     Pre-operative Diagnosis:   Indications for Surgery:    Post-operative Diagnosis: same as pre-operative     Anesthetic: Victoriano cooler     Safety Precautions:  Fire extinguisher persent/Window covered/Staff and patient eyes covered/Warning sign posted     Interim History/Comments: no adverse effects from previous procedures      Parameters: Laser was set to 755nm     Fluence: 30 J/cm2  Pulse duration: 3 msec  Spot size: 12 mm        Procedure Note:   After discussing potential procedure related risks including but not limited to pain, purpura, blistering, scarring, discoloration, “footprinting,” recurrence, inefficacy, need for additional treatment, and undesirable cosmetic results, written and verbal consent were obtained.  Patient was brought back to the operating room. Time out was performed.  Patient's name, identification and intended procedure were verified. Prior to the procedure, the patient cleansed the treatment area. The treatment area was re-cleansed with alcohol. Eye coverings eye shield inserted/placed.      The cooling device was set to 40 msec msec pre / 40 msec delay/      Tolerance: Patient tolerated the procedure well with no immediate significant complications and left in stable condition.   Complications: none  Other procedures: none  Photography: no     Post-op Care: Aftercare was discussed. Continue to ice at home and keep the area moist with a gentle moisturizer. Advised the patient to avoid exercise for the next 24 hours and also to be cautious with sun exposure over the next week. Advised patient to call the office if there is excessive swelling.   Follow-up:  Patient is aware that it will take multiple treatments to treat this condition. Return to clinic for re-treatment in 4 weeks.      BILL INSURANCE FOR CPT CODES 60523, 27655.     Scribe Attestation      I,:  Madisyn Vernon am acting as a scribe while  in the presence of the attending physician.:       I,:  Soren Michael MD personally performed the services described in this documentation    as scribed in my presence.:

## 2025-04-03 DIAGNOSIS — F64.9 GENDER DYSPHORIA: ICD-10-CM

## 2025-04-03 DIAGNOSIS — Z78.9 TRANSGENDER: ICD-10-CM

## 2025-04-03 RX ORDER — ESTRADIOL 2 MG/1
2 TABLET ORAL 2 TIMES DAILY
Qty: 180 TABLET | Refills: 1 | Status: SHIPPED | OUTPATIENT
Start: 2025-04-03

## 2025-04-03 RX ORDER — SPIRONOLACTONE 100 MG/1
100 TABLET, FILM COATED ORAL 2 TIMES DAILY
Qty: 180 TABLET | Refills: 1 | Status: SHIPPED | OUTPATIENT
Start: 2025-04-03

## 2025-04-22 DIAGNOSIS — E13.9 LADA (LATENT AUTOIMMUNE DIABETES IN ADULTS), MANAGED AS TYPE 1 (HCC): ICD-10-CM

## 2025-04-22 DIAGNOSIS — E10.65 TYPE 1 DIABETES MELLITUS WITH HYPERGLYCEMIA (HCC): ICD-10-CM

## 2025-04-22 RX ORDER — INSULIN LISPRO-AABC 100 [IU]/ML
INJECTION, SOLUTION INTRAVENOUS; SUBCUTANEOUS
Qty: 90 ML | Refills: 3 | Status: SHIPPED | OUTPATIENT
Start: 2025-04-22

## 2025-04-28 ENCOUNTER — OFFICE VISIT (OUTPATIENT)
Dept: ENDOCRINOLOGY | Facility: CLINIC | Age: 45
End: 2025-04-28
Payer: COMMERCIAL

## 2025-04-28 VITALS
OXYGEN SATURATION: 99 % | WEIGHT: 190.4 LBS | HEART RATE: 88 BPM | DIASTOLIC BLOOD PRESSURE: 72 MMHG | HEIGHT: 66 IN | SYSTOLIC BLOOD PRESSURE: 123 MMHG | BODY MASS INDEX: 30.6 KG/M2

## 2025-04-28 DIAGNOSIS — Z96.41 INSULIN PUMP IN PLACE: ICD-10-CM

## 2025-04-28 DIAGNOSIS — E55.9 VITAMIN D DEFICIENCY: ICD-10-CM

## 2025-04-28 DIAGNOSIS — E10.65 TYPE 1 DIABETES MELLITUS WITH HYPERGLYCEMIA (HCC): Primary | ICD-10-CM

## 2025-04-28 DIAGNOSIS — E13.9 LADA (LATENT AUTOIMMUNE DIABETES IN ADULTS), MANAGED AS TYPE 1 (HCC): ICD-10-CM

## 2025-04-28 DIAGNOSIS — Z79.899 TRANSGENDER WOMAN ON HORMONE THERAPY: ICD-10-CM

## 2025-04-28 DIAGNOSIS — F64.0 TRANSGENDER WOMAN ON HORMONE THERAPY: ICD-10-CM

## 2025-04-28 LAB — SL AMB POCT HEMOGLOBIN AIC: 7.4 (ref ?–6.5)

## 2025-04-28 PROCEDURE — 95251 CONT GLUC MNTR ANALYSIS I&R: CPT | Performed by: INTERNAL MEDICINE

## 2025-04-28 PROCEDURE — 99214 OFFICE O/P EST MOD 30 MIN: CPT | Performed by: INTERNAL MEDICINE

## 2025-04-28 PROCEDURE — 83036 HEMOGLOBIN GLYCOSYLATED A1C: CPT | Performed by: INTERNAL MEDICINE

## 2025-04-28 RX ORDER — BLOOD-GLUCOSE METER
EACH MISCELLANEOUS 2 TIMES DAILY
Qty: 1 KIT | Refills: 2 | Status: SHIPPED | OUTPATIENT
Start: 2025-04-28

## 2025-04-28 RX ORDER — BLOOD SUGAR DIAGNOSTIC
STRIP MISCELLANEOUS
Qty: 100 EACH | Refills: 3 | Status: SHIPPED | OUTPATIENT
Start: 2025-04-28

## 2025-04-28 RX ORDER — LANCETS
EACH MISCELLANEOUS
Qty: 100 EACH | Refills: 2 | Status: SHIPPED | OUTPATIENT
Start: 2025-04-28

## 2025-04-28 NOTE — PATIENT INSTRUCTIONS
Continue current pump settings  Please document food eaten/consumed in the evening.  Please try to take food bolus before eating rather than after  Avoid very high carbohydrate containing snacks late in the evening  Plan for review of food log as well as pump download in 2 weeks  Labs to be done as ordered   Follow up in 3-4 [id number]

## 2025-04-28 NOTE — PROGRESS NOTES
Jody Pascal 44 y.o. adult MRN: [id number]    Encounter: [id number]  Assessment & Plan  1. Type 1 Diabetes Mellitus long-term insulin therapy with hypo-/hyperglycemia  2. Insulin pump therapy  3. Diabetic retinopathy  Poorly controlled with A1c 7.4, however this is a slight improvement compared to before.  Fewer hypoglycemic episodes; hyperglycemia is most prominent post dinner/late evening snack.  There are days that blood sugars have been very well-controlled, and range throughout the day.    Recommend the following for now   -Continue current pump settings  - Document evening food intake, administer food bolus prior to eating.  - Avoid high-carb snacks in evening. Prescribed new glucometer. Review food log and pump download in 2-3 weeks.  Depending on review, will decide on changes that may need to be made including different carb insulin ratio for evening meals/snacks, extended bolus etc.    3. Gender-Affirming Hormone Therapy.  - On estradiol 2 mg twice daily, spironolactone twice daily.  - Feels great with current treatment, no significant issues.  - Canceled second vocal cord feminization surgery, satisfied with current results.  - Monitor hormone levels with upcoming lab tests, including estradiol and prolactin.    Follow-up in 3-4 months.    CC:  type 1 diabetes mellitus     History of Present Illness  44-year-old female here for follow-up of type 1 diabetes mellitus and gender-affirming hormone therapy. Last seen in 01/2025. Using OmniPod 5 with Dexcom for diabetes management.    Engages in physical activity several times a week, including running. Occasional dietary indulgences, overall improved. A1c 7.4. Less frequent hyperglycemic and hypoglycemic episodes. No changes in vision, numbness, or tingling. Sees retina doctor every 6 months, last visit 12/2024. One eye improving, other stable with slight improvement.  Has not noticed any vision changes  No chest pain, shortness of breath, diarrhea, or  constipation.   Plans to complete lab work this week.   Glucose monitor failure during recent weekend work trip, period without monitoring of BG. Glucometer - many years old, would like a new one.   Diet: two meals, snack, primarily water with lemon juice.     Gender affirming hormone therapy  Feeling well with current regimen: estradiol 2 mg twice daily, spironolactone 100 mg twice daily.   Second vocal cord feminization surgery canceled due to recovery concerns, satisfied with current vocal range, no further surgery planned for now    Takes over-the-counter vitamin D supplements, two pills daily.    PAST SURGICAL HISTORY: Vocal cord feminization surgery.              All other systems were reviewed and are negative.       Review of Systems    Historical Information   Past Medical History:   Diagnosis Date    Allergic     Diabetes mellitus (HCC) 10/25/2019    Former smoker 2019     Past Surgical History:   Procedure Laterality Date    IN LARGSC W/NJX VOCAL CORD THER W/MICRO/TELESCOPE Bilateral 2024    Procedure: micro direct laryngoscopy, glotto plasty, vocal fold injection;  Surgeon: Graham Wiggins MD;  Location: [facility];  Service: ENT    VOCAL CORD INJECTION N/A 2024    Procedure: MICROLARYNGOSCOPY FOR VOCAL CORD STRIPPING WITH INJECTION;  Surgeon: Graham Wiggins MD;  Location: [facility];  Service: ENT     Social History   Social History     Substance and Sexual Activity   Alcohol Use Yes    Comment: social drinker     Social History     Substance and Sexual Activity   Drug Use No     Social History     Tobacco Use   Smoking Status Former    Current packs/day: 0.00    Average packs/day: 0.5 packs/day for 20.2 years (10.1 ttl pk-yrs)    Types: Cigarettes    Start date: 1998    Quit date: 11/15/2018    Years since quittin.4   Smokeless Tobacco Never     Family History:   Family History   Problem Relation Age of Onset    Hypertension Mother     Diabetes Mother     Diabetes Father      Dementia Father     Diabetes type II Father     Substance Abuse Brother        Meds/Allergies   Current Outpatient Medications   Medication Sig Dispense Refill    BD Pen Needle Kaykay 2nd Gen 32G X 4 MM MISC USE TO INJECT INSULIN 4 TIMES A  each 0    Continuous Glucose Sensor (Dexcom G7 Sensor) Use 1 Device every 10 days 9 each 3    CVS Glucose 4-6 GM-MG CHEW 4 TABLETS (16 G TOTAL) AS NEEDED FOR LOW BLOOD SUGAR      estradiol (ESTRACE) 2 MG tablet TAKE 1 TABLET BY MOUTH TWICE A  tablet 1    famotidine (PEPCID) 40 MG tablet TAKE 1 TABLET BY MOUTH DAILY AT BEDTIME 90 tablet 4    fluticasone (FLONASE) 50 mcg/act nasal spray SPRAY 2 SPRAYS INTO EACH NOSTRIL EVERY DAY 48 mL 4    glucagon (GLUCAGON EMERGENCY) 1 MG injection Inject 1 mg under the skin once as needed for low blood sugar for up to 1 dose 1 kit 0    glucose 4 g chewable tablet Chew 4 tablets (16 g total) as needed for low blood sugar 100 tablet 3    InPen 100-Blue-Milagros ANNABEL USE INPEN DEVICE TO ADMINISTER HUMALOG INSULIN 1 each 0    Insulin Disposable Pump (Omnipod 5 G7 Pods, Gen 5,) MISC USE ONE POD EVERY 3 DAYS 30 each 3    insulin lispro (HumaLOG KwikPen) 100 units/mL injection pen Inject 4-10 Units under the skin 3 (three) times a day with meals 15 mL 3    ketoconazole (NIZORAL) 2 % cream Apply topically daily 60 g 3    Lyumjev 100 UNIT/ML SOLN INJECT 300 UNITS IN PUMP EVERY 3 DAYS 90 mL 3    Needles & Syringes MISC Use 3 (three) times a day If needed separately from formulary insulin pens 1 each 4    spironolactone (ALDACTONE) 100 mg tablet TAKE 1 TABLET BY MOUTH TWICE A  tablet 1    Tresiba FlexTouch 100 units/mL injection pen INJECT 30 UNITS UNDER THE SKIN EVERY MORNING 30 mL 3    vardenafil (LEVITRA) 20 MG tablet Take 1 tablet (20 mg total) by mouth daily as needed for erectile dysfunction 10 tablet 0     No current facility-administered medications for this visit.     Allergies   Allergen Reactions    Other Allergic Rhinitis      "SEASONAL ALLERGIES AND PET DANDER       Objective   Vitals: Blood pressure 123/72, pulse 88, height 5' 6\" (1.676 m), weight 86.4 kg (190 lb 6.4 oz), SpO2 99%.    Physical Exam  Constitutional:       General: She is not in acute distress.     Appearance: She is well-developed. She is not diaphoretic.   HENT:      Head: Normocephalic and atraumatic.   Eyes:      Conjunctiva/sclera: Conjunctivae normal.   Cardiovascular:      Rate and Rhythm: Normal rate and regular rhythm.      Heart sounds: Normal heart sounds. No murmur heard.  Pulmonary:      Effort: Pulmonary effort is normal. No respiratory distress.      Breath sounds: Normal breath sounds. No wheezing.   Abdominal:      General: There is no distension.      Palpations: Abdomen is soft.      Tenderness: There is no abdominal tenderness. There is no guarding.   Musculoskeletal:      Cervical back: Normal range of motion and neck supple.   Lymphadenopathy:      Cervical: No cervical adenopathy.   Skin:     General: Skin is warm and dry.      Findings: No erythema or rash.   Neurological:      Mental Status: She is alert and oriented to person, place, and time.   Psychiatric:         Behavior: Behavior normal.         Thought Content: Thought content normal.         The history was obtained from the review of the chart, patient.    Lab Results:      Lab Results   Component Value Date    WBC 6.28 07/01/2024    HGB 13.0 07/01/2024    HCT 39.6 07/01/2024    MCV 93 07/01/2024     07/01/2024     Lab Results   Component Value Date    CREATININE 0.85 01/15/2025    BUN 12 01/15/2025    K 4.6 01/15/2025     01/15/2025    CO2 27 01/15/2025     Lab Results   Component Value Date    HGBA1C 7.4 (A) 04/28/2025         Imaging Studies:       Results Review Statement: No pertinent imaging studies reviewed.    Portions of the record may have been created with voice recognition software. Occasional wrong word or \"sound a like\" substitutions may have occurred due to the " inherent limitations of voice recognition software. Read the chart carefully and recognize, using context, where substitutions have occurred.

## 2025-04-30 ENCOUNTER — APPOINTMENT (OUTPATIENT)
Dept: LAB | Facility: CLINIC | Age: 45
End: 2025-04-30
Payer: COMMERCIAL

## 2025-04-30 DIAGNOSIS — Z96.41 INSULIN PUMP IN PLACE: ICD-10-CM

## 2025-04-30 DIAGNOSIS — E13.9 LADA (LATENT AUTOIMMUNE DIABETES IN ADULTS), MANAGED AS TYPE 1 (HCC): ICD-10-CM

## 2025-04-30 DIAGNOSIS — E10.65 TYPE 1 DIABETES MELLITUS WITH HYPERGLYCEMIA (HCC): ICD-10-CM

## 2025-04-30 DIAGNOSIS — Z79.899 TRANSGENDER WOMAN ON HORMONE THERAPY: ICD-10-CM

## 2025-04-30 DIAGNOSIS — E55.9 VITAMIN D DEFICIENCY: ICD-10-CM

## 2025-04-30 DIAGNOSIS — E10.3293 MILD NONPROLIFERATIVE DIABETIC RETINOPATHY OF BOTH EYES WITHOUT MACULAR EDEMA ASSOCIATED WITH TYPE 1 DIABETES MELLITUS (HCC): ICD-10-CM

## 2025-04-30 DIAGNOSIS — F64.0 TRANSGENDER WOMAN ON HORMONE THERAPY: ICD-10-CM

## 2025-04-30 LAB
25(OH)D3 SERPL-MCNC: 63 NG/ML (ref 30–100)
ALBUMIN SERPL BCG-MCNC: 3.8 G/DL (ref 3.5–5)
ALP SERPL-CCNC: 44 U/L (ref 34–104)
ALT SERPL W P-5'-P-CCNC: 11 U/L (ref 7–52)
ANION GAP SERPL CALCULATED.3IONS-SCNC: 9 MMOL/L (ref 4–13)
AST SERPL W P-5'-P-CCNC: 15 U/L (ref 5–45)
BASOPHILS # BLD AUTO: 0.04 THOUSANDS/ÂΜL (ref 0–0.1)
BASOPHILS NFR BLD AUTO: 1 % (ref 0–1)
BILIRUB SERPL-MCNC: 0.51 MG/DL (ref 0.2–1)
BUN SERPL-MCNC: 14 MG/DL (ref 5–25)
CALCIUM SERPL-MCNC: 9 MG/DL (ref 8.4–10.2)
CHLORIDE SERPL-SCNC: 102 MMOL/L (ref 96–108)
CHOLEST SERPL-MCNC: 175 MG/DL (ref ?–200)
CO2 SERPL-SCNC: 25 MMOL/L (ref 21–32)
CREAT SERPL-MCNC: 0.81 MG/DL (ref 0.6–1.3)
EOSINOPHIL # BLD AUTO: 0.08 THOUSAND/ÂΜL (ref 0–0.61)
EOSINOPHIL NFR BLD AUTO: 1 % (ref 0–6)
ERYTHROCYTE [DISTWIDTH] IN BLOOD BY AUTOMATED COUNT: 12.2 % (ref 11.6–15.1)
ESTRADIOL SERPL-MCNC: 133.2 PG/ML
GLUCOSE P FAST SERPL-MCNC: 199 MG/DL (ref 65–99)
HCT VFR BLD AUTO: 40.3 % (ref 36.5–46.1)
HDLC SERPL-MCNC: 57 MG/DL
HGB BLD-MCNC: 13.1 G/DL (ref 12–15.4)
IMM GRANULOCYTES # BLD AUTO: 0.04 THOUSAND/UL (ref 0–0.2)
IMM GRANULOCYTES NFR BLD AUTO: 1 % (ref 0–2)
LDLC SERPL CALC-MCNC: 89 MG/DL (ref 0–100)
LYMPHOCYTES # BLD AUTO: 1.51 THOUSANDS/ÂΜL (ref 0.6–4.47)
LYMPHOCYTES NFR BLD AUTO: 19 % (ref 14–44)
MCH RBC QN AUTO: 30.3 PG (ref 26.8–34.3)
MCHC RBC AUTO-ENTMCNC: 32.5 G/DL (ref 31.4–37.4)
MCV RBC AUTO: 93 FL (ref 82–98)
MONOCYTES # BLD AUTO: 0.64 THOUSAND/ÂΜL (ref 0.17–1.22)
MONOCYTES NFR BLD AUTO: 8 % (ref 4–12)
NEUTROPHILS # BLD AUTO: 5.69 THOUSANDS/ÂΜL (ref 1.85–7.62)
NEUTS SEG NFR BLD AUTO: 70 % (ref 43–75)
NONHDLC SERPL-MCNC: 118 MG/DL
NRBC BLD AUTO-RTO: 0 /100 WBCS
PLATELET # BLD AUTO: 330 THOUSANDS/UL (ref 149–390)
PMV BLD AUTO: 11.3 FL (ref 8.9–12.7)
POTASSIUM SERPL-SCNC: 4.4 MMOL/L (ref 3.5–5.3)
PROLACTIN SERPL-MCNC: 13.44 NG/ML (ref 3.34–13.13)
PROT SERPL-MCNC: 6.8 G/DL (ref 6.4–8.4)
RBC # BLD AUTO: 4.33 MILLION/UL (ref 3.88–5.12)
SODIUM SERPL-SCNC: 136 MMOL/L (ref 135–147)
TRIGL SERPL-MCNC: 145 MG/DL (ref ?–150)
WBC # BLD AUTO: 8 THOUSAND/UL (ref 4.31–10.16)

## 2025-04-30 PROCEDURE — 82670 ASSAY OF TOTAL ESTRADIOL: CPT

## 2025-04-30 PROCEDURE — 80061 LIPID PANEL: CPT

## 2025-04-30 PROCEDURE — 84146 ASSAY OF PROLACTIN: CPT

## 2025-04-30 PROCEDURE — 36415 COLL VENOUS BLD VENIPUNCTURE: CPT

## 2025-04-30 PROCEDURE — 82306 VITAMIN D 25 HYDROXY: CPT

## 2025-05-05 ENCOUNTER — RESULTS FOLLOW-UP (OUTPATIENT)
Dept: ENDOCRINOLOGY | Facility: CLINIC | Age: 45
End: 2025-05-05

## 2025-05-06 ENCOUNTER — PROCEDURE VISIT (OUTPATIENT)
Dept: DERMATOLOGY | Facility: CLINIC | Age: 45
End: 2025-05-06

## 2025-05-06 VITALS — BODY MASS INDEX: 30.53 KG/M2 | HEIGHT: 66 IN | WEIGHT: 190 LBS | TEMPERATURE: 98.3 F

## 2025-05-06 DIAGNOSIS — F64.9 GENDER DYSPHORIA: Primary | ICD-10-CM

## 2025-05-06 NOTE — PROGRESS NOTES
"[facility] Dermatology Clinic Note     Patient Name: Jody Pascal  Encounter Date: 05/06/2025       Have you been cared for by a [facility] Dermatologist in the last 3 years and, if so, which description applies to you? Yes. I have been here within the last 3 years, and my medical history has NOT changed since that time.    REVIEW OF SYSTEMS:  Have you recently had or currently have any of the following? No changes in my recent health.   PAST MEDICAL HISTORY:  Have you personally ever had or currently have any of the following?  If \"YES,\" then please provide more detail. No changes in my medical history.   HISTORY OF IMMUNOSUPPRESSION: Do you have a history of any of the following:  Systemic Immunosuppression such as Diabetes, Biologic or Immunotherapy, Chemotherapy, Organ Transplantation, Bone Marrow Transplantation or Prednisone?  YES, patient is diabetic     Answering \"YES\" requires the addition of the dotphrase \"IMMUNOSUPPRESSED\" as the first diagnosis of the patient's visit.   FAMILY HISTORY:  Any \"first degree relatives\" (parent, brother, sister, or child) with the following?    No changes in my family's known health.   PATIENT EXPERIENCE:    Do you want the Dermatologist to perform a COMPLETE skin exam today including a clinical examination under the \"bra and underwear\" areas?  NO  If necessary, do we have your permission to call and leave a detailed message on your Preferred Phone number that includes your specific medical information?  Yes      Allergies   Allergen Reactions    Other Allergic Rhinitis     SEASONAL ALLERGIES AND PET DANDER      Current Outpatient Medications:     BD Pen Needle Kaykay 2nd Gen 32G X 4 MM MISC, USE TO INJECT INSULIN 4 TIMES A DAY, Disp: 400 each, Rfl: 0    Blood Glucose Monitoring Suppl (OneTouch Verio) w/Device KIT, Use 2 (two) times a day, Disp: 1 kit, Rfl: 2    Continuous Glucose Sensor (Dexcom G7 Sensor), Use 1 Device every 10 days, Disp: 9 each, Rfl: 3    CVS Glucose 4-6 " GM-MG, CHEW 4 TABLETS (16 G TOTAL) AS NEEDED FOR LOW BLOOD SUGAR, Disp: , Rfl:     estradiol (ESTRACE) 2 MG tablet, TAKE 1 TABLET BY MOUTH TWICE A DAY, Disp: 180 tablet, Rfl: 1    famotidine (PEPCID) 40 MG tablet, TAKE 1 TABLET BY MOUTH DAILY AT BEDTIME, Disp: 90 tablet, Rfl: 4    fluticasone (FLONASE) 50 mcg/act nasal spray, SPRAY 2 SPRAYS INTO EACH NOSTRIL EVERY DAY, Disp: 48 mL, Rfl: 4    glucagon (GLUCAGON EMERGENCY) 1 MG injection, Inject 1 mg under the skin once as needed for low blood sugar for up to 1 dose, Disp: 1 kit, Rfl: 0    glucose 4 g chewable tablet, Chew 4 tablets (16 g total) as needed for low blood sugar, Disp: 100 tablet, Rfl: 3    glucose blood (OneTouch Verio) test strip, Use as instructed twice a day, Disp: 100 each, Rfl: 3    InPen 066-Rrpe-Lgvvj ANNABEL, USE INPEN DEVICE TO ADMINISTER HUMALOG INSULIN, Disp: 1 each, Rfl: 0    Insulin Disposable Pump (Omnipod 5 G7 Pods, Gen 5,) MISC, USE ONE POD EVERY 3 DAYS, Disp: 30 each, Rfl: 3    insulin lispro (HumaLOG KwikPen) 100 units/mL injection pen, Inject 4-10 Units under the skin 3 (three) times a day with meals, Disp: 15 mL, Rfl: 3    ketoconazole (NIZORAL) 2 % cream, Apply topically daily, Disp: 60 g, Rfl: 3    Lancets (onetouch ultrasoft) lancets, Use as instructed twice a day, Disp: 100 each, Rfl: 2    Lyumjev 100 UNIT/ML SOLN, INJECT 300 UNITS IN PUMP EVERY 3 DAYS, Disp: 90 mL, Rfl: 3    Needles & Syringes MISC, Use 3 (three) times a day If needed separately from formulary insulin pens, Disp: 1 each, Rfl: 4    spironolactone (ALDACTONE) 100 mg tablet, TAKE 1 TABLET BY MOUTH TWICE A DAY, Disp: 180 tablet, Rfl: 1    Tresiba FlexTouch 100 units/mL injection pen, INJECT 30 UNITS UNDER THE SKIN EVERY MORNING, Disp: 30 mL, Rfl: 3    vardenafil (LEVITRA) 20 MG tablet, Take 1 tablet (20 mg total) by mouth daily as needed for erectile dysfunction, Disp: 10 tablet, Rfl: 0          Whom besides the patient is providing clinical information about today's  encounter?   NO ADDITIONAL HISTORIAN (patient alone provided history)    Physical Exam and Assessment/Plan by Diagnosis:      [facility] Laser Treatment  5/6/2025    Device: Kalyn Gentle Max Pro  Place of Treatment: [facility]    Surgeon and :  Dr Michael  Assistant: ERICKA Juarez    Treatment number: 9  Site of treatment: face and neck  Skin type: Lambert II    Pre-operative Diagnosis: gender dysphoria  Indications for Surgery:    Post-operative Diagnosis: same as pre-operative    Anesthetic: Victoriano cooler     Safety Precautions:  Fire extinguisher persent/Window covered/Staff and patient eyes covered/Warning sign posted     Interim History/Comments:  no adverse effects from previous procedures   Percent Improvement: N/A    Parameters: Laser was set to 755nm    Fluence: 30.0 J/cm2  Pulse duration: 3 msec  Spot size: 12 mm  Pulse count: 292     Procedure Note:   After discussing potential procedure related risks including but not limited to pain, purpura, blistering, scarring, discoloration, “footprinting,” recurrence, inefficacy, need for additional treatment, and undesirable cosmetic results, written and verbal consent were obtained.  Patient was brought back to the operating room. Time out was performed.  Patient's name, identification and intended procedure were verified. Prior to the procedure, the patient cleansed the treatment area. The treatment area was re-cleansed with alcohol. Eye coverings eye shield inserted/placed.     The cooling device was set to 40 msec msec pre / 30 msec delay     Tolerance: Patient tolerated the procedure well with no immediate significant complications and left in stable condition.   Complications: none  Other procedures: none  Photography: no    Post-op Care: Aftercare was discussed. Continue to ice at home and keep the area moist with a gentle moisturizer. Advised the patient to avoid exercise for the next 24 hours and also to be cautious with sun  exposure over the next week. Advised patient to call the office if there is excessive swelling.   Follow-up:  Patient is aware that it will take multiple treatments to treat this condition. Return to clinic for re-treatment in 6 weeks.    BILL INSURANCE FOR CPT CODES 81040, 53732.     Scribe Attestation      I,:  Melina Carson MA am acting as a scribe while in the presence of the attending physician.:       I,:  Soren Michael MD personally performed the services described in this documentation    as scribed in my presence.:

## 2025-05-06 NOTE — PATIENT INSTRUCTIONS
LASER HAIR REDUCTION PATIENT INSTRUCTIONS    PRE-TREATMENT INSTRUCTIONS    Avoid the sun 4-6 weeks before and after treatment or as otherwise directed by your healthcare provider.   Your provider may ask you to stop any topical medications or skin care products 3-5 days prior to treatment  You MUST avoid bleaching, plucking or waxing hair for 4-6 weeks prior to treatment. If you have had a history of perioral or genital herpes simplex virus, your provider may recommend  prophylactic antiviral therapy. Follow the directions for your particular antiviral medication.  If you have a tan or have a darker skin type, a bleaching regimen can be started 4-6 weeks before treatment.  RECENTLY TANNED SKIN CANNOT BE TREATED! If treated within two weeks of active (natural sunlight or tanning becker) tanning, you may develop white spots after treatment and this may not clear for 2-3 months or longer.  The use of self- tanning skin products must be discontinued one week before treatment. Any residual self- berny should be removed prior to treatment.    INTRA-TREATMENT CARE    The skin is cleaned and shaved prior to treatment. The use of a topical anesthetic is optional.  When treating the upper lip, the teeth can be protected with moist white gauze. The gauze also serves to support the lip during treatment, allowing a surface to push against.  The DCD (cryogen cooling device), will be used with the laser to cool the skin during treatment.  Safety considerations are important during the laser procedure. Laser safety protective eye wear MUST be worn by the patient and all personnel in the treatment room during the procedure to reduce the chance of damage to the eye. In addition, your provider will take all necessary precautions to ensure your safety.    POST-TREATMENT CARE    Immediately after treatment, there should be redness and edema swelling of each hair follicle in the treatment site, which may last up to two hours, or longer.  The erythema may last up to 2-3 days. Th e treated area will feel like a sunburn for a few hours after treatment. You can use topical hydrocortisone or Benadryl to alleviate the discomfort. An oral antihistamine can also be used. Non-drowsy antihistamine (such as Claritin) can be taken during the day to avoid the drowsiness associated with Benadryl.  Your provider may use an optional  ling method after treatment to ensure your comfort.  A topical soothing skin care product, such as aloe vera gel, can be applied following treatment if desired.  Makeup can be used immediately after the treatment, as long as the skin is not irritated.  Avoid sun exposure to reduce the chance of hyperpigmentation (darker pigmentation).  Use a sunblock (SPF 30+) at all times throughout the course of treatment.  Avoid picking or scratching the treated skin. After the laser treatment is performed, do not use any other hair removal treatment products or similar treatments (waxing, electrolysis or tweezing) that will disturb the hair follicle in the treatment area for 4-6 weeks. Shaving is the preferred method.  Anywhere from 10-21 days after the treatment, shedding of the treated hair may occur and this appears as new hair growth. This is NOT new hair growth. You can clean and remove the hair by washing or wiping the area with a wet cloth or Loofa sponge.  After the underarms are treated, you may wish to use a powder instead of a deodorant for 24 hours after the treatment to reduce skin irritation.  There are no restrictions on bathing except to treat the skin gently for the first 24 hours, as if you had a sunburn.  Return to the office or call for an appointment at the first sign of the return of hair growth. This may be within 4-6 weeks for the upper body and possibly as long as 2-3 months for the lower body. Hair re-growth occurs at different rates on different areas of the body. New hair growth will not occur for AT LEAST three weeks  after treatment.  Call us at [phone] with any questions or concerns you may have.

## 2025-05-14 ENCOUNTER — PATIENT MESSAGE (OUTPATIENT)
Dept: ENDOCRINOLOGY | Facility: CLINIC | Age: 45
End: 2025-05-14

## 2025-06-03 ENCOUNTER — PROCEDURE VISIT (OUTPATIENT)
Dept: DERMATOLOGY | Facility: CLINIC | Age: 45
End: 2025-06-03

## 2025-06-03 VITALS — BODY MASS INDEX: 30.67 KG/M2 | TEMPERATURE: 97.7 F | WEIGHT: 190 LBS

## 2025-06-03 DIAGNOSIS — F64.9 GENDER DYSPHORIA: Primary | ICD-10-CM

## 2025-06-03 NOTE — PROGRESS NOTES
"[facility] Dermatology Clinic Note     Patient Name: Jody Pascal  Encounter Date: 6/3/2025       Have you been cared for by a [facility] Dermatologist in the last 3 years and, if so, which description applies to you? Yes. I have been here within the last 3 years, and my medical history has NOT changed since that time. I am not of child-bearing potential.     REVIEW OF SYSTEMS:  Have you recently had or currently have any of the following? No changes in my recent health.   PAST MEDICAL HISTORY:  Have you personally ever had or currently have any of the following?  If \"YES,\" then please provide more detail. No changes in my medical history.   HISTORY OF IMMUNOSUPPRESSION: Do you have a history of any of the following:  Systemic Immunosuppression such as Diabetes, Biologic or Immunotherapy, Chemotherapy, Organ Transplantation, Bone Marrow Transplantation or Prednisone?  No     Answering \"YES\" requires the addition of the dotphrase \"IMMUNOSUPPRESSED\" as the first diagnosis of the patient's visit.   FAMILY HISTORY:  Any \"first degree relatives\" (parent, brother, sister, or child) with the following?    No changes in my family's known health.   PATIENT EXPERIENCE:    Do you want the Dermatologist to perform a COMPLETE skin exam today including a clinical examination under the \"bra and underwear\" areas?  NO  If necessary, do we have your permission to call and leave a detailed message on your Preferred Phone number that includes your specific medical information?  Yes      Allergies[1] Current Medications[2]              Whom besides the patient is providing clinical information about today's encounter?   NO ADDITIONAL HISTORIAN (patient alone provided history)    Physical Exam and Assessment/Plan by Diagnosis:  [facility] Laser Treatment  6/3/2025     Device: Kalyn Gentle Max Pro  Place of Treatment: [facility]     Surgeon and : Dr Michael     Treatment number: 10  Site of treatment: face and " neck  Skin type: Lambert II     Pre-operative Diagnosis: gender dysphoria  Indications for Surgery:    Post-operative Diagnosis: same as pre-operative     Anesthetic: Victoriano cooler     Safety Precautions:  Fire extinguisher persent/Window covered/Staff and patient eyes covered/Warning sign posted     Interim History/Comments:  no adverse effects from previous procedures   Percent Improvement: N/A     Parameters: Laser was set to 755nm     Fluence: 18 J/cm2  Pulse duration: 3 msec  Spot size: 18 mm  Pulse count: 292      Procedure Note:   After discussing potential procedure related risks including but not limited to pain, purpura, blistering, scarring, discoloration, “footprinting,” recurrence, inefficacy, need for additional treatment, and undesirable cosmetic results, written and verbal consent were obtained.  Patient was brought back to the operating room. Time out was performed.  Patient's name, identification and intended procedure were verified. Prior to the procedure, the patient cleansed the treatment area. The treatment area was re-cleansed with alcohol. Eye coverings eye shield inserted/placed.      The cooling device was set to 40 msec msec pre / 30 msec delay     Tolerance: Patient tolerated the procedure well with no immediate significant complications and left in stable condition.   Complications: none  Other procedures: none  Photography: no     Post-op Care: Aftercare was discussed. Continue to ice at home and keep the area moist with a gentle moisturizer. Advised the patient to avoid exercise for the next 24 hours and also to be cautious with sun exposure over the next week. Advised patient to call the office if there is excessive swelling.   Follow-up:  Patient is aware that it will take multiple treatments to treat this condition. Return to clinic for re-treatment in 6 weeks.     BILL INSURANCE FOR CPT CODES 49563, 59299.       Scribe Attestation      I,:  Madisyn Vernon am acting as a scribe  while in the presence of the attending physician.:       I,:  Soren Michael MD personally performed the services described in this documentation    as scribed in my presence.:                   [1]   Allergies  Allergen Reactions    Other Allergic Rhinitis     SEASONAL ALLERGIES AND PET DANDER   [2]   Current Outpatient Medications:     BD Pen Needle Kaykay 2nd Gen 32G X 4 MM MISC, USE TO INJECT INSULIN 4 TIMES A DAY, Disp: 400 each, Rfl: 0    Blood Glucose Monitoring Suppl (OneTouch Verio) w/Device KIT, Use 2 (two) times a day, Disp: 1 kit, Rfl: 2    Continuous Glucose Sensor (Dexcom G7 Sensor), Use 1 Device every 10 days, Disp: 9 each, Rfl: 3    [facility] Glucose 4-6 GM-MG, CHEW 4 TABLETS (16 G TOTAL) AS NEEDED FOR LOW BLOOD SUGAR, Disp: , Rfl:     estradiol (ESTRACE) 2 MG tablet, TAKE 1 TABLET BY MOUTH TWICE A DAY, Disp: 180 tablet, Rfl: 1    famotidine (PEPCID) 40 MG tablet, TAKE 1 TABLET BY MOUTH DAILY AT BEDTIME, Disp: 90 tablet, Rfl: 4    fluticasone (FLONASE) 50 mcg/act nasal spray, SPRAY 2 SPRAYS INTO EACH NOSTRIL EVERY DAY, Disp: 48 mL, Rfl: 4    glucagon (GLUCAGON EMERGENCY) 1 MG injection, Inject 1 mg under the skin once as needed for low blood sugar for up to 1 dose, Disp: 1 kit, Rfl: 0    glucose 4 g chewable tablet, Chew 4 tablets (16 g total) as needed for low blood sugar, Disp: 100 tablet, Rfl: 3    glucose blood (OneTouch Verio) test strip, Use as instructed twice a day, Disp: 100 each, Rfl: 3    InPen 098-[id number] ANNABEL, USE INPEN DEVICE TO ADMINISTER HUMALOG INSULIN, Disp: 1 each, Rfl: 0    Insulin Disposable Pump (Omnipod 5 G7 Pods, Gen 5,) MISC, USE ONE POD EVERY 3 DAYS, Disp: 30 each, Rfl: 3    insulin lispro (HumaLOG KwikPen) 100 units/mL injection pen, Inject 4-10 Units under the skin 3 (three) times a day with meals, Disp: 15 mL, Rfl: 3    ketoconazole (NIZORAL) 2 % cream, Apply topically daily, Disp: 60 g, Rfl: 3    Lancets (onetouch ultrasoft) lancets, Use as instructed twice a day, Disp: 100  each, Rfl: 2    Lyumjev 100 UNIT/ML SOLN, INJECT 300 UNITS IN PUMP EVERY 3 DAYS, Disp: 90 mL, Rfl: 3    Needles & Syringes MISC, Use 3 (three) times a day If needed separately from formulary insulin pens, Disp: 1 each, Rfl: 4    spironolactone (ALDACTONE) 100 mg tablet, TAKE 1 TABLET BY MOUTH TWICE A DAY, Disp: 180 tablet, Rfl: 1    Tresiba FlexTouch 100 units/mL injection pen, INJECT 30 UNITS UNDER THE SKIN EVERY MORNING, Disp: 30 mL, Rfl: 3    vardenafil (LEVITRA) 20 MG tablet, Take 1 tablet (20 mg total) by mouth daily as needed for erectile dysfunction, Disp: 10 tablet, Rfl: 0

## 2025-06-03 NOTE — PATIENT INSTRUCTIONS
Post-op Care: Aftercare was discussed. Continue to ice at home and keep the area moist with a gentle moisturizer. Advised the patient to avoid exercise for the next 24 hours and also to be cautious with sun exposure over the next week. Advised patient to call the office if there is excessive swelling.   Follow-up:  Patient is aware that it will take multiple treatments to treat this condition. Return to clinic for re-treatment in 6 weeks.

## 2025-06-27 DIAGNOSIS — E10.65 TYPE 1 DIABETES MELLITUS WITH HYPERGLYCEMIA (HCC): ICD-10-CM

## 2025-06-27 RX ORDER — ACYCLOVIR 400 MG/1
1 TABLET ORAL
Qty: 9 EACH | Refills: 1 | Status: SHIPPED | OUTPATIENT
Start: 2025-06-27

## 2025-06-27 NOTE — TELEPHONE ENCOUNTER
Reason for call:   [x] Refill   [] Prior Auth  [] Other:     Office:   [] PCP/Provider -   [x] Specialty/Provider -     Medication: Continuous Glucose Sensor (Dexcom G7 Sensor)     Dose/Frequency: Use 1 Device every 10 days     Quantity: 9    Pharmacy: [facility] [place] [place]     Local Pharmacy   Does the patient have enough for 3 days?   [] Yes   [x] No - Send as HP to POD

## 2025-07-15 ENCOUNTER — PROCEDURE VISIT (OUTPATIENT)
Dept: DERMATOLOGY | Facility: CLINIC | Age: 45
End: 2025-07-15

## 2025-07-15 VITALS — HEIGHT: 66 IN | TEMPERATURE: 98 F | WEIGHT: 190 LBS | BODY MASS INDEX: 30.53 KG/M2

## 2025-07-15 DIAGNOSIS — F64.9 GENDER DYSPHORIA: Primary | ICD-10-CM

## 2025-08-12 ENCOUNTER — PROCEDURE VISIT (OUTPATIENT)
Dept: DERMATOLOGY | Facility: CLINIC | Age: 45
End: 2025-08-12

## (undated) DEVICE — ANTI-FOG SOLUTION WITH FOAM PAD: Brand: DEVON

## (undated) DEVICE — PROLARYN GEL IS A WATER-BASED INJECTABLE GEL IMPLANT USED IN THE VOCAL FOLDS TO TREAT VOCAL FOLD INSUFFIENCY. PROLARYN GEL RESORBS WITHIN A PERIOD OF 3-6 MONTHS AND IS A TEMPORARY IMPLANT.: Brand: PROLARYN GEL INJECTABLE IMPLANT

## (undated) DEVICE — BETHLEHEM UNIVERSAL OUTPATIENT: Brand: CARDINAL HEALTH

## (undated) DEVICE — NEURO PATTIES 1/2 X 1 1/2

## (undated) DEVICE — SPECIMEN CONTAINER STERILE PEEL PACK

## (undated) DEVICE — CLAMP TOWEL TUBING DISPOSABLE

## (undated) DEVICE — INTEGRA® SATALOFF DISPOSABLE SHARP KNIFE: Brand: INTEGRA®

## (undated) DEVICE — TUBING SUCTION 5MM X 12 FT